# Patient Record
Sex: MALE | Race: WHITE | NOT HISPANIC OR LATINO | Employment: FULL TIME | URBAN - METROPOLITAN AREA
[De-identification: names, ages, dates, MRNs, and addresses within clinical notes are randomized per-mention and may not be internally consistent; named-entity substitution may affect disease eponyms.]

---

## 2017-03-23 DIAGNOSIS — Z76.0 ENCOUNTER FOR MEDICATION REFILL: ICD-10-CM

## 2017-03-23 RX ORDER — ATORVASTATIN CALCIUM 10 MG/1
TABLET, FILM COATED ORAL
Qty: 90 TABLET | Refills: 1 | Status: SHIPPED | OUTPATIENT
Start: 2017-03-23 | End: 2017-04-21

## 2017-03-23 NOTE — TELEPHONE ENCOUNTER
atrovasting last OV 9/23/16 last labs 2/15/16- has 4/21/17 cpx  Dot Chavez MA March 23, 2017 10:38 AM    Recent Labs   Lab Test  02/15/16   1557  12/19/14   0835   CHOL  218*  228*   HDL  38*  36*   LDL  139*  154*   TRIG  203*  190*

## 2017-03-27 DIAGNOSIS — Z76.0 ENCOUNTER FOR MEDICATION REFILL: ICD-10-CM

## 2017-04-21 ENCOUNTER — OFFICE VISIT (OUTPATIENT)
Dept: FAMILY MEDICINE | Facility: CLINIC | Age: 47
End: 2017-04-21

## 2017-04-21 VITALS
WEIGHT: 228 LBS | SYSTOLIC BLOOD PRESSURE: 110 MMHG | RESPIRATION RATE: 16 BRPM | HEART RATE: 67 BPM | OXYGEN SATURATION: 98 % | HEIGHT: 69 IN | DIASTOLIC BLOOD PRESSURE: 78 MMHG | BODY MASS INDEX: 33.77 KG/M2

## 2017-04-21 DIAGNOSIS — Z00.00 ROUTINE GENERAL MEDICAL EXAMINATION AT A HEALTH CARE FACILITY: ICD-10-CM

## 2017-04-21 DIAGNOSIS — K21.00 GASTROESOPHAGEAL REFLUX DISEASE WITH ESOPHAGITIS: Primary | ICD-10-CM

## 2017-04-21 DIAGNOSIS — J45.20 INTERMITTENT ASTHMA, UNCOMPLICATED: ICD-10-CM

## 2017-04-21 DIAGNOSIS — E66.09 NON MORBID OBESITY DUE TO EXCESS CALORIES: ICD-10-CM

## 2017-04-21 DIAGNOSIS — J30.1 SEASONAL ALLERGIC RHINITIS DUE TO POLLEN: ICD-10-CM

## 2017-04-21 DIAGNOSIS — G47.33 OSA (OBSTRUCTIVE SLEEP APNEA): ICD-10-CM

## 2017-04-21 DIAGNOSIS — E78.00 HYPERCHOLESTEREMIA: ICD-10-CM

## 2017-04-21 DIAGNOSIS — Z12.5 SCREENING FOR PROSTATE CANCER: ICD-10-CM

## 2017-04-21 LAB
% GRANULOCYTES: 76.4 % (ref 42.2–75.2)
HCT VFR BLD AUTO: 44.3 % (ref 39–51)
HEMOGLOBIN: 15 G/DL (ref 13.4–17.5)
LYMPHOCYTES NFR BLD AUTO: 17.7 % (ref 20.5–51.1)
MCH RBC QN AUTO: 29.7 PG (ref 27–31)
MCHC RBC AUTO-ENTMCNC: 33.8 G/DL (ref 33–37)
MCV RBC AUTO: 87.8 FL (ref 80–100)
MONOCYTES NFR BLD AUTO: 5.9 % (ref 1.7–9.3)
PLATELET # BLD AUTO: 214 K/UL (ref 140–450)
RBC # BLD AUTO: 5.05 X10/CMM (ref 4.2–5.9)
WBC # BLD AUTO: 6.9 X10/CMM (ref 3.8–11)

## 2017-04-21 PROCEDURE — 80053 COMPREHEN METABOLIC PANEL: CPT | Mod: 90 | Performed by: FAMILY MEDICINE

## 2017-04-21 PROCEDURE — 99213 OFFICE O/P EST LOW 20 MIN: CPT | Mod: 25 | Performed by: FAMILY MEDICINE

## 2017-04-21 PROCEDURE — 84153 ASSAY OF PSA TOTAL: CPT | Mod: 90 | Performed by: FAMILY MEDICINE

## 2017-04-21 PROCEDURE — 80061 LIPID PANEL: CPT | Mod: 90 | Performed by: FAMILY MEDICINE

## 2017-04-21 PROCEDURE — 85025 COMPLETE CBC W/AUTO DIFF WBC: CPT | Performed by: FAMILY MEDICINE

## 2017-04-21 PROCEDURE — 36415 COLL VENOUS BLD VENIPUNCTURE: CPT | Performed by: FAMILY MEDICINE

## 2017-04-21 PROCEDURE — 99396 PREV VISIT EST AGE 40-64: CPT | Performed by: FAMILY MEDICINE

## 2017-04-21 RX ORDER — FLUTICASONE PROPIONATE 50 MCG
SPRAY, SUSPENSION (ML) NASAL
Qty: 16 G | Refills: 3 | Status: SHIPPED | OUTPATIENT
Start: 2017-04-21 | End: 2017-08-26

## 2017-04-21 NOTE — PROGRESS NOTES
Problem(s) Oriented visit    Besides the Wellness Exam he is here to discuss the status of the following problem(s)  Subjective:  1. Gastroesophageal reflux disease with esophagitis  GERD stable.  Taking meds as ordered, no reported side effects from medicines.  Eating properly to avoid sx.   No melena, no hematochezia, no diarrhea.  No unintended weigth loss.    - CBC with Diff/Plt ([facility])    2. Intermittent asthma, uncomplicated  Asthma stable.  Has not had any recent breathing troubles beyond usual baseline.  Has not any acute respiratory events.  Remains with intermittent cough, mild shortness of breath with overexertion as per usual.  Using medication as directed with reported side effects    ACT Total Scores 1/22/2015 2/15/2016 4/21/2017   ACT TOTAL SCORE 23 - -   ASTHMA ER VISITS 0 = None - -   ASTHMA HOSPITALIZATIONS 0 = None - -   ACT TOTAL SCORE (Goal Greater than or Equal to 20) - 25 25   In the past 12 months, how many times did you visit the emergency room for your asthma without being admitted to the hospital? - 0 0   In the past 12 months, how many times were you hospitalized overnight because of your asthma? - 0 0         3. Non morbid obesity due to excess calories    4. URBANO (obstructive sleep apnea)  Has known obstructive sleep apnea.  Has been prescribed CPAP, uses it almost every night.  They feel it helps, and generally awakens feeling relatively refreshed, no daytime somnolence.      5.Hypercholesteremia  Has history of hyperlipidemia.  On statin for this, denies any significant side effects of this medication.      Latest labs reviewed:    Recent Labs   Lab Test  02/15/16   1557  12/19/14   0835   CHOL  218*  228*   HDL  38*  36*   LDL  139*  154*   TRIG  203*  190*        Lab Results   Component Value Date    AST 18 02/15/2016        6. Seasonal allergic rhinitis due to pollen  Pt has positive allergy symptoms such as runny nose, congestion, watery eyes, and slight cough at certain times  during the year. Is tolerating the current mixture during the times of the year when his allergies are active with the listed medications.           ROS:  General and CV completed and negative except as noted above     HISTORY:   reports that he has never smoked. He has never used smokeless tobacco.    EXAM:  BP: 110/78   Pulse: 67    Temp: Data Unavailable    Wt Readings from Last 2 Encounters:   04/21/17 103.4 kg (228 lb)   09/23/16 101.6 kg (224 lb)       BMI= Body mass index is 33.92 kg/(m^2).    EXAM:  APPEARANCE: = Relaxed and in no distress      Assessment/Plan:  Dion was seen today for physical.    Diagnoses and all orders for this visit:    Gastroesophageal reflux disease with esophagitis  -     CBC with Diff/Plt ([facility])  Discussed the functional nature of this condition regarding what they eat, how they eat, when they eat, and how much they eat as all contributing to gastroesophageal symptoms.    Recommend anti-reflux diet and eating patterns emphasizing smaller more frequent meals and timing relative to bedtime.  Also recommend avoiding tomatoes, onions, spicy foods, caffeine, or any other food/beverage that cause increased reflux.    If symptoms not controlled on current therapies, then need to return for further evaluation and consideration of further studies.    Intermittent asthma, uncomplicated  Discussed asthma in detail and discussed how their breathing symptoms and the pattern of the shortness of breath fits with asthma.   Discussed pathophysiology of asthma and treatments based on the degree of symptoms.   Discussed triggers for asthma in general, and those specific to the patient.  Also told them to anticipate potentially more intense coughing and shortness of breath with any URI (regardless of bacterial or viral etiology) and to be prepared to use the albuterol more often if needed and to return and see me for any such exacerbation.    I recommended having rescue inhaler available and using all  throughout the year as needed, demonstrated proper MDI technique for them.  Emphasized the need for them to let me know if there are any changes for the worse in their breathing related to asthma, either acute or chronic and to seek emergency care if the breathing acutely worsens in a sever manner.      Non morbid obesity due to excess calories  The patient's current BMI is: Body mass index is 33.92 kg/(m^2).  Obesity is a biological preventable and treatable disease, which is associated with significantly increased risk of many acute and chronic health conditions. Obesity has now been recognized as a chronic disease by the American Medical Association.  A range of serious co-morbidities are associated with obesity including increased risk for hypertension, stroke, coronary artery disease, dyslipidemia, Type II diabetes, depression, sleep apnea, cancers of the colon, breast and endometrium, obstructive sleep apnea, osteoarthritis and female infertility. Therefore, obesity is not just a problem; it s a disease that warrants serious evidence based treatements.  Recommended regular aerobic exercise, recommended improved diet aiming at lowering amount of processed foods, lower sugars, and lower wheat products, and moderation carbs and fat in the diet, establishing more regular meal times, always eating breakfast, front loading some of the calories and adding more protein to the diet.    Discussed the increased risks of developing or worsening all types of diabetes and other dysmetabolic syndromes.    Surgical therapy may be considered, especially in patients with BMI greater than or equal to 35 kg/m2 with multiple complications. Surgical treatments include lap-band, gastric sleeve or gastric bypass surgery.      URBANO (obstructive sleep apnea)  The patients symptoms as listed above sound suspicious for sleep apnea.  Discussed sleep apnea syndrome in detail including symptoms, workup, diagnostic process, treatment  considerations including sleep studies, CPAP, and ENT surgery if abnormal upper airway physical abnormalities.  Will refer to pulmonary for initiation of diagnostic procedures to include sleep study if applicable.    Seasonal allergic rhinitis due to pollen  -     fluticasone (FLONASE) 50 MCG/ACT spray; Use two sprays in each nostril daily  Reviewed seasonal allergies/ environmental allergies/allergic and/or chronic rhintis with the pt. and available treatment options.  Pt understands that the insurance companies have lately desiring to see trial and failure of claritin OTC before covering prescription.  Also disucssed the role of steroid nasal sprays in these types of situations. Discussed the concept of avoidance measures and taking an active role in modifying whatever can be changed in preventing exposures to knwon allergens.  Also discussed the imprtance of reconsdiering ets if they are part of the problem. Also discussed the absolute need for smoking cessation if any tobacco abuse present.    Hypercholesteremia  -     Lipid Panel ([facility])    Discussed current lipid results, previous results (if available) current guidelines (NCEP) for treatment and goals for lipids.  Discussed lifestyle modification, dietary changes (low fat, low simple carb) and regular aerobic exercise.  Discussed the link between dysmetabolic syndrome and impaired glucose tolerance seen in certain patterns of lipids.  Briefly discussed medication used for lipid lowering, including the statins are their possible side effects of myalgias, rhabdomyolysis, and liver toxicity.      Reviewed patients plan of care and provided an AVS.   Viral Rivera  [facility]  [phone]   For any issues my office # is [phone]

## 2017-04-21 NOTE — LETTER
My Asthma Action Plan  Name: Dion Thomson   YOB: 1970  Date: 4/21/2017   My doctor: Viral Rivera MD   My clinic: [facility]        My Control Medicine: none  My Rescue Medicine: Albuterol (Proair/Ventolin/Proventil) inhaler prn   My Asthma Severity: intermittent  Avoid your asthma triggers: upper respiratory infections, dust mites, animal dander, humidity, exercise or sports and cold air               GREEN ZONE     Good Control    I feel good    No cough or wheeze    Can work, sleep and play without asthma symptoms       Take your asthma control medicine every day.     1. If exercise triggers your asthma, take your rescue medication    15 minutes before exercise or sports, and    During exercise if you have asthma symptoms  2. Spacer to use with inhaler: If you have a spacer, make sure to use it with your inhaler             YELLOW ZONE     Getting Worse  I have ANY of these:    I do not feel good    Cough or wheeze    Chest feels tight    Wake up at night   1. Keep taking your Green Zone medications  2. Start taking your rescue medicine:    every 20 minutes for up to 1 hour. Then every 4 hours for 24-48 hours.  3. If you stay in the Yellow Zone for more than 12-24 hours, contact your doctor.  4. If you do not return to the Green Zone in 12-24 hours or you get worse, start taking your oral steroid medicine if prescribed by your provider.           RED ZONE     Medical Alert - Get Help  I have ANY of these:    I feel awful    Medicine is not helping    Breathing getting harder    Trouble walking or talking    Nose opens wide to breathe       1. Take your rescue medicine NOW  2. If your provider has prescribed an oral steroid medicine, start taking it NOW  3. Call your doctor NOW  4. If you are still in the Red Zone after 20 minutes and you have not reached your doctor:    Take your rescue medicine again and    Call 911 or go to the emergency room right away    See your regular  doctor within 2 weeks of an Emergency Room or Urgent Care visit for follow-up treatment.        Electronically signed by: Dot Chavez, April 21, 2017    Annual Reminders:  Meet with Asthma Educator,  Flu Shot in the Fall, consider Pneumonia Vaccination for patients with asthma (aged 19 and older).    Pharmacy: [facility] [id number] [place] [address]                    Asthma Triggers  How To Control Things That Make Your Asthma Worse    Triggers are things that make your asthma worse.  Look at the list below to help you find your triggers and what you can do about them.  You can help prevent asthma flare-ups by staying away from your triggers.      Trigger                                                          What you can do   Cigarette Smoke  Tobacco smoke can make asthma worse. Do not allow smoking in your home, car or around you.  Be sure no one smokes at a child s day care or school.  If you smoke, ask your health care provider for ways to help you quit.  Ask family members to quit too.  Ask your health care provider for a referral to Quit Plan to help you quit smoking, or call 6-901-624-PLAN.     Colds, Flu, Bronchitis  These are common triggers of asthma. Wash your hands often.  Don t touch your eyes, nose or mouth.  Get a flu shot every year.     Dust Mites  These are tiny bugs that live in cloth or carpet. They are too small to see. Wash sheets and blankets in hot water every week.   Encase pillows and mattress in dust mite proof covers.  Avoid having carpet if you can. If you have carpet, vacuum weekly.   Use a dust mask and HEPA vacuum.   Pollen and Outdoor Mold  Some people are allergic to trees, grass, or weed pollen, or molds. Try to keep your windows closed.  Limit time out doors when pollen count is high.   Ask you health care provider about taking medicine during allergy season.     Animal Dander  Some people are allergic to skin flakes, urine or saliva from pets with fur or  feathers. Keep pets with fur or feathers out of your home.    If you can t keep the pet outdoors, then keep the pet out of your bedroom.  Keep the bedroom door closed.  Keep pets off cloth furniture and away from stuffed toys.     Mice, Rats, and Cockroaches  Some people are allergic to the waste from these pests.   Cover food and garbage.  Clean up spills and food crumbs.  Store grease in the refrigerator.   Keep food out of the bedroom.   Indoor Mold  This can be a trigger if your home has high moisture. Fix leaking faucets, pipes, or other sources of water.   Clean moldy surfaces.  Dehumidify basement if it is damp and smelly.   Smoke, Strong Odors, and Sprays  These can reduce air quality. Stay away from strong odors and sprays, such as perfume, powder, hair spray, paints, smoke incense, paint, cleaning products, candles and new carpet.   Exercise or Sports  Some people with asthma have this trigger. Be active!  Ask your doctor about taking medicine before sports or exercise to prevent symptoms.    Warm up for 5-10 minutes before and after sports or exercise.     Other Triggers of Asthma  Cold air:  Cover your nose and mouth with a scarf.  Sometimes laughing or crying can be a trigger.  Some medicines and food can trigger asthma.

## 2017-04-21 NOTE — MR AVS SNAPSHOT
After Visit Summary   4/21/2017    Dion Thomson    MRN: [id number]           Patient Information     Date Of Birth          1970        Visit Information        Provider Department      4/21/2017 8:00 AM Viral Rivera MD [facility]        Today's Diagnoses     Gastroesophageal reflux disease with esophagitis    -  1    Intermittent asthma, uncomplicated        Non morbid obesity due to excess calories        URBANO (obstructive sleep apnea)        Routine general medical examination at a health care facility        Screening for prostate cancer        Seasonal allergic rhinitis due to pollen        Hypercholesteremia          Care Instructions      Preventive Health Recommendations  Male Ages 40 to 49    Yearly exam:             See your health care provider every year in order to  o   Review health changes.   o   Discuss preventive care.    o   Review your medicines if your doctor has prescribed any.    You should be tested each year for STDs (sexually transmitted diseases) if you re at risk.     Have a cholesterol test every 5 years.     Have a colonoscopy (test for colon cancer) if someone in your family has had colon cancer or polyps before age 50.     After age 45, have a diabetes test (fasting glucose). If you are at risk for diabetes, you should have this test every 3 years.      Talk with your health care provider about whether or not a prostate cancer screening test (PSA) is right for you.    Shots: Get a flu shot each year. Get a tetanus shot every 10 years.     Nutrition:    Eat at least 5 servings of fruits and vegetables daily.     Eat whole-grain bread, whole-wheat pasta and brown rice instead of white grains and rice.     Talk to your provider about Calcium and Vitamin D.     Lifestyle    Exercise for at least 150 minutes a week (30 minutes a day, 5 days a week). This will help you control your weight and prevent disease.     Limit alcohol to one drink per day.  "    No smoking.     Wear sunscreen to prevent skin cancer.     See your dentist every six months for an exam and cleaning.            Follow-ups after your visit        Who to contact     If you have questions or need follow up information about today's clinic visit or your schedule please contact [facility] directly at [phone].  Normal or non-critical lab and imaging results will be communicated to you by MyChart, letter or phone within 4 business days after the clinic has received the results. If you do not hear from us within 7 days, please contact the clinic through in3Deptht or phone. If you have a critical or abnormal lab result, we will notify you by phone as soon as possible.  Submit refill requests through [facility] or call your pharmacy and they will forward the refill request to us. Please allow 3 business days for your refill to be completed.          Additional Information About Your Visit        MyChart Information     [facility] gives you secure access to your electronic health record. If you see a primary care provider, you can also send messages to your care team and make appointments. If you have questions, please call your primary care clinic.  If you do not have a primary care provider, please call [phone] and they will assist you.        Care EveryWhere ID     This is your Care EveryWhere ID. This could be used by other organizations to access your [facility] medical records  [id number]        Your Vitals Were     Pulse Respirations Height Pulse Oximetry BMI (Body Mass Index)       67 16 1.746 m (5' 8.75\") 98% 33.92 kg/m2        Blood Pressure from Last 3 Encounters:   04/21/17 110/78   09/23/16 110/70   02/15/16 118/80    Weight from Last 3 Encounters:   04/21/17 103.4 kg (228 lb)   09/23/16 101.6 kg (224 lb)   02/15/16 102.9 kg (226 lb 12.8 oz)              We Performed the Following     CBC with Diff/Plt ([facility])     Comp. Metabolic Panel (14) ([facility])     Lipid Panel ([facility])  "    PSA Serum ([facility])          Today's Medication Changes          These changes are accurate as of: 4/21/17  8:47 AM.  If you have any questions, ask your nurse or doctor.               These medicines have changed or have updated prescriptions.        Dose/Directions    fluticasone 50 MCG/ACT spray   Commonly known as:  FLONASE   This may have changed:  See the new instructions.   Used for:  Seasonal allergic rhinitis due to pollen   Changed by:  Viral Rivera MD        Use two sprays in each nostril daily   Quantity:  16 g   Refills:  3            Where to get your medicines      These medications were sent to [place] [address] [place] [address] [address]     Phone:  [phone]     fluticasone 50 MCG/ACT spray                Primary Care Provider Office Phone # Fax #    Viral Rivera [phone]       [facility] [address] [address]        Thank you!     Thank you for choosing [facility]  for your care. Our goal is always to provide you with excellent care. Hearing back from our patients is one way we can continue to improve our services. Please take a few minutes to complete the written survey that you may receive in the mail after your visit with us. Thank you!             Your Updated Medication List - Protect others around you: Learn how to safely use, store and throw away your medicines at www.disposemymeds.org.          This list is accurate as of: 4/21/17  8:47 AM.  Always use your most recent med list.                   Brand Name Dispense Instructions for use    albuterol 108 (90 BASE) MCG/ACT Inhaler    PROAIR HFA/PROVENTIL HFA/VENTOLIN HFA    1 Inhaler    Inhale 2 puffs into the lungs every 6 hours as needed for shortness of breath / dyspnea       atorvastatin 10 MG tablet    LIPITOR    90 tablet    TAKE ONE TABLET BY MOUTH ONE TIME DAILY       fluticasone 50 MCG/ACT  spray    FLONASE    16 g    Use two sprays in each nostril daily       omeprazole 20 MG CR capsule    priLOSEC    60 capsule    TAKE TWO CAPSULES BY MOUTH DAILY       vitamin B complex with vitamin C Tabs tablet      Take 1 tablet by mouth daily       vitamin D 2000 UNITS tablet     100 tablet    Take 2,000 Units by mouth daily

## 2017-04-21 NOTE — PROGRESS NOTES
SUBJECTIVE:     CC: Dion Thomson is an 46 year old male who presents for preventative health visit.     Healthy Habits:    Do you get at least three servings of calcium containing foods daily (dairy, green leafy vegetables, etc.)? yes and vitamin d    Amount of exercise or daily activities, outside of work: 1-2 day(s) per week - yard work, walks dog     Problems taking medications regularly No    Medication side effects: No    Have you had an eye exam in the past two years? yes    Do you see a dentist twice per year? yes    Do you have sleep apnea, excessive snoring or daytime drowsiness?sleep apnea- on cpap        Fasting today, refill med    Today's PHQ-2 Score:   PHQ-2 ( 1999 Pfizer) 2/15/2016 12/19/2014   Q1: Little interest or pleasure in doing things 0 0   Q2: Feeling down, depressed or hopeless - 0   PHQ-2 Score - 0       Abuse: Current or Past(Physical, Sexual or Emotional)- No  Do you feel safe in your environment - Yes    Social History   Substance Use Topics     Smoking status: Never Smoker     Smokeless tobacco: Never Used     Alcohol use 0.0 - 0.5 oz/week     0 - 1 drink(s) per week      Comment: little     The patient does not drink >3 drinks per day nor >7 drinks per week.    Last PSA: No results found for: PSA    Recent Labs   Lab Test  02/15/16   1557  12/19/14   0835   CHOL  218*  228*   HDL  38*  36*   LDL  139*  154*   TRIG  203*  190*       Reviewed orders with patient. Reviewed health maintenance and updated orders accordingly - Yes    Reviewed and updated as needed this visit by clinical staff  Tobacco  Allergies  Meds         Reviewed and updated as needed this visit by Provider        Past Medical History:   Diagnosis Date     Hypercholesteremia      Intermittent asthma 12/20/2013     URBANO (obstructive sleep apnea) 12/19/2014      Past Surgical History:   Procedure Laterality Date     ARTHROSCOPIC RECONSTRUCTION ANTERIOR CRUCIATE LIGAMENT  1997    Open, Knee Left     ARTHROSCOPY KNEE  "RT/LT  4/05    Left     GENITOURINARY SURGERY      Vasectomy     TONSILLECTOMY & ADENOIDECTOMY  5 yo       ROS:  C: NEGATIVE for fever, chills, change in weight  I: NEGATIVE for worrisome rashes, moles or lesions  E: NEGATIVE for vision changes or irritation  ENT: NEGATIVE for ear, mouth and throat problems  R: NEGATIVE for significant cough or SOB  CV: NEGATIVE for chest pain, palpitations or peripheral edema  GI: NEGATIVE for nausea, abdominal pain, heartburn, or change in bowel habits   male: negative for dysuria, hematuria, decreased urinary stream, erectile dysfunction, urethral discharge  M: NEGATIVE for significant arthralgias or myalgia  N: NEGATIVE for weakness, dizziness or paresthesias  P: NEGATIVE for changes in mood or affect    BP Readings from Last 3 Encounters:   04/21/17 110/78   09/23/16 110/70   02/15/16 118/80    Wt Readings from Last 3 Encounters:   04/21/17 103.4 kg (228 lb)   09/23/16 101.6 kg (224 lb)   02/15/16 102.9 kg (226 lb 12.8 oz)                  OBJECTIVE:     Ht 1.746 m (5' 8.75\")  Wt 103.4 kg (228 lb)  BMI 33.92 kg/m2  EXAM:  GENERAL: healthy, alert and no distress  EYES: Eyes grossly normal to inspection, PERRL and conjunctivae and sclerae normal  HENT: ear canals and TM's normal, nose and mouth without ulcers or lesions  NECK: no adenopathy, no asymmetry, masses, or scars and thyroid normal to palpation  RESP: lungs clear to auscultation - no rales, rhonchi or wheezes  CV: regular rate and rhythm, normal S1 S2, no S3 or S4, no murmur, click or rub, no peripheral edema and peripheral pulses strong  ABDOMEN: soft, nontender, no hepatosplenomegaly, no masses and bowel sounds normal  MS: no gross musculoskeletal defects noted, no edema  SKIN: no suspicious lesions or rashes  NEURO: Normal strength and tone, mentation intact and speech normal  PSYCH: mentation appears normal, affect normal/bright    ASSESSMENT/PLAN:     Dion was seen today for physical.    Diagnoses and all " "orders for this visit:      COUNSELING:  Reviewed preventive health counseling, as reflected in patient instructions       Healthy diet/nutrition    BP Screening:   Last 3 BP Readings:    BP Readings from Last 3 Encounters:   04/21/17 110/78   09/23/16 110/70   02/15/16 118/80       The following was recommended to the patient:  Re-screen BP within a year and recommended lifestyle modifications     reports that he has never smoked. He has never used smokeless tobacco.    Estimated body mass index is 33.92 kg/(m^2) as calculated from the following:    Height as of this encounter: 1.746 m (5' 8.75\").    Weight as of this encounter: 103.4 kg (228 lb).   Weight management plan: Specific weight management program called wheat belly discussed and follow up in 6 months in clinic to re-evaluate.    Counseling Resources:  ATP IV Guidelines  Pooled Cohorts Equation Calculator  FRAX Risk Assessment  ICSI Preventive Guidelines  Dietary Guidelines for Americans, 2010  USDA's MyPlate  ASA Prophylaxis  Lung CA Screening    Viral Rivera MD  [facility]  "

## 2017-04-22 LAB
ALBUMIN SERPL-MCNC: 4.2 G/DL (ref 3.5–5.5)
ALBUMIN/GLOB SERPL: 1.6 {RATIO} (ref 1.2–2.2)
ALP SERPL-CCNC: 49 IU/L (ref 39–117)
ALT SERPL-CCNC: 22 IU/L (ref 0–44)
AST SERPL-CCNC: 18 IU/L (ref 0–40)
BILIRUB SERPL-MCNC: 0.6 MG/DL (ref 0–1.2)
BUN SERPL-MCNC: 12 MG/DL (ref 6–24)
BUN/CREATININE RATIO: 14 (ref 9–20)
CALCIUM SERPL-MCNC: 9.4 MG/DL (ref 8.7–10.2)
CHLORIDE SERPLBLD-SCNC: 102 MMOL/L (ref 96–106)
CHOLEST SERPL-MCNC: 152 MG/DL (ref 100–199)
CREAT SERPL-MCNC: 0.84 MG/DL (ref 0.76–1.27)
EGFR IF AFRICN AM: 123 ML/MIN/1.73
EGFR IF NONAFRICN AM: 106 ML/MIN/1.73
GLOBULIN, TOTAL: 2.7 G/DL (ref 1.5–4.5)
GLUCOSE SERPL-MCNC: 98 MG/DL (ref 65–99)
HDLC SERPL-MCNC: 34 MG/DL
LDL/HDL RATIO: 2.4 RATIO UNITS (ref 0–3.6)
LDLC SERPL CALC-MCNC: 83 MG/DL (ref 0–99)
POTASSIUM SERPL-SCNC: 4.7 MMOL/L (ref 3.5–5.2)
PROT SERPL-MCNC: 6.9 G/DL (ref 6–8.5)
PSA NG/ML: 1 NG/ML (ref 0–4)
SODIUM SERPL-SCNC: 142 MMOL/L (ref 134–144)
TOTAL CO2: 24 MMOL/L (ref 18–28)
TRIGL SERPL-MCNC: 173 MG/DL (ref 0–149)
VLDLC SERPL CALC-MCNC: 35 MG/DL (ref 5–40)

## 2017-04-22 ASSESSMENT — ASTHMA QUESTIONNAIRES: ACT_TOTALSCORE: 25

## 2017-04-24 NOTE — PROGRESS NOTES
Dear Dion,   I am writing to report that your included test results are within expected ranges. I do not suggest that we make any changes at this time.    Viral Rivera M.D.

## 2017-07-17 ENCOUNTER — TRANSFERRED RECORDS (OUTPATIENT)
Dept: FAMILY MEDICINE | Facility: CLINIC | Age: 47
End: 2017-07-17

## 2017-08-11 DIAGNOSIS — Z76.0 ENCOUNTER FOR MEDICATION REFILL: ICD-10-CM

## 2017-08-11 NOTE — TELEPHONE ENCOUNTER
Pending Prescriptions:                       Disp   Refills    omeprazole (PRILOSEC) 20 MG CR capsule [Ph*60 cap*2        Sig: TAKE TWO CAPSULES BY MOUTH DAILY    Last OV 4/21/17  Patient is due for CPX  CBC, Lipid, CMP 4/21/17

## 2018-01-31 DIAGNOSIS — Z79.899 ENCOUNTER FOR LONG-TERM (CURRENT) USE OF MEDICATIONS: Primary | ICD-10-CM

## 2018-01-31 NOTE — TELEPHONE ENCOUNTER
omeprazole (PRILOSEC) 20 MG CR capsule   LAST  Med check 4/21/17   last labs(for RX) 4/21/17  Next  appt scheduled =  none  Dot Chavez MA

## 2018-03-28 DIAGNOSIS — E78.00 HYPERCHOLESTEREMIA: Primary | ICD-10-CM

## 2018-03-28 DIAGNOSIS — Z79.899 ENCOUNTER FOR LONG-TERM (CURRENT) USE OF MEDICATIONS: ICD-10-CM

## 2018-03-28 NOTE — TELEPHONE ENCOUNTER
atorvastatin (LIPITOR) 10 MG   LAST  Med check 4/21/17   last labs(for RX) 4/21/17  Next  appt scheduled =  None - due  Dot Chavez MA    Recent Labs   Lab Test  04/21/17   0815  02/15/16   1557   CHOL  152  218*   HDL  34*  38*   LDL  83  139*   TRIG  173*  203*

## 2018-03-29 RX ORDER — ATORVASTATIN CALCIUM 10 MG/1
TABLET, FILM COATED ORAL
Qty: 90 TABLET | Refills: 0 | Status: SHIPPED | OUTPATIENT
Start: 2018-03-29 | End: 2018-07-02

## 2018-03-30 ENCOUNTER — TELEPHONE (OUTPATIENT)
Dept: FAMILY MEDICINE | Facility: CLINIC | Age: 48
End: 2018-03-30

## 2018-04-02 DIAGNOSIS — Z76.0 ENCOUNTER FOR MEDICATION REFILL: ICD-10-CM

## 2018-04-02 RX ORDER — ATORVASTATIN CALCIUM 10 MG/1
TABLET, FILM COATED ORAL
Qty: 90 TABLET | Refills: 1 | Status: SHIPPED | OUTPATIENT
Start: 2018-04-02 | End: 2018-09-21

## 2018-06-20 DIAGNOSIS — Z79.899 ENCOUNTER FOR LONG-TERM (CURRENT) USE OF MEDICATIONS: ICD-10-CM

## 2018-07-02 DIAGNOSIS — Z79.899 ENCOUNTER FOR LONG-TERM (CURRENT) USE OF MEDICATIONS: ICD-10-CM

## 2018-07-02 DIAGNOSIS — E78.00 HYPERCHOLESTEREMIA: ICD-10-CM

## 2018-07-02 RX ORDER — ATORVASTATIN CALCIUM 10 MG/1
TABLET, FILM COATED ORAL
Qty: 90 TABLET | Refills: 0 | Status: SHIPPED | OUTPATIENT
Start: 2018-07-02 | End: 2018-09-21

## 2018-07-02 NOTE — TELEPHONE ENCOUNTER
Last OV 4/21/17  Last Labs 4/21/17    Cholesterol   Date Value Ref Range Status   04/21/2017 152 100 - 199 mg/dL Final   02/15/2016 218 (H) 100 - 199 mg/dL Final     HDL Cholesterol   Date Value Ref Range Status   04/21/2017 34 (L) >39 mg/dL Final   02/15/2016 38 (L) >39 mg/dL Final     Comment:     According to ATP-III Guidelines, HDL-C >59 mg/dL is considered a  negative risk factor for CHD.       LDL Cholesterol Calculated   Date Value Ref Range Status   04/21/2017 83 0 - 99 mg/dL Final   02/15/2016 139 (H) 0 - 99 mg/dL Final     Triglycerides   Date Value Ref Range Status   04/21/2017 173 (H) 0 - 149 mg/dL Final   02/15/2016 203 (H) 0 - 149 mg/dL Final     No results found for: CHOLHDLRATIO

## 2018-08-20 ENCOUNTER — TELEPHONE (OUTPATIENT)
Dept: FAMILY MEDICINE | Facility: CLINIC | Age: 48
End: 2018-08-20

## 2018-08-20 DIAGNOSIS — K21.9 ESOPHAGEAL REFLUX: Primary | ICD-10-CM

## 2018-08-20 RX ORDER — OMEPRAZOLE 40 MG/1
40 CAPSULE, DELAYED RELEASE ORAL DAILY
Qty: 30 CAPSULE | Refills: 2 | Status: SHIPPED | OUTPATIENT
Start: 2018-08-20 | End: 2018-09-21 | Stop reason: DRUGHIGH

## 2018-08-20 NOTE — TELEPHONE ENCOUNTER
Received fax from [facility] pharmacy that patient's rx for Omeprazole 20 mg -take 2 by mouth daily is not covered under his insurance plan. They will cover 40 mg once a day.  Sent in rx for 40 mg once a day to [facility] pharmacy.

## 2018-09-21 ENCOUNTER — OFFICE VISIT (OUTPATIENT)
Dept: FAMILY MEDICINE | Facility: CLINIC | Age: 48
End: 2018-09-21

## 2018-09-21 VITALS
BODY MASS INDEX: 32.56 KG/M2 | DIASTOLIC BLOOD PRESSURE: 74 MMHG | SYSTOLIC BLOOD PRESSURE: 116 MMHG | WEIGHT: 219.8 LBS | RESPIRATION RATE: 12 BRPM | OXYGEN SATURATION: 96 % | HEIGHT: 69 IN | HEART RATE: 68 BPM | TEMPERATURE: 98.2 F

## 2018-09-21 DIAGNOSIS — J45.20 INTERMITTENT ASTHMA, UNCOMPLICATED: ICD-10-CM

## 2018-09-21 DIAGNOSIS — Z12.5 SCREENING FOR PROSTATE CANCER: Primary | ICD-10-CM

## 2018-09-21 DIAGNOSIS — E78.00 HYPERCHOLESTEREMIA: ICD-10-CM

## 2018-09-21 DIAGNOSIS — K21.9 GASTROESOPHAGEAL REFLUX DISEASE WITHOUT ESOPHAGITIS: ICD-10-CM

## 2018-09-21 DIAGNOSIS — G47.33 OSA (OBSTRUCTIVE SLEEP APNEA): ICD-10-CM

## 2018-09-21 DIAGNOSIS — Z00.00 ROUTINE GENERAL MEDICAL EXAMINATION AT A HEALTH CARE FACILITY: ICD-10-CM

## 2018-09-21 LAB
% GRANULOCYTES: 79.6 % (ref 42.2–75.2)
HCT VFR BLD AUTO: 44.4 % (ref 39–51)
HEMOGLOBIN: 14.8 G/DL (ref 13.4–17.5)
LYMPHOCYTES NFR BLD AUTO: 15.1 % (ref 20.5–51.1)
MCH RBC QN AUTO: 28.9 PG (ref 27–31)
MCHC RBC AUTO-ENTMCNC: 33.4 G/DL (ref 33–37)
MCV RBC AUTO: 86.4 FL (ref 80–100)
MONOCYTES NFR BLD AUTO: 5.3 % (ref 1.7–9.3)
PLATELET # BLD AUTO: 231 K/UL (ref 140–450)
RBC # BLD AUTO: 5.13 X10/CMM (ref 4.2–5.9)
WBC # BLD AUTO: 6.8 X10/CMM (ref 3.8–11)

## 2018-09-21 PROCEDURE — 85025 COMPLETE CBC W/AUTO DIFF WBC: CPT | Performed by: FAMILY MEDICINE

## 2018-09-21 PROCEDURE — 80061 LIPID PANEL: CPT | Mod: 90 | Performed by: FAMILY MEDICINE

## 2018-09-21 PROCEDURE — 36415 COLL VENOUS BLD VENIPUNCTURE: CPT | Performed by: FAMILY MEDICINE

## 2018-09-21 PROCEDURE — 80053 COMPREHEN METABOLIC PANEL: CPT | Mod: 90 | Performed by: FAMILY MEDICINE

## 2018-09-21 PROCEDURE — 99396 PREV VISIT EST AGE 40-64: CPT | Performed by: FAMILY MEDICINE

## 2018-09-21 PROCEDURE — 84153 ASSAY OF PSA TOTAL: CPT | Mod: 90 | Performed by: FAMILY MEDICINE

## 2018-09-21 RX ORDER — ALBUTEROL SULFATE 90 UG/1
2 AEROSOL, METERED RESPIRATORY (INHALATION) EVERY 6 HOURS PRN
Qty: 1 INHALER | Refills: 4 | Status: SHIPPED | OUTPATIENT
Start: 2018-09-21 | End: 2020-10-06

## 2018-09-21 RX ORDER — ATORVASTATIN CALCIUM 10 MG/1
10 TABLET, FILM COATED ORAL DAILY
Qty: 90 TABLET | Refills: 3 | Status: SHIPPED | OUTPATIENT
Start: 2018-09-21 | End: 2018-10-22

## 2018-09-21 NOTE — PROGRESS NOTES
SUBJECTIVE:   CC: Dion Thomson is an 47 year old male who presents for preventative health visit.     Healthy Habits:    Do you get at least three servings of calcium containing foods daily (dairy, green leafy vegetables, etc.)? yes and vit d    Amount of exercise or daily activities, outside of work: 1-2 day(s) per week yard work, walking    Problems taking medications regularly No    Medication side effects: No    Have you had an eye exam in the past two years? yes    Do you see a dentist twice per year? yes    Do you have sleep apnea, excessive snoring or daytime drowsiness?sleep apnea       Asthma issues - feeling worse in past month- chest congestion     Today's PHQ-2 Score:   PHQ-2 ( 1999 Pfizer) 4/21/2017 2/15/2016   Q1: Little interest or pleasure in doing things 0 0   Q2: Feeling down, depressed or hopeless 0 -   PHQ-2 Score 0 -       Abuse: Current or Past(Physical, Sexual or Emotional)- No  Do you feel safe in your environment - Yes    Social History   Substance Use Topics     Smoking status: Never Smoker     Smokeless tobacco: Never Used     Alcohol use 0.0 - 0.5 oz/week     0 - 1 Standard drinks or equivalent per week      Comment: little      If you drink alcohol do you typically have >3 drinks per day or >7 drinks per week? No                        Last PSA:  PSA NG/ML 1.0  0.0 - 4.0 ng/mL Final 04/21/2017       Has history of hyperlipidemia.  On statin for this, denies any significant side effects of this medication.      Latest labs reviewed:    Recent Labs   Lab Test  04/21/17   0815  02/15/16   1557   CHOL  152  218*   HDL  34*  38*   LDL  83  139*   TRIG  173*  203*        Lab Results   Component Value Date    AST 18 04/21/2017      GERD stable.  Taking meds as ordered, no reported side effects from medicines.  Eating properly to avoid sx.   No melena, no hematochezia, no diarrhea.  No unintended weigth loss.    Pt has positive allergy symptoms such as runny nose, congestion, watery eyes, and  slight cough at certain times during the year. Is tolerating the current mixture during the times of the year when his allergies are active with the listed medications.    Reactive airaway disease uses an inhaler with colds etc.    Has known obstructive sleep apnea.  Has been prescribed CPAP, uses it almost every night.  They feel it helps, and generally awakens feeling relatively refreshed, no daytime somnolence.          Reviewed orders with patient. Reviewed health maintenance and updated orders accordingly - Yes  Patient Active Problem List   Diagnosis     Esophageal reflux     Intermittent asthma     Obesity     URBANO (obstructive sleep apnea)     Hypercholesteremia     Encounter for long-term (current) use of medications     Past Surgical History:   Procedure Laterality Date     ARTHROSCOPIC RECONSTRUCTION ANTERIOR CRUCIATE LIGAMENT  1997    Open, Knee Left     ARTHROSCOPY KNEE RT/LT  4/05    Left     GENITOURINARY SURGERY      Vasectomy     TONSILLECTOMY & ADENOIDECTOMY  5 yo       Social History   Substance Use Topics     Smoking status: Never Smoker     Smokeless tobacco: Never Used     Alcohol use 0.0 - 0.5 oz/week     0 - 1 Standard drinks or equivalent per week      Comment: little     Family History   Problem Relation Age of Onset     Coronary Artery Disease Father            Reviewed and updated as needed this visit by clinical staff  Tobacco  Allergies  Meds         Reviewed and updated as needed this visit by Provider        Past Medical History:   Diagnosis Date     Hypercholesteremia      Intermittent asthma 12/20/2013     URBANO (obstructive sleep apnea) 12/19/2014      Past Surgical History:   Procedure Laterality Date     ARTHROSCOPIC RECONSTRUCTION ANTERIOR CRUCIATE LIGAMENT  1997    Open, Knee Left     ARTHROSCOPY KNEE RT/LT  4/05    Left     GENITOURINARY SURGERY      Vasectomy     TONSILLECTOMY & ADENOIDECTOMY  5 yo       ROS:  CONSTITUTIONAL: NEGATIVE for fever, chills, change in  "weight  INTEGUMENTARY/SKIN: NEGATIVE for worrisome rashes, moles or lesions  EYES: NEGATIVE for vision changes or irritation  ENT: NEGATIVE for ear, mouth and throat problems  RESP: NEGATIVE for significant cough or SOB  CV: NEGATIVE for chest pain, palpitations or peripheral edema  GI: NEGATIVE for nausea, abdominal pain, heartburn, or change in bowel habits   male: negative for dysuria, hematuria, decreased urinary stream, erectile dysfunction, urethral discharge  MUSCULOSKELETAL: NEGATIVE for significant arthralgias or myalgia  NEURO: NEGATIVE for weakness, dizziness or paresthesias  PSYCHIATRIC: NEGATIVE for changes in mood or affect    OBJECTIVE:   /74  Pulse 68  Temp 98.2  F (36.8  C) (Oral)  Resp 12  Ht 1.753 m (5' 9\")  Wt 99.7 kg (219 lb 12.8 oz)  SpO2 96%  BMI 32.46 kg/m2  EXAM:  GENERAL: healthy, alert and no distress  EYES: Eyes grossly normal to inspection, PERRL and conjunctivae and sclerae normal  HENT: ear canals and TM's normal, nose and mouth without ulcers or lesions  NECK: no adenopathy, no asymmetry, masses, or scars and thyroid normal to palpation  RESP: lungs clear to auscultation - no rales, rhonchi or wheezes  CV: regular rate and rhythm, normal S1 S2, no S3 or S4, no murmur, click or rub, no peripheral edema and peripheral pulses strong  ABDOMEN: soft, nontender, no hepatosplenomegaly, no masses and bowel sounds normal   (male): normal male genitalia without lesions or urethral discharge, no hernia  RECTAL: normal sphincter tone, no rectal masses, prostate normal size, smooth, nontender without nodules or masses  MS: no gross musculoskeletal defects noted, no edema  SKIN: no suspicious lesions or rashes  NEURO: Normal strength and tone, mentation intact and speech normal  PSYCH: mentation appears normal, affect normal/bright        ASSESSMENT/PLAN:   Dion was seen today for physical.    Diagnoses and all orders for this visit:    Screening for prostate cancer  -     PSA " Serum ([facility])    Routine general medical examination at a health care facility    Intermittent asthma, uncomplicated  Discussed asthma in detail and discussed how their breathing symptoms and the pattern of the shortness of breath fits with asthma.   Discussed pathophysiology of asthma and treatments based on the degree of symptoms.   Discussed triggers for asthma in general, and those specific to the patient.  Also told them to anticipate potentially more intense coughing and shortness of breath with any URI (regardless of bacterial or viral etiology) and to be prepared to use the albuterol more often if needed and to return and see me for any such exacerbation.    I recommended having rescue inhaler available and using all throughout the year as needed, demonstrated proper MDI technique for them.  Emphasized the need for them to let me know if there are any changes for the worse in their breathing related to asthma, either acute or chronic and to seek emergency care if the breathing acutely worsens in a sever manner.      -     albuterol (PROAIR HFA/PROVENTIL HFA/VENTOLIN HFA) 108 (90 Base) MCG/ACT inhaler; Inhale 2 puffs into the lungs every 6 hours as needed for shortness of breath / dyspnea    Hypercholesteremia  Discussed current lipid results, previous results (if available) current guidelines (NCEP) for treatment and goals for lipids.  Discussed lifestyle modification, dietary changes (low fat, low simple carb) and regular aerobic exercise.  Discussed the link between dysmetabolic syndrome and impaired glucose tolerance seen in certain patterns of lipids.  Briefly discussed medication used for lipid lowering, including the statins are their possible side effects of myalgias, rhabdomyolysis, and liver toxicity.    -     atorvastatin (LIPITOR) 10 MG tablet; Take 1 tablet (10 mg) by mouth daily  -     Lipid Panel ([facility])  -     Comp. Metabolic Panel (14) ([facility])    URBANO (obstructive sleep apnea)  The  "patients symptoms as listed above sound suspicious for sleep apnea.  Discussed sleep apnea syndrome in detail including symptoms, workup, diagnostic process, treatment considerations including sleep studies, CPAP, and ENT surgery if abnormal upper airway physical abnormalities.  Will refer to pulmonary for initiation of diagnostic procedures to include sleep study if applicable.    Gastroesophageal reflux disease without esophagitis  Discussed the functional nature of this condition regarding what they eat, how they eat, when they eat, and how much they eat as all contributing to gastroesophageal symptoms.    Recommend anti-reflux diet and eating patterns emphasizing smaller more frequent meals and timing relative to bedtime.  Also recommend avoiding tomatoes, onions, spicy foods, caffeine, or any other food/beverage that cause increased reflux.    If symptoms not controlled on current therapies, then need to return for further evaluation and consideration of further studies.    -     omeprazole (PRILOSEC) 20 MG CR capsule; Take 1 capsule (20 mg) by mouth every morning (before breakfast)  -     CBC with Diff/Plt ([facility])        COUNSELING:  Reviewed preventive health counseling, as reflected in patient instructions       Healthy diet/nutrition    BP Readings from Last 1 Encounters:   09/21/18 116/74     Estimated body mass index is 32.46 kg/(m^2) as calculated from the following:    Height as of this encounter: 1.753 m (5' 9\").    Weight as of this encounter: 99.7 kg (219 lb 12.8 oz).           reports that he has never smoked. He has never used smokeless tobacco.      Counseling Resources:  ATP IV Guidelines  Pooled Cohorts Equation Calculator  FRAX Risk Assessment  ICSI Preventive Guidelines  Dietary Guidelines for Americans, 2010  USDA's MyPlate  ASA Prophylaxis  Lung CA Screening    Viral Rivera MD  [facility]  "

## 2018-09-21 NOTE — MR AVS SNAPSHOT
After Visit Summary   9/21/2018    Dion Thomson    MRN: [id number]           Patient Information     Date Of Birth          1970        Visit Information        Provider Department      9/21/2018 7:45 AM Viral Rivera MD [facility]        Today's Diagnoses     Screening for prostate cancer    -  1    Routine general medical examination at a health care facility        Intermittent asthma, uncomplicated        Hypercholesteremia        URBANO (obstructive sleep apnea)        Gastroesophageal reflux disease without esophagitis          Care Instructions      Preventive Health Recommendations  Male Ages 40 to 49    Yearly exam:             See your health care provider every year in order to  o   Review health changes.   o   Discuss preventive care.    o   Review your medicines if your doctor has prescribed any.    You should be tested each year for STDs (sexually transmitted diseases) if you re at risk.     Have a cholesterol test every 5 years.     Have a colonoscopy (test for colon cancer) if someone in your family has had colon cancer or polyps before age 50.     After age 45, have a diabetes test (fasting glucose). If you are at risk for diabetes, you should have this test every 3 years.      Talk with your health care provider about whether or not a prostate cancer screening test (PSA) is right for you.    Shots: Get a flu shot each year. Get a tetanus shot every 10 years.     Nutrition:    Eat at least 5 servings of fruits and vegetables daily.     Eat whole-grain bread, whole-wheat pasta and brown rice instead of white grains and rice.     Get adequate Calcium and Vitamin D.     Lifestyle    Exercise for at least 150 minutes a week (30 minutes a day, 5 days a week). This will help you control your weight and prevent disease.     Limit alcohol to one drink per day.     No smoking.     Wear sunscreen to prevent skin cancer.     See your dentist every six months for an exam and  "cleaning.              Follow-ups after your visit        Who to contact     If you have questions or need follow up information about today's clinic visit or your schedule please contact [facility] directly at [phone].  Normal or non-critical lab and imaging results will be communicated to you by [employer], letter or phone within 4 business days after the clinic has received the results. If you do not hear from us within 7 days, please contact the clinic through [employer] or phone. If you have a critical or abnormal lab result, we will notify you by phone as soon as possible.  Submit refill requests through [email or web address] or call your pharmacy and they will forward the refill request to us. Please allow 3 business days for your refill to be completed.          Additional Information About Your Visit        [employer] Information     [email or web address] gives you secure access to your electronic health record. If you see a primary care provider, you can also send messages to your care team and make appointments. If you have questions, please call your primary care clinic.  If you do not have a primary care provider, please call [phone] and they will assist you.        Care EveryWhere ID     This is your Care EveryWhere ID. This could be used by other organizations to access your [facility] medical records  [id number]        Your Vitals Were     Pulse Temperature Respirations Height Pulse Oximetry BMI (Body Mass Index)    68 98.2  F (36.8  C) (Oral) 12 1.753 m (5' 9\") 96% 32.46 kg/m2       Blood Pressure from Last 3 Encounters:   09/21/18 116/74   04/21/17 110/78   09/23/16 110/70    Weight from Last 3 Encounters:   09/21/18 99.7 kg (219 lb 12.8 oz)   04/21/17 103.4 kg (228 lb)   09/23/16 101.6 kg (224 lb)              We Performed the Following     CBC with Diff/Plt ([facility])     Comp. Metabolic Panel (14) ([facility])     Lipid Panel ([facility])     PSA Serum ([facility])          Today's Medication Changes          These changes " are accurate as of 9/21/18  8:18 AM.  If you have any questions, ask your nurse or doctor.               These medicines have changed or have updated prescriptions.        Dose/Directions    * atorvastatin 10 MG tablet   Commonly known as:  LIPITOR   This may have changed:  Another medication with the same name was changed. Make sure you understand how and when to take each.   Used for:  Encounter for medication refill   Changed by:  Viral Rivera MD        TAKE ONE TABLET BY MOUTH ONE TIME DAILY   Quantity:  90 tablet   Refills:  1       * atorvastatin 10 MG tablet   Commonly known as:  LIPITOR   This may have changed:  See the new instructions.   Used for:  Hypercholesteremia   Changed by:  Viral Rivera MD        Dose:  10 mg   Take 1 tablet (10 mg) by mouth daily   Quantity:  90 tablet   Refills:  3       omeprazole 20 MG CR capsule   Commonly known as:  priLOSEC   This may have changed:  Another medication with the same name was removed. Continue taking this medication, and follow the directions you see here.   Used for:  Gastroesophageal reflux disease without esophagitis   Changed by:  Viral Rivera MD        Dose:  20 mg   Take 1 capsule (20 mg) by mouth every morning (before breakfast)   Quantity:  90 capsule   Refills:  3       * Notice:  This list has 2 medication(s) that are the same as other medications prescribed for you. Read the directions carefully, and ask your doctor or other care provider to review them with you.         Where to get your medicines      These medications were sent to [place] [address] [place] [address] [address]     Phone:  [phone]     albuterol 108 (90 Base) MCG/ACT inhaler    atorvastatin 10 MG tablet    omeprazole 20 MG CR capsule                Primary Care Provider Office Phone # Fax #    Viral Rivera [phone] [address]  [facility] [address]         Equal Access to Services     [facility] Hospital[facility]ARJIT : Hadii aad ku hadapplepepper Haleyrosemarie, waivelisseda luqadaha, qaybta karobertorosemary swan. So [facility] [phone].    ATENCIÓN: Si habla español, tiene a cardoso disposición servicios gratuitos de asistencia lingüística. Llame al [phone].    We comply with applicable federal civil rights laws and [place] laws. We do not discriminate on the basis of race, color, national origin, age, disability, sex, sexual orientation, or gender identity.            Thank you!     Thank you for choosing [facility]  for your care. Our goal is always to provide you with excellent care. Hearing back from our patients is one way we can continue to improve our services. Please take a few minutes to complete the written survey that you may receive in the mail after your visit with us. Thank you!             Your Updated Medication List - Protect others around you: Learn how to safely use, store and throw away your medicines at www.disposemymeds.org.          This list is accurate as of 9/21/18  8:18 AM.  Always use your most recent med list.                   Brand Name Dispense Instructions for use Diagnosis    albuterol 108 (90 Base) MCG/ACT inhaler    PROAIR HFA/PROVENTIL HFA/VENTOLIN HFA    1 Inhaler    Inhale 2 puffs into the lungs every 6 hours as needed for shortness of breath / dyspnea    Intermittent asthma, uncomplicated       * atorvastatin 10 MG tablet    LIPITOR    90 tablet    TAKE ONE TABLET BY MOUTH ONE TIME DAILY    Encounter for medication refill       * atorvastatin 10 MG tablet    LIPITOR    90 tablet    Take 1 tablet (10 mg) by mouth daily    Hypercholesteremia       fluticasone 50 MCG/ACT spray    FLONASE    16 mL    USE TWO SPRAYS IN EACH NOSTRIL DAILY    Encounter for medication refill       omeprazole 20 MG CR capsule    priLOSEC    90 capsule    Take 1 capsule (20 mg) by mouth every morning (before breakfast)    Gastroesophageal  reflux disease without esophagitis       vitamin B complex with vitamin C Tabs tablet      Take 1 tablet by mouth daily        vitamin D 2000 units tablet     100 tablet    Take 2,000 Units by mouth daily        * Notice:  This list has 2 medication(s) that are the same as other medications prescribed for you. Read the directions carefully, and ask your doctor or other care provider to review them with you.

## 2018-09-21 NOTE — LETTER
My Asthma Action Plan  Name: Dion Thomson   YOB: 1970  Date: 9/21/2018   My doctor: Viral Rivera MD   My clinic: [facility]        My Control Medicine: flonase nasal spray prn  My Rescue Medicine: Albuterol (Proair/Ventolin/Proventil) inhaler prn   My Asthma Severity: intermittent  Avoid your asthma triggers: upper respiratory infections               GREEN ZONE   Good Control    I feel good    No cough or wheeze    Can work, sleep and play without asthma symptoms       Take your asthma control medicine every day.     1. If exercise triggers your asthma, take your rescue medication    15 minutes before exercise or sports, and    During exercise if you have asthma symptoms  2. Spacer to use with inhaler: If you have a spacer, make sure to use it with your inhaler             YELLOW ZONE Getting Worse  I have ANY of these:    I do not feel good    Cough or wheeze    Chest feels tight    Wake up at night   1. Keep taking your Green Zone medications  2. Start taking your rescue medicine:    every 20 minutes for up to 1 hour. Then every 4 hours for 24-48 hours.  3. If you stay in the Yellow Zone for more than 12-24 hours, contact your doctor.  4. If you do not return to the Green Zone in 12-24 hours or you get worse, start taking your oral steroid medicine if prescribed by your provider.           RED ZONE Medical Alert - Get Help  I have ANY of these:    I feel awful    Medicine is not helping    Breathing getting harder    Trouble walking or talking    Nose opens wide to breathe       1. Take your rescue medicine NOW  2. If your provider has prescribed an oral steroid medicine, start taking it NOW  3. Call your doctor NOW  4. If you are still in the Red Zone after 20 minutes and you have not reached your doctor:    Take your rescue medicine again and    Call 911 or go to the emergency room right away    See your regular doctor within 2 weeks of an Emergency Room or Urgent Care visit  for follow-up treatment.          Annual Reminders:  Meet with Asthma Educator,  Flu Shot in the Fall, consider Pneumonia Vaccination for patients with asthma (aged 19 and older).    Pharmacy: [facility] [id number] [place] [address]                      Asthma Triggers  How To Control Things That Make Your Asthma Worse    Triggers are things that make your asthma worse.  Look at the list below to help you find your triggers and what you can do about them.  You can help prevent asthma flare-ups by staying away from your triggers.      Trigger                                                          What you can do   Cigarette Smoke  Tobacco smoke can make asthma worse. Do not allow smoking in your home, car or around you.  Be sure no one smokes at a child s day care or school.  If you smoke, ask your health care provider for ways to help you quit.  Ask family members to quit too.  Ask your health care provider for a referral to Quit Plan to help you quit smoking, or call 9-229-413-PLAN.     Colds, Flu, Bronchitis  These are common triggers of asthma. Wash your hands often.  Don t touch your eyes, nose or mouth.  Get a flu shot every year.     Dust Mites  These are tiny bugs that live in cloth or carpet. They are too small to see. Wash sheets and blankets in hot water every week.   Encase pillows and mattress in dust mite proof covers.  Avoid having carpet if you can. If you have carpet, vacuum weekly.   Use a dust mask and HEPA vacuum.   Pollen and Outdoor Mold  Some people are allergic to trees, grass, or weed pollen, or molds. Try to keep your windows closed.  Limit time out doors when pollen count is high.   Ask you health care provider about taking medicine during allergy season.     Animal Dander  Some people are allergic to skin flakes, urine or saliva from pets with fur or feathers. Keep pets with fur or feathers out of your home.    If you can t keep the pet outdoors, then keep the pet out  of your bedroom.  Keep the bedroom door closed.  Keep pets off cloth furniture and away from stuffed toys.     Mice, Rats, and Cockroaches  Some people are allergic to the waste from these pests.   Cover food and garbage.  Clean up spills and food crumbs.  Store grease in the refrigerator.   Keep food out of the bedroom.   Indoor Mold  This can be a trigger if your home has high moisture. Fix leaking faucets, pipes, or other sources of water.   Clean moldy surfaces.  Dehumidify basement if it is damp and smelly.   Smoke, Strong Odors, and Sprays  These can reduce air quality. Stay away from strong odors and sprays, such as perfume, powder, hair spray, paints, smoke incense, paint, cleaning products, candles and new carpet.   Exercise or Sports  Some people with asthma have this trigger. Be active!  Ask your doctor about taking medicine before sports or exercise to prevent symptoms.    Warm up for 5-10 minutes before and after sports or exercise.     Other Triggers of Asthma  Cold air:  Cover your nose and mouth with a scarf.  Sometimes laughing or crying can be a trigger.  Some medicines and food can trigger asthma.

## 2018-09-22 LAB
ALBUMIN SERPL-MCNC: 4.7 G/DL (ref 3.5–5.5)
ALBUMIN/GLOB SERPL: 1.9 {RATIO} (ref 1.2–2.2)
ALP SERPL-CCNC: 57 IU/L (ref 39–117)
ALT SERPL-CCNC: 18 IU/L (ref 0–44)
AST SERPL-CCNC: 19 IU/L (ref 0–40)
BILIRUB SERPL-MCNC: 0.6 MG/DL (ref 0–1.2)
BUN SERPL-MCNC: 12 MG/DL (ref 6–24)
BUN/CREATININE RATIO: 12 (ref 9–20)
CALCIUM SERPL-MCNC: 9.6 MG/DL (ref 8.7–10.2)
CHLORIDE SERPLBLD-SCNC: 102 MMOL/L (ref 96–106)
CHOLEST SERPL-MCNC: 150 MG/DL (ref 100–199)
CREAT SERPL-MCNC: 1.03 MG/DL (ref 0.76–1.27)
EGFR IF AFRICN AM: 101 ML/MIN/1.73
EGFR IF NONAFRICN AM: 87 ML/MIN/1.73
GLOBULIN, TOTAL: 2.5 G/DL (ref 1.5–4.5)
GLUCOSE SERPL-MCNC: 102 MG/DL (ref 65–99)
HDLC SERPL-MCNC: 39 MG/DL
LDL/HDL RATIO: 2.1 RATIO (ref 0–3.6)
LDLC SERPL CALC-MCNC: 83 MG/DL (ref 0–99)
POTASSIUM SERPL-SCNC: 3.7 MMOL/L (ref 3.5–5.2)
PROT SERPL-MCNC: 7.2 G/DL (ref 6–8.5)
PSA NG/ML: 1.1 NG/ML (ref 0–4)
SODIUM SERPL-SCNC: 143 MMOL/L (ref 134–144)
TOTAL CO2: 23 MMOL/L (ref 20–29)
TRIGL SERPL-MCNC: 142 MG/DL (ref 0–149)
VLDLC SERPL CALC-MCNC: 28 MG/DL (ref 5–40)

## 2018-09-22 ASSESSMENT — ASTHMA QUESTIONNAIRES: ACT_TOTALSCORE: 16

## 2018-10-02 DIAGNOSIS — Z79.899 ENCOUNTER FOR LONG-TERM (CURRENT) USE OF MEDICATIONS: ICD-10-CM

## 2018-10-02 DIAGNOSIS — E78.00 HYPERCHOLESTEREMIA: ICD-10-CM

## 2018-10-02 RX ORDER — ATORVASTATIN CALCIUM 10 MG/1
TABLET, FILM COATED ORAL
Qty: 90 TABLET | Refills: 3 | Status: SHIPPED | OUTPATIENT
Start: 2018-10-02 | End: 2018-10-22

## 2018-10-02 NOTE — TELEPHONE ENCOUNTER
atorvastatin (LIPITOR) 10 MG   LAST  Med check 9/21/18    last labs(for RX) 9/21/18  Next  appt scheduled =  none  Dot Chavez MA    Recent Labs   Lab Test  09/21/18   0933  04/21/17   0815   CHOL  150  152   HDL  39*  34*   LDL  83  83   TRIG  142  173*

## 2018-10-09 ENCOUNTER — OFFICE VISIT (OUTPATIENT)
Dept: URGENT CARE | Facility: URGENT CARE | Age: 48
End: 2018-10-09

## 2018-10-09 ENCOUNTER — RADIANT APPOINTMENT (OUTPATIENT)
Dept: GENERAL RADIOLOGY | Facility: CLINIC | Age: 48
End: 2018-10-09
Attending: NURSE PRACTITIONER

## 2018-10-09 VITALS
HEIGHT: 69 IN | SYSTOLIC BLOOD PRESSURE: 111 MMHG | WEIGHT: 220 LBS | HEART RATE: 84 BPM | DIASTOLIC BLOOD PRESSURE: 72 MMHG | BODY MASS INDEX: 32.58 KG/M2 | TEMPERATURE: 97.4 F | OXYGEN SATURATION: 97 %

## 2018-10-09 DIAGNOSIS — R06.2 WHEEZING: ICD-10-CM

## 2018-10-09 DIAGNOSIS — J22 LRTI (LOWER RESPIRATORY TRACT INFECTION): Primary | ICD-10-CM

## 2018-10-09 LAB
BASOPHILS # BLD AUTO: 0 10E9/L (ref 0–0.2)
BASOPHILS NFR BLD AUTO: 0.2 %
DIFFERENTIAL METHOD BLD: NORMAL
EOSINOPHIL # BLD AUTO: 0.7 10E9/L (ref 0–0.7)
EOSINOPHIL NFR BLD AUTO: 6.8 %
ERYTHROCYTE [DISTWIDTH] IN BLOOD BY AUTOMATED COUNT: 13.2 % (ref 10–15)
HCT VFR BLD AUTO: 45.4 % (ref 40–53)
HGB BLD-MCNC: 15 G/DL (ref 13.3–17.7)
LYMPHOCYTES # BLD AUTO: 2.3 10E9/L (ref 0.8–5.3)
LYMPHOCYTES NFR BLD AUTO: 22.5 %
MCH RBC QN AUTO: 29.4 PG (ref 26.5–33)
MCHC RBC AUTO-ENTMCNC: 33 G/DL (ref 31.5–36.5)
MCV RBC AUTO: 89 FL (ref 78–100)
MONOCYTES # BLD AUTO: 0.9 10E9/L (ref 0–1.3)
MONOCYTES NFR BLD AUTO: 8.5 %
NEUTROPHILS # BLD AUTO: 6.2 10E9/L (ref 1.6–8.3)
NEUTROPHILS NFR BLD AUTO: 62 %
PLATELET # BLD AUTO: 251 10E9/L (ref 150–450)
RBC # BLD AUTO: 5.11 10E12/L (ref 4.4–5.9)
WBC # BLD AUTO: 10 10E9/L (ref 4–11)

## 2018-10-09 PROCEDURE — 71046 X-RAY EXAM CHEST 2 VIEWS: CPT

## 2018-10-09 PROCEDURE — 99203 OFFICE O/P NEW LOW 30 MIN: CPT | Mod: 25 | Performed by: NURSE PRACTITIONER

## 2018-10-09 PROCEDURE — 94640 AIRWAY INHALATION TREATMENT: CPT | Performed by: NURSE PRACTITIONER

## 2018-10-09 PROCEDURE — 85025 COMPLETE CBC W/AUTO DIFF WBC: CPT | Performed by: NURSE PRACTITIONER

## 2018-10-09 PROCEDURE — 36415 COLL VENOUS BLD VENIPUNCTURE: CPT | Performed by: NURSE PRACTITIONER

## 2018-10-09 RX ORDER — ALBUTEROL SULFATE 90 UG/1
2 AEROSOL, METERED RESPIRATORY (INHALATION) EVERY 6 HOURS PRN
Qty: 1 INHALER | Refills: 0 | Status: SHIPPED | OUTPATIENT
Start: 2018-10-09 | End: 2018-10-09

## 2018-10-09 RX ORDER — PREDNISONE 20 MG/1
40 TABLET ORAL DAILY
Qty: 10 TABLET | Refills: 0 | Status: SHIPPED | OUTPATIENT
Start: 2018-10-09 | End: 2024-06-24

## 2018-10-09 RX ORDER — ALBUTEROL SULFATE 90 UG/1
2 AEROSOL, METERED RESPIRATORY (INHALATION) EVERY 6 HOURS PRN
Qty: 1 INHALER | Refills: 0 | Status: SHIPPED | OUTPATIENT
Start: 2018-10-09 | End: 2018-10-22

## 2018-10-09 RX ORDER — IPRATROPIUM BROMIDE AND ALBUTEROL SULFATE 2.5; .5 MG/3ML; MG/3ML
1 SOLUTION RESPIRATORY (INHALATION) ONCE
Qty: 3 ML | Refills: 0 | Status: SHIPPED | OUTPATIENT
Start: 2018-10-09 | End: 2019-01-18

## 2018-10-09 NOTE — MR AVS SNAPSHOT
After Visit Summary   10/9/2018    Dion Thomson    MRN: [id number]           Patient Information     Date Of Birth          1970        Visit Information        Provider Department      10/9/2018 8:10 PM Anita Conway NP [facility]        Today's Diagnoses     Wheezing    -  1    LRTI (lower respiratory tract infection)          Care Instructions      Viral or Bacterial Bronchitis with Wheezing (Adult)    Bronchitis is an infection of the air passages. It often occurs during a cold and is usually caused by a virus. Symptoms include cough with mucus (phlegm) and low-grade fever. This illness is contagious during the first few days and is spread through the air by coughing and sneezing, or by direct contact (touching the sick person and then touching your own eyes, nose, or mouth).  If there is a lot of inflammation, air flow is restricted. The air passages may also go into spasm, especially if you have asthma. This causes wheezing and difficulty breathing even in people who do not have asthma.  Bronchitis usually lasts 7 to 14 days. The wheezing should improve with treatment during the first week. An inhaler is often prescribed to relax the air passages and stop wheezing. Antibiotics will be prescribed if your doctor thinks there is also a secondary bacterial infection.  Home care    If symptoms are severe, rest at home for the first 2 to 3 days. When you go back to your usual activities, don't let yourself get too tired.    Do not smoke. Also avoid being exposed to secondhand smoke.    You may use over-the-counter medicine to control fever or pain, unless another medicine was prescribed. Note: If you have chronic liver or kidney disease or have ever had a stomach ulcer or gastrointestinal bleeding, talk with your healthcare provider before using these medicines. Also talk to your provider if you are taking medicine to prevent blood clots.) Aspirin should never be given  to anyone younger than 18 years of age who is ill with a viral infection or fever. It may cause severe liver or brain damage.    Your appetite may be poor, so a light diet is fine. Avoid dehydration by drinking 6 to 8 glasses of fluids per day (such as water, soft drinks, sports drinks, juices, tea, or soup). Extra fluids will help loosen secretions in the nose and lungs.    Over-the-counter cough, cold, and sore-throat medicines will not shorten the length of the illness, but they may be helpful to reduce symptoms. (Note: Do not use decongestants if you have high blood pressure.)    If you were given an inhaler, use it exactly as directed. If you need to use it more often than prescribed, your condition may be worsening. If this happens, contact your healthcare provider.    If prescribed, finish all antibiotic medicine, even if you are feeling better after only a few days.  Follow-up care  Follow up with your healthcare provider, or as advised. If you had an X-ray or ECG (electrocardiogram), a specialist will review it. You will be notified of any new findings that may affect your care.  If you are age 65 or older, or if you have a chronic lung disease or condition that affects your immune system, or you smoke, ask your healthcare provider about getting a pneumococcal vaccine and a yearly flu shot (influenza vaccine).  When to seek medical advice  Call your healthcare provider right away if any of these occur:    Fever of 100.4 F (38 C) or higher, or as directed by your healthcare provider    Coughing up increasing amounts of colored sputum    Weakness, drowsiness, headache, facial pain, ear pain, or a stiff neck  Call 911  Call 911 if any of these occur.    Coughing up blood    Worsening weakness, drowsiness, headache, or stiff neck    Increased wheezing not helped with medication, shortness of breath, or pain with breathing  Date Last Reviewed: 9/13/2015 2000-2017 [employer]. [address], [place] [address]. All rights reserved. This information is not intended as a substitute for professional medical care. Always follow your healthcare professional's instructions.  Patient will follow up with primary this week                 Follow-ups after your visit        Who to contact     If you have questions or need follow up information about today's clinic visit or your schedule please contact [facility] directly at [phone].  Normal or non-critical lab and imaging results will be communicated to you by Orbot[employer], letter or phone within 4 business days after the clinic has received the results. If you do not hear from us within 7 days, please contact the clinic through Aqua-[employer] or phone. If you have a critical or abnormal lab result, we will notify you by phone as soon as possible.  Submit refill requests through Aqua-tools or call your pharmacy and they will forward the refill request to us. Please allow 3 business days for your refill to be completed.          Additional Information About Your Visit        Orbotix[employer] Journalist Information     Aqua-tools gives you secure access to your electronic health record. If you see a primary care provider, you can also send messages to your care team and make appointments. If you have questions, please call your primary care clinic.  If you do not have a primary care provider, please call [phone] and they will assist you.        Care EveryWhere ID     This is your Care EveryWhere ID. This could be used by other organizations to access your [facility] medical records  [id number]        Your Vitals Were     Pulse Temperature Height Pulse Oximetry BMI (Body Mass Index)       84 97.4  F (36.3  C) (Tympanic) 5' 9\" (1.753 m) 97% 32.49 kg/m2        Blood Pressure from Last 3 Encounters:   10/09/18 111/72   09/21/18 116/74   04/21/17 110/78    Weight from Last 3 Encounters:   10/09/18 220 lb (99.8 kg)   09/21/18 219 lb 12.8 oz (99.7 kg)   04/21/17 228 lb " (103.4 kg)              We Performed the Following     CBC with platelets differential          Today's Medication Changes          These changes are accurate as of 10/9/18  9:51 PM.  If you have any questions, ask your nurse or doctor.               Start taking these medicines.        Dose/Directions    amoxicillin-clavulanate 875-125 MG per tablet   Commonly known as:  AUGMENTIN   Used for:  LRTI (lower respiratory tract infection)   Started by:  Anita Conway NP        Dose:  1 tablet   Take 1 tablet by mouth 2 times daily   Quantity:  20 tablet   Refills:  0       ipratropium - albuterol 0.5 mg/2.5 mg/3 mL 0.5-2.5 (3) MG/3ML neb solution   Commonly known as:  DUONEB   Used for:  Wheezing   Started by:  Anita Conway NP        Dose:  1 vial   Take 1 vial (3 mLs) by nebulization once for 1 dose   Quantity:  3 mL   Refills:  0       predniSONE 20 MG tablet   Commonly known as:  DELTASONE   Used for:  Wheezing   Started by:  Anita Conway NP        Dose:  40 mg   Take 2 tablets (40 mg) by mouth daily for 5 days   Quantity:  10 tablet   Refills:  0         These medicines have changed or have updated prescriptions.        Dose/Directions    * albuterol 108 (90 Base) MCG/ACT inhaler   Commonly known as:  PROAIR HFA/PROVENTIL HFA/VENTOLIN HFA   This may have changed:  Another medication with the same name was added. Make sure you understand how and when to take each.   Used for:  Intermittent asthma, uncomplicated   Changed by:  Anita Conway NP        Dose:  2 puff   Inhale 2 puffs into the lungs every 6 hours as needed for shortness of breath / dyspnea   Quantity:  1 Inhaler   Refills:  4       * albuterol 108 (90 Base) MCG/ACT inhaler   Commonly known as:  PROAIR HFA/PROVENTIL HFA/VENTOLIN HFA   This may have changed:  You were already taking a medication with the same name, and this prescription was added. Make sure you understand how and when to take each.   Used for:  Wheezing   Changed by:  Man  DIEUDONNE Howard        Dose:  2 puff   Inhale 2 puffs into the lungs every 6 hours as needed for shortness of breath / dyspnea or wheezing   Quantity:  1 Inhaler   Refills:  0       * Notice:  This list has 2 medication(s) that are the same as other medications prescribed for you. Read the directions carefully, and ask your doctor or other care provider to review them with you.         Where to get your medicines      These medications were sent to [facility]/pharmacy #[id number] - [place], [place] - [address] AT [facility]  [address] [place] [address]    Hours:  24-hours Phone:  [phone]     albuterol 108 (90 Base) MCG/ACT inhaler    amoxicillin-clavulanate 875-125 MG per tablet    ipratropium - albuterol 0.5 mg/2.5 mg/3 mL 0.5-2.5 (3) MG/3ML neb solution    predniSONE 20 MG tablet                Primary Care Provider Office Phone # Fax #    Viral Rivera [phone] [address]  [facility] [address]        Equal Access to Services     : Hadii aad ku hadasho Soomaali, waaxda luqadaha, qaybta kaalmada adeegyada, rosemary fraire . So [facility] [phone].    ATENCIÓN: Si habla español, tiene a cardoso disposición servicios gratuitos de asistencia lingüística. [facility] [phone].    We comply with applicable federal civil rights laws and [place] laws. We do not discriminate on the basis of race, color, national origin, age, disability, sex, sexual orientation, or gender identity.            Thank you!     Thank you for choosing [facility]  for your care. Our goal is always to provide you with excellent care. Hearing back from our patients is one way we can continue to improve our services. Please take a few minutes to complete the written survey that you may receive in the mail after your visit with us. Thank you!             Your Updated Medication List - Protect others around you: Learn how to safely  use, store and throw away your medicines at www.disposemymeds.org.          This list is accurate as of 10/9/18  9:51 PM.  Always use your most recent med list.                   Brand Name Dispense Instructions for use Diagnosis    * albuterol 108 (90 Base) MCG/ACT inhaler    PROAIR HFA/PROVENTIL HFA/VENTOLIN HFA    1 Inhaler    Inhale 2 puffs into the lungs every 6 hours as needed for shortness of breath / dyspnea    Intermittent asthma, uncomplicated       * albuterol 108 (90 Base) MCG/ACT inhaler    PROAIR HFA/PROVENTIL HFA/VENTOLIN HFA    1 Inhaler    Inhale 2 puffs into the lungs every 6 hours as needed for shortness of breath / dyspnea or wheezing    Wheezing       amoxicillin-clavulanate 875-125 MG per tablet    AUGMENTIN    20 tablet    Take 1 tablet by mouth 2 times daily    LRTI (lower respiratory tract infection)       * atorvastatin 10 MG tablet    LIPITOR    90 tablet    TAKE ONE TABLET BY MOUTH ONE TIME DAILY    Encounter for medication refill       * atorvastatin 10 MG tablet    LIPITOR    90 tablet    Take 1 tablet (10 mg) by mouth daily    Hypercholesteremia       * atorvastatin 10 MG tablet    LIPITOR    90 tablet    TAKE 1 TABLET BY MOUTH EVERY DAY    Hypercholesteremia, Encounter for long-term (current) use of medications       fluticasone 50 MCG/ACT spray    FLONASE    16 mL    USE TWO SPRAYS IN EACH NOSTRIL DAILY    Encounter for medication refill       ipratropium - albuterol 0.5 mg/2.5 mg/3 mL 0.5-2.5 (3) MG/3ML neb solution    DUONEB    3 mL    Take 1 vial (3 mLs) by nebulization once for 1 dose    Wheezing       omeprazole 20 MG CR capsule    priLOSEC    90 capsule    Take 1 capsule (20 mg) by mouth every morning (before breakfast)    Gastroesophageal reflux disease without esophagitis       predniSONE 20 MG tablet    DELTASONE    10 tablet    Take 2 tablets (40 mg) by mouth daily for 5 days    Wheezing       vitamin B complex with vitamin C Tabs tablet      Take 1 tablet by mouth daily         vitamin D 2000 units tablet     100 tablet    Take 2,000 Units by mouth daily        * Notice:  This list has 5 medication(s) that are the same as other medications prescribed for you. Read the directions carefully, and ask your doctor or other care provider to review them with you.

## 2018-10-10 ENCOUNTER — OFFICE VISIT (OUTPATIENT)
Dept: FAMILY MEDICINE | Facility: CLINIC | Age: 48
End: 2018-10-10

## 2018-10-10 VITALS
OXYGEN SATURATION: 97 % | BODY MASS INDEX: 32.78 KG/M2 | DIASTOLIC BLOOD PRESSURE: 64 MMHG | SYSTOLIC BLOOD PRESSURE: 112 MMHG | TEMPERATURE: 97.9 F | WEIGHT: 222 LBS | HEART RATE: 84 BPM | RESPIRATION RATE: 16 BRPM

## 2018-10-10 DIAGNOSIS — J45.21 MILD INTERMITTENT ASTHMA WITH EXACERBATION: Primary | ICD-10-CM

## 2018-10-10 PROCEDURE — 99214 OFFICE O/P EST MOD 30 MIN: CPT | Performed by: FAMILY MEDICINE

## 2018-10-10 RX ORDER — IPRATROPIUM BROMIDE AND ALBUTEROL SULFATE 2.5; .5 MG/3ML; MG/3ML
1 SOLUTION RESPIRATORY (INHALATION) EVERY 6 HOURS PRN
Qty: 30 VIAL | Refills: 1 | Status: SHIPPED | OUTPATIENT
Start: 2018-10-10 | End: 2019-07-17

## 2018-10-10 RX ORDER — IPRATROPIUM BROMIDE AND ALBUTEROL SULFATE 2.5; .5 MG/3ML; MG/3ML
1 SOLUTION RESPIRATORY (INHALATION) EVERY 6 HOURS PRN
Qty: 30 VIAL | Refills: 1 | Status: SHIPPED | OUTPATIENT
Start: 2018-10-10 | End: 2018-10-10

## 2018-10-10 NOTE — MR AVS SNAPSHOT
After Visit Summary   10/10/2018    Dion Thomson    MRN: [id number]           Patient Information     Date Of Birth          1970        Visit Information        Provider Department      10/10/2018 10:30 AM Rachel Stone MD [facility]        Today's Diagnoses     Mild intermittent asthma with exacerbation    -  1       Follow-ups after your visit        Who to contact     If you have questions or need follow up information about today's clinic visit or your schedule please contact [facility] directly at [phone].  Normal or non-critical lab and imaging results will be communicated to you by Carina Technologyhart, letter or phone within 4 business days after the clinic has received the results. If you do not hear from us within 7 days, please contact the clinic through [employer] or phone. If you have a critical or abnormal lab result, we will notify you by phone as soon as possible.  Submit refill requests through [employer] or call your pharmacy and they will forward the refill request to us. Please allow 3 business days for your refill to be completed.          Additional Information About Your Visit        MyChart Information     [employer] gives you secure access to your electronic health record. If you see a primary care provider, you can also send messages to your care team and make appointments. If you have questions, please call your primary care clinic.  If you do not have a primary care provider, please call [phone] and they will assist you.        Care EveryWhere ID     This is your Care EveryWhere ID. This could be used by other organizations to access your [facility] medical records  [id number]        Your Vitals Were     Pulse Temperature Respirations Pulse Oximetry BMI (Body Mass Index)       84 97.9  F (36.6  C) 16 97% 32.78 kg/m2        Blood Pressure from Last 3 Encounters:   10/10/18 112/64   10/09/18 111/72   09/21/18 116/74    Weight from Last 3 Encounters:    10/10/18 100.7 kg (222 lb)   10/09/18 99.8 kg (220 lb)   09/21/18 99.7 kg (219 lb 12.8 oz)              Today, you had the following     No orders found for display         Today's Medication Changes          These changes are accurate as of 10/10/18 11:59 PM.  If you have any questions, ask your nurse or doctor.               These medicines have changed or have updated prescriptions.        Dose/Directions    * ipratropium - albuterol 0.5 mg/2.5 mg/3 mL 0.5-2.5 (3) MG/3ML neb solution   Commonly known as:  DUONEB   This may have changed:  Another medication with the same name was added. Make sure you understand how and when to take each.   Used for:  Wheezing   Changed by:  Rachel Stone MD        Dose:  1 vial   Take 1 vial (3 mLs) by nebulization once for 1 dose   Quantity:  3 mL   Refills:  0       * ipratropium - albuterol 0.5 mg/2.5 mg/3 mL 0.5-2.5 (3) MG/3ML neb solution   Commonly known as:  DUONEB   This may have changed:  You were already taking a medication with the same name, and this prescription was added. Make sure you understand how and when to take each.   Used for:  Mild intermittent asthma with exacerbation   Changed by:  Rachel Stone MD        Dose:  1 vial   Take 1 vial (3 mLs) by nebulization every 6 hours as needed for shortness of breath / dyspnea or wheezing   Quantity:  30 vial   Refills:  1       * Notice:  This list has 2 medication(s) that are the same as other medications prescribed for you. Read the directions carefully, and ask your doctor or other care provider to review them with you.         Where to get your medicines      These medications were sent to [place] [id number] [place] [address] [address]     Phone:  [phone]     ipratropium - albuterol 0.5 mg/2.5 mg/3 mL 0.5-2.5 (3) MG/3ML neb solution                Primary Care Provider Office Phone # Fax #    Viral Rivera [phone]        [address]  [facility] [address]        Equal Access to Services     BRIANNABONNY VENKATESH : Hadii aad ku hadappleo Soomaali, waaxda luqadaha, qaybta kaalmada adekarishmada, rosemary mcclendon elsaelba simmsyunier mandujano juno connor. So [facility] [phone].    ATENCIÓN: Si habla español, tiene a cardoso disposición servicios gratuitos de asistencia lingüística. Llame al [phone].    We comply with applicable federal civil rights laws and [place] laws. We do not discriminate on the basis of race, color, national origin, age, disability, sex, sexual orientation, or gender identity.            Thank you!     Thank you for choosing [facility]  for your care. Our goal is always to provide you with excellent care. Hearing back from our patients is one way we can continue to improve our services. Please take a few minutes to complete the written survey that you may receive in the mail after your visit with us. Thank you!             Your Updated Medication List - Protect others around you: Learn how to safely use, store and throw away your medicines at www.disposemymeds.org.          This list is accurate as of 10/10/18 11:59 PM.  Always use your most recent med list.                   Brand Name Dispense Instructions for use Diagnosis    * albuterol 108 (90 Base) MCG/ACT inhaler    PROAIR HFA/PROVENTIL HFA/VENTOLIN HFA    1 Inhaler    Inhale 2 puffs into the lungs every 6 hours as needed for shortness of breath / dyspnea    Intermittent asthma, uncomplicated       * albuterol 108 (90 Base) MCG/ACT inhaler    PROAIR HFA/PROVENTIL HFA/VENTOLIN HFA    1 Inhaler    Inhale 2 puffs into the lungs every 6 hours as needed for shortness of breath / dyspnea or wheezing    Wheezing       amoxicillin-clavulanate 875-125 MG per tablet    AUGMENTIN    20 tablet    Take 1 tablet by mouth 2 times daily    LRTI (lower respiratory tract infection)       * atorvastatin 10 MG tablet    LIPITOR    90 tablet    TAKE ONE TABLET BY MOUTH ONE TIME DAILY     Encounter for medication refill       * atorvastatin 10 MG tablet    LIPITOR    90 tablet    Take 1 tablet (10 mg) by mouth daily    Hypercholesteremia       * atorvastatin 10 MG tablet    LIPITOR    90 tablet    TAKE 1 TABLET BY MOUTH EVERY DAY    Hypercholesteremia, Encounter for long-term (current) use of medications       fluticasone 50 MCG/ACT spray    FLONASE    16 mL    USE TWO SPRAYS IN EACH NOSTRIL DAILY    Encounter for medication refill       * ipratropium - albuterol 0.5 mg/2.5 mg/3 mL 0.5-2.5 (3) MG/3ML neb solution    DUONEB    3 mL    Take 1 vial (3 mLs) by nebulization once for 1 dose    Wheezing       * ipratropium - albuterol 0.5 mg/2.5 mg/3 mL 0.5-2.5 (3) MG/3ML neb solution    DUONEB    30 vial    Take 1 vial (3 mLs) by nebulization every 6 hours as needed for shortness of breath / dyspnea or wheezing    Mild intermittent asthma with exacerbation       omeprazole 20 MG CR capsule    priLOSEC    90 capsule    Take 1 capsule (20 mg) by mouth every morning (before breakfast)    Gastroesophageal reflux disease without esophagitis       predniSONE 20 MG tablet    DELTASONE    10 tablet    Take 2 tablets (40 mg) by mouth daily for 5 days    Wheezing       vitamin B complex with vitamin C Tabs tablet      Take 1 tablet by mouth daily        vitamin D 2000 units tablet     100 tablet    Take 2,000 Units by mouth daily        * Notice:  This list has 7 medication(s) that are the same as other medications prescribed for you. Read the directions carefully, and ask your doctor or other care provider to review them with you.

## 2018-10-10 NOTE — PROGRESS NOTES
SUBJECTIVE:   Dion Thomson is a 48 year old male who presents to clinic today for the following health issues:  Urgent Care f/u    Non productive cough  Prednisone 40 mg x5 days and Augmentin 875 mg BID for 10 day started today  Albuterol inhaler 2 puffs 3-4 times per day    Wheezing still    Job therapist        Problem list and histories reviewed & adjusted, as indicated.  Additional history: as documented    Patient Active Problem List   Diagnosis     Esophageal reflux     Intermittent asthma     Obesity     URBANO (obstructive sleep apnea)     Hypercholesteremia     Encounter for long-term (current) use of medications     Past Surgical History:   Procedure Laterality Date     ARTHROSCOPIC RECONSTRUCTION ANTERIOR CRUCIATE LIGAMENT  1997    Open, Knee Left     ARTHROSCOPY KNEE RT/LT  4/05    Left     GENITOURINARY SURGERY      Vasectomy     TONSILLECTOMY & ADENOIDECTOMY  5 yo       Social History   Substance Use Topics     Smoking status: Never Smoker     Smokeless tobacco: Never Used     Alcohol use 0.0 - 0.5 oz/week     0 - 1 Standard drinks or equivalent per week      Comment: little     Family History   Problem Relation Age of Onset     Coronary Artery Disease Father          Current Outpatient Prescriptions   Medication Sig Dispense Refill     albuterol (PROAIR HFA/PROVENTIL HFA/VENTOLIN HFA) 108 (90 Base) MCG/ACT inhaler Inhale 2 puffs into the lungs every 6 hours as needed for shortness of breath / dyspnea or wheezing 1 Inhaler 0     albuterol (PROAIR HFA/PROVENTIL HFA/VENTOLIN HFA) 108 (90 Base) MCG/ACT inhaler Inhale 2 puffs into the lungs every 6 hours as needed for shortness of breath / dyspnea 1 Inhaler 4     amoxicillin-clavulanate (AUGMENTIN) 875-125 MG per tablet Take 1 tablet by mouth 2 times daily 20 tablet 0     atorvastatin (LIPITOR) 10 MG tablet TAKE 1 TABLET BY MOUTH EVERY DAY 90 tablet 3     atorvastatin (LIPITOR) 10 MG tablet Take 1 tablet (10 mg) by mouth daily 90 tablet 3     atorvastatin  (LIPITOR) 10 MG tablet TAKE ONE TABLET BY MOUTH ONE TIME DAILY 90 tablet 1     Cholecalciferol (VITAMIN D) 2000 UNITS tablet Take 2,000 Units by mouth daily 100 tablet 3     fluticasone (FLONASE) 50 MCG/ACT spray USE TWO SPRAYS IN EACH NOSTRIL DAILY 16 mL 3     ipratropium - albuterol 0.5 mg/2.5 mg/3 mL (DUONEB) 0.5-2.5 (3) MG/3ML neb solution Take 1 vial (3 mLs) by nebulization once for 1 dose 3 mL 0     omeprazole (PRILOSEC) 20 MG CR capsule Take 1 capsule (20 mg) by mouth every morning (before breakfast) 90 capsule 3     predniSONE (DELTASONE) 20 MG tablet Take 2 tablets (40 mg) by mouth daily for 5 days 10 tablet 0     vitamin  B complex with vitamin C (VITAMIN  B COMPLEX) TABS Take 1 tablet by mouth daily       Allergies   Allergen Reactions     No Clinical Screening - See Comments Hives     Pain medication - unknown - from 1st knee surgery     Recent Labs   Lab Test  09/21/18   0933  04/21/17   0815  02/15/16   1557   LDL  83  83  139*   HDL  39*  34*  38*   TRIG  142  173*  203*   ALT  18  22  17   CR  1.03  0.84  0.85   POTASSIUM  3.7  4.7  4.3      BP Readings from Last 3 Encounters:   10/09/18 111/72   09/21/18 116/74   04/21/17 110/78    Wt Readings from Last 3 Encounters:   10/09/18 99.8 kg (220 lb)   09/21/18 99.7 kg (219 lb 12.8 oz)   04/21/17 103.4 kg (228 lb)                  Labs reviewed in EPIC    Reviewed and updated as needed this visit by clinical staff       Reviewed and updated as needed this visit by Provider         ROS:  Constitutional, HEENT, cardiovascular, pulmonary, GI, , musculoskeletal, neuro, skin, endocrine and psych systems are negative, except as otherwise noted.    OBJECTIVE:     /64  Pulse 84  Temp 97.9  F (36.6  C)  Resp 16  Wt 100.7 kg (222 lb)  SpO2 97%  BMI 32.78 kg/m2  There is no height or weight on file to calculate BMI.  GENERAL: healthy, alert and no distress  EYES: Eyes grossly normal to inspection, PERRL and conjunctivae and sclerae normal  HENT: ear  canals and TM's normal, nose and mouth without ulcers or lesions  NECK: no adenopathy, no asymmetry, masses, or scars and thyroid normal to palpation  RESP: lungs clear to auscultation - no rales, rhonchi positive wheezes clearing most with duo neb treatment  CV: regular rate and rhythm, normal S1 S2, no S3 or S4, no murmur, click or rub, no peripheral edema and peripheral pulses strong  ABDOMEN: soft, nontender, no hepatosplenomegaly, no masses and bowel sounds normal  MS: no gross musculoskeletal defects noted, no edema  SKIN: no suspicious lesions or rashes  NEURO: Normal strength and tone, mentation intact and speech normal  PSYCH: mentation appears normal, affect normal/bright  LYMPH: no cervical, supraclavicular, axillary, or inguinal adenopathy    Diagnostic Test Results:  Results for orders placed or performed in visit on 10/09/18   XR Chest 2 Views    Narrative    XR CHEST 2 VW 10/10/2018 7:24 AM    HISTORY: ; Wheezing      Impression    IMPRESSION: Negative.    CHELSIE JAVIER MD       ASSESSMENT/PLAN:     ASSESSMENT / PLAN:  (J45.21) Mild intermittent asthma with exacerbation  (primary encounter diagnosis)  Comment:   Plan: ipratropium - albuterol 0.5 mg/2.5 mg/3 mL         (DUONEB) 0.5-2.5 (3) MG/3ML neb solution,                           Rachel Stone MD  [facility]

## 2018-10-10 NOTE — PROGRESS NOTES
SUBJECTIVE:   Dion Thomson is a 48 year old male patient (history of asthma) presenting with a chief complaint of intermittent productive cough with wheezing and chest tightness with cough for 6 weeks which he has never had before. Patient took Mucinex which did help a little.  Patient saw his primary 3 weeks and patient thought he was getting better at that time but afterwards he got worse.    Onset of symptoms was 6 week(s) ago.  Course of illness is worsening.    Severity moderately severe  Current and Associated symptoms: cough - non-productive, cough - productive and wheezing  Treatment measures tried include Inhaler (Predisposing factors include HX of asthma.    Past Medical History:   Diagnosis Date     Hypercholesteremia      Intermittent asthma 12/20/2013     URBANO (obstructive sleep apnea) 12/19/2014     Current Outpatient Prescriptions   Medication Sig Dispense Refill     albuterol (PROAIR HFA/PROVENTIL HFA/VENTOLIN HFA) 108 (90 Base) MCG/ACT inhaler Inhale 2 puffs into the lungs every 6 hours as needed for shortness of breath / dyspnea 1 Inhaler 4     atorvastatin (LIPITOR) 10 MG tablet TAKE 1 TABLET BY MOUTH EVERY DAY 90 tablet 3     atorvastatin (LIPITOR) 10 MG tablet Take 1 tablet (10 mg) by mouth daily 90 tablet 3     atorvastatin (LIPITOR) 10 MG tablet TAKE ONE TABLET BY MOUTH ONE TIME DAILY 90 tablet 1     Cholecalciferol (VITAMIN D) 2000 UNITS tablet Take 2,000 Units by mouth daily 100 tablet 3     fluticasone (FLONASE) 50 MCG/ACT spray USE TWO SPRAYS IN EACH NOSTRIL DAILY 16 mL 3     omeprazole (PRILOSEC) 20 MG CR capsule Take 1 capsule (20 mg) by mouth every morning (before breakfast) 90 capsule 3     vitamin  B complex with vitamin C (VITAMIN  B COMPLEX) TABS Take 1 tablet by mouth daily       Social History   Substance Use Topics     Smoking status: Never Smoker     Smokeless tobacco: Never Used     Alcohol use 0.0 - 0.5 oz/week     0 - 1 Standard drinks or equivalent per week      Comment:  "little       ROS:  CONSTITUTIONAL:NEGATIVE for fever, chills,   INTEGUMENTARY/SKIN: NEGATIVE for rashes,  ENT/MOUTH: NEGATIVE for ear, mouth and throat problems  CV: NEGATIVE for palpitations or peripheral edema  GI: NEGATIVE for nausea, vomiting or diarrhea    OBJECTIVE:  /72  Pulse 84  Temp 97.4  F (36.3  C) (Tympanic)  Ht 5' 9\" (1.753 m)  Wt 220 lb (99.8 kg)  SpO2 97%  BMI 32.49 kg/m2  GENERAL APPEARANCE: healthy, alert and no distress  EYES: EOMI,  PERRL, conjunctiva clear  HENT: ear canals and TM's normal.  Nose and mouth without ulcers, erythema or lesions  NECK: supple, nontender, no lymphadenopathy  RESP: lungs to auscultation wheezes anterior and posterior  CV: regular rates and rhythm, normal S1 S2, no murmur noted  NEURO: normal speech and mentation  SKIN: no suspicious  rashes    ASSESSMENT: left lower respiratory tract infection     PLAN:  Duo Neb and listen to lungs post neb still wheezing anterior/posterior but less  Chest xray pending and reviewed with physician pending radiology   Lab Results   Component Value Date    WBC 10.0 10/09/2018     Lab Results   Component Value Date    RBC 5.11 10/09/2018     Lab Results   Component Value Date    HGB 15.0 10/09/2018     Lab Results   Component Value Date    HCT 45.4 10/09/2018     No components found for: MCT  Lab Results   Component Value Date    MCV 89 10/09/2018     Lab Results   Component Value Date    MCH 29.4 10/09/2018     Lab Results   Component Value Date    MCHC 33.0 10/09/2018     Lab Results   Component Value Date    RDW 13.2 10/09/2018     Lab Results   Component Value Date     10/09/2018   Prednisone 20 mg x2 tablets daily for 5 days  Augmentin 875 mg one tablet twice a day for 10 days   Albuterol inhaler x2 puffs every 4 to 6 hour prn cough wheeze or SOB  Follow up with primary physician or another provider within this practice this week due to his symptoms     "

## 2018-10-10 NOTE — PATIENT INSTRUCTIONS
Viral or Bacterial Bronchitis with Wheezing (Adult)    Bronchitis is an infection of the air passages. It often occurs during a cold and is usually caused by a virus. Symptoms include cough with mucus (phlegm) and low-grade fever. This illness is contagious during the first few days and is spread through the air by coughing and sneezing, or by direct contact (touching the sick person and then touching your own eyes, nose, or mouth).  If there is a lot of inflammation, air flow is restricted. The air passages may also go into spasm, especially if you have asthma. This causes wheezing and difficulty breathing even in people who do not have asthma.  Bronchitis usually lasts 7 to 14 days. The wheezing should improve with treatment during the first week. An inhaler is often prescribed to relax the air passages and stop wheezing. Antibiotics will be prescribed if your doctor thinks there is also a secondary bacterial infection.  Home care    If symptoms are severe, rest at home for the first 2 to 3 days. When you go back to your usual activities, don't let yourself get too tired.    Do not smoke. Also avoid being exposed to secondhand smoke.    You may use over-the-counter medicine to control fever or pain, unless another medicine was prescribed. Note: If you have chronic liver or kidney disease or have ever had a stomach ulcer or gastrointestinal bleeding, talk with your healthcare provider before using these medicines. Also talk to your provider if you are taking medicine to prevent blood clots.) Aspirin should never be given to anyone younger than 18 years of age who is ill with a viral infection or fever. It may cause severe liver or brain damage.    Your appetite may be poor, so a light diet is fine. Avoid dehydration by drinking 6 to 8 glasses of fluids per day (such as water, soft drinks, sports drinks, juices, tea, or soup). Extra fluids will help loosen secretions in the nose and lungs.    Over-the-counter  cough, cold, and sore-throat medicines will not shorten the length of the illness, but they may be helpful to reduce symptoms. (Note: Do not use decongestants if you have high blood pressure.)    If you were given an inhaler, use it exactly as directed. If you need to use it more often than prescribed, your condition may be worsening. If this happens, contact your healthcare provider.    If prescribed, finish all antibiotic medicine, even if you are feeling better after only a few days.  Follow-up care  Follow up with your healthcare provider, or as advised. If you had an X-ray or ECG (electrocardiogram), a specialist will review it. You will be notified of any new findings that may affect your care.  If you are age 65 or older, or if you have a chronic lung disease or condition that affects your immune system, or you smoke, ask your healthcare provider about getting a pneumococcal vaccine and a yearly flu shot (influenza vaccine).  When to seek medical advice  Call your healthcare provider right away if any of these occur:    Fever of 100.4 F (38 C) or higher, or as directed by your healthcare provider    Coughing up increasing amounts of colored sputum    Weakness, drowsiness, headache, facial pain, ear pain, or a stiff neck  Call 911  Call 911 if any of these occur.    Coughing up blood    Worsening weakness, drowsiness, headache, or stiff neck    Increased wheezing not helped with medication, shortness of breath, or pain with breathing  Date Last Reviewed: 9/13/2015 2000-2017 [employer]. [address] [place] [id number]. All rights reserved. This information is not intended as a substitute for professional medical care. Always follow your healthcare professional's instructions.  Patient will follow up with primary this week

## 2018-10-22 ENCOUNTER — OFFICE VISIT (OUTPATIENT)
Dept: FAMILY MEDICINE | Facility: CLINIC | Age: 48
End: 2018-10-22

## 2018-10-22 VITALS
WEIGHT: 221 LBS | SYSTOLIC BLOOD PRESSURE: 100 MMHG | BODY MASS INDEX: 32.64 KG/M2 | HEART RATE: 82 BPM | DIASTOLIC BLOOD PRESSURE: 72 MMHG | OXYGEN SATURATION: 95 % | TEMPERATURE: 97.7 F

## 2018-10-22 DIAGNOSIS — J45.21 MILD INTERMITTENT ASTHMA WITH EXACERBATION: Primary | ICD-10-CM

## 2018-10-22 DIAGNOSIS — J30.2 SEASONAL ALLERGIC RHINITIS, UNSPECIFIED TRIGGER: ICD-10-CM

## 2018-10-22 PROCEDURE — 94640 AIRWAY INHALATION TREATMENT: CPT | Performed by: FAMILY MEDICINE

## 2018-10-22 PROCEDURE — 99214 OFFICE O/P EST MOD 30 MIN: CPT | Mod: 25 | Performed by: FAMILY MEDICINE

## 2018-10-22 RX ORDER — CETIRIZINE HYDROCHLORIDE 10 MG/1
10 TABLET ORAL DAILY
COMMUNITY
End: 2019-09-26

## 2018-10-22 RX ORDER — AZITHROMYCIN 250 MG/1
TABLET, FILM COATED ORAL
Qty: 6 TABLET | Refills: 0 | Status: SHIPPED | OUTPATIENT
Start: 2018-10-22 | End: 2019-07-17

## 2018-10-22 RX ORDER — PREDNISONE 20 MG/1
TABLET ORAL
Qty: 20 TABLET | Refills: 0 | Status: SHIPPED | OUTPATIENT
Start: 2018-10-22 | End: 2019-07-17

## 2018-10-22 NOTE — PROGRESS NOTES
"\"Not getting better\"      SUBJECTIVE:   Dion Thomson is a 48 year old male who presents to clinic today for the following health issues:    Was seen in  10/9/18  CBC and CXR done per patient report negative   10/09/2018   Prednisone 20 mg x2 tablets daily for 5 days  Augmentin 875 mg one tablet twice a day for 10 days   Albuterol inhaler x2 puffs every 4 to 6 hour prn cough wheeze or SOB  Follow up with primary physician or another provider within this practice this week due to his symptoms       Prednisone and antibiotic finished Friday.     Now  Sleeping ok  Get up wheezing on arising worse in shower  At soccer game [employer]  Using albuterol inhaler 2-6 times per day- this was changed to Combi vent Respimat when away from his home neb machine.  Duo Neb twice a day          Problem list and histories reviewed & adjusted, as indicated.  Additional history: as documented    Patient Active Problem List   Diagnosis     Esophageal reflux     Intermittent asthma     Obesity     URBANO (obstructive sleep apnea)     Hypercholesteremia     Encounter for long-term (current) use of medications     Past Surgical History:   Procedure Laterality Date     ARTHROSCOPIC RECONSTRUCTION ANTERIOR CRUCIATE LIGAMENT  1997    Open, Knee Left     ARTHROSCOPY KNEE RT/LT  4/05    Left     GENITOURINARY SURGERY      Vasectomy     TONSILLECTOMY & ADENOIDECTOMY  5 yo       Social History   Substance Use Topics     Smoking status: Never Smoker     Smokeless tobacco: Never Used     Alcohol use 0.0 - 0.5 oz/week     0 - 1 Standard drinks or equivalent per week      Comment: little     Family History   Problem Relation Age of Onset     Coronary Artery Disease Father          Current Outpatient Prescriptions   Medication Sig Dispense Refill     albuterol (PROAIR HFA/PROVENTIL HFA/VENTOLIN HFA) 108 (90 Base) MCG/ACT inhaler Inhale 2 puffs into the lungs every 6 hours as needed for shortness of breath / dyspnea or wheezing 1 Inhaler 0     albuterol (PROAIR " HFA/PROVENTIL HFA/VENTOLIN HFA) 108 (90 Base) MCG/ACT inhaler Inhale 2 puffs into the lungs every 6 hours as needed for shortness of breath / dyspnea 1 Inhaler 4     amoxicillin-clavulanate (AUGMENTIN) 875-125 MG per tablet Take 1 tablet by mouth 2 times daily 20 tablet 0     atorvastatin (LIPITOR) 10 MG tablet TAKE 1 TABLET BY MOUTH EVERY DAY 90 tablet 3     atorvastatin (LIPITOR) 10 MG tablet Take 1 tablet (10 mg) by mouth daily 90 tablet 3     atorvastatin (LIPITOR) 10 MG tablet TAKE ONE TABLET BY MOUTH ONE TIME DAILY 90 tablet 1     Cholecalciferol (VITAMIN D) 2000 UNITS tablet Take 2,000 Units by mouth daily 100 tablet 3     fluticasone (FLONASE) 50 MCG/ACT spray USE TWO SPRAYS IN EACH NOSTRIL DAILY 16 mL 3     ipratropium - albuterol 0.5 mg/2.5 mg/3 mL (DUONEB) 0.5-2.5 (3) MG/3ML neb solution Take 1 vial (3 mLs) by nebulization every 6 hours as needed for shortness of breath / dyspnea or wheezing 30 vial 1     omeprazole (PRILOSEC) 20 MG CR capsule Take 1 capsule (20 mg) by mouth every morning (before breakfast) 90 capsule 3     vitamin  B complex with vitamin C (VITAMIN  B COMPLEX) TABS Take 1 tablet by mouth daily       Allergies   Allergen Reactions     No Clinical Screening - See Comments Hives     Pain medication - unknown - from 1st knee surgery     Recent Labs   Lab Test  09/21/18   0933  04/21/17   0815  02/15/16   1557   LDL  83  83  139*   HDL  39*  34*  38*   TRIG  142  173*  203*   ALT  18  22  17   CR  1.03  0.84  0.85   POTASSIUM  3.7  4.7  4.3      BP Readings from Last 3 Encounters:   10/10/18 112/64   10/09/18 111/72   09/21/18 116/74    Wt Readings from Last 3 Encounters:   10/10/18 100.7 kg (222 lb)   10/09/18 99.8 kg (220 lb)   09/21/18 99.7 kg (219 lb 12.8 oz)                  Labs reviewed in EPIC    Reviewed and updated as needed this visit by clinical staff       Reviewed and updated as needed this visit by Provider         ROS:  Constitutional, HEENT, cardiovascular, pulmonary, GI, ,  musculoskeletal, neuro, skin, endocrine and psych systems are negative, except as otherwise noted.    OBJECTIVE:     /72 (BP Location: Right arm, Patient Position: Sitting, Cuff Size: Adult Large)  Pulse 82  Temp 97.7  F (36.5  C) (Oral)  Wt 100.2 kg (221 lb)  SpO2 95%   L/min  BMI 32.64 kg/m2  There is no height or weight on file to calculate BMI.  GENERAL: healthy, alert and no distress  No cyanosis no stridor no retractions no ez/cords  EYES: Eyes grossly normal to inspection, PERRL and conjunctivae and sclerae normal  HENT: ear canals and TM's normal, nose and mouth without ulcers or lesions  NECK: no adenopathy, no asymmetry, masses, or scars and thyroid normal to palpation  RESP: lungs clear to auscultation - no rales, rhonchi positive wheezes clearing with duo neb.  CV: regular rate and rhythm, normal S1 S2, no S3 or S4, no murmur, click or rub, no peripheral edema and peripheral pulses strong  ABDOMEN: soft, nontender, no hepatosplenomegaly, no masses and bowel sounds normal  MS: no gross musculoskeletal defects noted, no edema  SKIN: no suspicious lesions or rashes  NEURO: Normal strength and tone, mentation intact and speech normal  PSYCH: mentation appears normal, affect normal/bright  LYMPH: no cervical, supraclavicular, axillary, or inguinal adenopathy    Diagnostic Test Results:  Results for orders placed or performed in visit on 10/09/18   XR Chest 2 Views    Narrative    XR CHEST 2 VW 10/10/2018 7:24 AM    HISTORY: ; Wheezing      Impression    IMPRESSION: Negative.    CHELSIE JAVIER MD       ASSESSMENT/PLAN:     ASSESSMENT / PLAN:  (J45.21) Mild intermittent asthma with exacerbation  (primary encounter diagnosis)  Comment: Augmentin, lower dose prednisone and albuterol slight improvement then worsened  Plan: Inhalation/Nebulizer, Initial, Normal Order,         Clinic Performed, ALBUTEROL/IPRATROPIUM 3ML         NEB, predniSONE (DELTASONE) 20 MG tablet,         azithromycin  (ZITHROMAX) 250 MG tablet,         Ipratropium-Albuterol (COMBIVENT RESPIMAT)          MCG/ACT inhaler            (J30.2) Seasonal allergic rhinitis, unspecified trigger  Comment:   Plan: cetirizine (ZYRTEC) 10 MG tablet                Rachel Stone MD  [facility]

## 2018-10-22 NOTE — PATIENT INSTRUCTIONS
Probiotics over the counter    Zpack antibiotic    Prednisone taper    Combivent for when not home /Duoneb at home    F/u 2 weeks

## 2018-10-22 NOTE — MR AVS SNAPSHOT
After Visit Summary   10/22/2018    Dion Thomson    MRN: [id number]           Patient Information     Date Of Birth          1970        Visit Information        Provider Department      10/22/2018 4:15 PM Rachel Stone MD [facility]        Today's Diagnoses     Mild intermittent asthma without complication    -  1      Care Instructions    Probiotics over the counter    Zpack antibiotic    Prednisone taper    Combivent for when not home /Duoneb at home    F/u 2 weeks              Follow-ups after your visit        Who to contact     If you have questions or need follow up information about today's clinic visit or your schedule please contact [facility] directly at [phone].  Normal or non-critical lab and imaging results will be communicated to you by Booxmediahart, letter or phone within 4 business days after the clinic has received the results. If you do not hear from us within 7 days, please contact the clinic through [email or web address] or phone. If you have a critical or abnormal lab result, we will notify you by phone as soon as possible.  Submit refill requests through [employer] or call your pharmacy and they will forward the refill request to us. Please allow 3 business days for your refill to be completed.          Additional Information About Your Visit        MyChart Information     [employer] gives you secure access to your electronic health record. If you see a primary care provider, you can also send messages to your care team and make appointments. If you have questions, please call your primary care clinic.  If you do not have a primary care provider, please call [phone] and they will assist you.        Care EveryWhere ID     This is your Care EveryWhere ID. This could be used by other organizations to access your [place] medical records  [id number]        Your Vitals Were     Pulse Temperature Pulse Oximetry Peak Flow BMI (Body Mass Index)       82 97.7  F  (36.5  C) (Oral) 95% 680 L/min 32.64 kg/m2        Blood Pressure from Last 3 Encounters:   10/22/18 100/72   10/10/18 112/64   10/09/18 111/72    Weight from Last 3 Encounters:   10/22/18 100.2 kg (221 lb)   10/10/18 100.7 kg (222 lb)   10/09/18 99.8 kg (220 lb)              We Performed the Following     Inhalation/Nebulizer, Initial, Normal Order, Clinic Performed          Today's Medication Changes          These changes are accurate as of 10/22/18  5:15 PM.  If you have any questions, ask your nurse or doctor.               Start taking these medicines.        Dose/Directions    azithromycin 250 MG tablet   Commonly known as:  ZITHROMAX   Used for:  Mild intermittent asthma without complication   Started by:  Rachel Stone MD        Two tablets first day, then one tablet daily for four days.   Quantity:  6 tablet   Refills:  0       predniSONE 20 MG tablet   Commonly known as:  DELTASONE   Used for:  Mild intermittent asthma without complication   Started by:  Rachel Stone MD        Take 3 tabs (60 mg) by mouth daily x 3 days, 2 tabs (40 mg) daily x 3 days, 1 tab (20 mg) daily x 3 days, then 1/2 tab (10 mg) x 3 days.   Quantity:  20 tablet   Refills:  0         These medicines have changed or have updated prescriptions.        Dose/Directions    * ipratropium - albuterol 0.5 mg/2.5 mg/3 mL 0.5-2.5 (3) MG/3ML neb solution   Commonly known as:  DUONEB   This may have changed:  Another medication with the same name was added. Make sure you understand how and when to take each.   Used for:  Mild intermittent asthma with exacerbation   Changed by:  Rachel Stone MD        Dose:  1 vial   Take 1 vial (3 mLs) by nebulization every 6 hours as needed for shortness of breath / dyspnea or wheezing   Quantity:  30 vial   Refills:  1       * Ipratropium-Albuterol  MCG/ACT inhaler   Commonly known as:  COMBIVENT RESPIMAT   This may have changed:  You were already taking a medication with the same  name, and this prescription was added. Make sure you understand how and when to take each.   Used for:  Mild intermittent asthma without complication   Changed by:  Rachel Stone MD        Dose:  1 puff   Inhale 1 puff into the lungs 4 times daily Not to exceed 6 doses per day.   Quantity:  1 Inhaler   Refills:  1       * Notice:  This list has 2 medication(s) that are the same as other medications prescribed for you. Read the directions carefully, and ask your doctor or other care provider to review them with you.         Where to get your medicines      These medications were sent to [place] [address] [place] [address] [address]     Phone:  [phone]     azithromycin 250 MG tablet    Ipratropium-Albuterol  MCG/ACT inhaler    predniSONE 20 MG tablet                Primary Care Provider Office Phone # Fax #    Viral Rivera [phone]  [facility] [address]        Equal Access to Services     [facility] : Hadii aad ku hadasho Soomaali, waaxda luqadaha, qaybta kaalmada adeegyada, waxay idiin hayfaithn jaclyn khmahendra fraire . So [facility] [phone].    ATENCIÓN: Si habla español, tiene a cardoso disposición servicios gratuitos de asistencia lingüística. Llame al [phone].    We comply with applicable federal civil rights laws and [place] laws. We do not discriminate on the basis of race, color, national origin, age, disability, sex, sexual orientation, or gender identity.            Thank you!     Thank you for choosing [facility]  for your care. Our goal is always to provide you with excellent care. Hearing back from our patients is one way we can continue to improve our services. Please take a few minutes to complete the written survey that you may receive in the mail after your visit with us. Thank you!             Your Updated Medication List - Protect others around you: Learn how to  safely use, store and throw away your medicines at www.disposemymeds.org.          This list is accurate as of 10/22/18  5:15 PM.  Always use your most recent med list.                   Brand Name Dispense Instructions for use Diagnosis    albuterol 108 (90 Base) MCG/ACT inhaler    PROAIR HFA/PROVENTIL HFA/VENTOLIN HFA    1 Inhaler    Inhale 2 puffs into the lungs every 6 hours as needed for shortness of breath / dyspnea    Intermittent asthma, uncomplicated       atorvastatin 10 MG tablet    LIPITOR    90 tablet    TAKE ONE TABLET BY MOUTH ONE TIME DAILY    Encounter for medication refill       azithromycin 250 MG tablet    ZITHROMAX    6 tablet    Two tablets first day, then one tablet daily for four days.    Mild intermittent asthma without complication       cetirizine 10 MG tablet    zyrTEC     Take 10 mg by mouth daily        fluticasone 50 MCG/ACT spray    FLONASE    16 mL    USE TWO SPRAYS IN EACH NOSTRIL DAILY    Encounter for medication refill       * ipratropium - albuterol 0.5 mg/2.5 mg/3 mL 0.5-2.5 (3) MG/3ML neb solution    DUONEB    30 vial    Take 1 vial (3 mLs) by nebulization every 6 hours as needed for shortness of breath / dyspnea or wheezing    Mild intermittent asthma with exacerbation       * Ipratropium-Albuterol  MCG/ACT inhaler    COMBIVENT RESPIMAT    1 Inhaler    Inhale 1 puff into the lungs 4 times daily Not to exceed 6 doses per day.    Mild intermittent asthma without complication       omeprazole 20 MG CR capsule    priLOSEC    90 capsule    Take 1 capsule (20 mg) by mouth every morning (before breakfast)    Gastroesophageal reflux disease without esophagitis       predniSONE 20 MG tablet    DELTASONE    20 tablet    Take 3 tabs (60 mg) by mouth daily x 3 days, 2 tabs (40 mg) daily x 3 days, 1 tab (20 mg) daily x 3 days, then 1/2 tab (10 mg) x 3 days.    Mild intermittent asthma without complication       vitamin B complex with vitamin C Tabs tablet      Take 1 tablet by mouth  daily        vitamin D 2000 units tablet     100 tablet    Take 2,000 Units by mouth daily        * Notice:  This list has 2 medication(s) that are the same as other medications prescribed for you. Read the directions carefully, and ask your doctor or other care provider to review them with you.

## 2018-11-02 NOTE — PROGRESS NOTES
"  SUBJECTIVE:   Dion Thomson is a 48 year old male who presents to clinic today for the following health issues:      {Superlists:946249}    {additional problems for provider to add:716667}    Problem list and histories reviewed & adjusted, as indicated.  Additional history: {NONE - AS DOCUMENTED:056668::\"as documented\"}    {HIST REVIEW/ LINKS 2:147892}    Reviewed and updated as needed this visit by clinical staff       Reviewed and updated as needed this visit by Provider         {PROVIDER CHARTING PREFERENCE:058916}  "

## 2018-11-05 ENCOUNTER — OFFICE VISIT (OUTPATIENT)
Dept: FAMILY MEDICINE | Facility: CLINIC | Age: 48
End: 2018-11-05

## 2018-11-05 DIAGNOSIS — Z53.9 ERRONEOUS ENCOUNTER--DISREGARD: Primary | ICD-10-CM

## 2018-11-05 PROCEDURE — 100000 ZZC ERRONEOUS ENCOUNTER--DISREGARD: Performed by: FAMILY MEDICINE

## 2018-11-05 NOTE — MR AVS SNAPSHOT
After Visit Summary   11/5/2018    Dion Thomson    MRN: [id number]           Patient Information     Date Of Birth          1970        Today's Diagnoses     ERRONEOUS ENCOUNTER--DISREGARD    -  1       Follow-ups after your visit        Who to contact     If you have questions or need follow up information about today's clinic visit or your schedule please contact [facility] directly at [phone].  Normal or non-critical lab and imaging results will be communicated to you by "[employer] Simple[employer] Fast and Elegant Tablet [employer] App"hart, letter or phone within 4 business days after the clinic has received the results. If you do not hear from us within 7 days, please contact the clinic through Footbalistict or phone. If you have a critical or abnormal lab result, we will notify you by phone as soon as possible.  Submit refill requests through [employer] or call your pharmacy and they will forward the refill request to us. Please allow 3 business days for your refill to be completed.          Additional Information About Your Visit        MyChart Information     [employer] gives you secure access to your electronic health record. If you see a primary care provider, you can also send messages to your care team and make appointments. If you have questions, please call your primary care clinic.  If you do not have a primary care provider, please call [phone] and they will assist you.        Care EveryWhere ID     This is your Care EveryWhere ID. This could be used by other organizations to access your [place] medical records  [id number]         Blood Pressure from Last 3 Encounters:   10/22/18 100/72   10/10/18 112/64   10/09/18 111/72    Weight from Last 3 Encounters:   10/22/18 100.2 kg (221 lb)   10/10/18 100.7 kg (222 lb)   10/09/18 99.8 kg (220 lb)              Today, you had the following     No orders found for display       Primary Care Provider Office Phone # Fax #    Viral Rivera [phone]  [facility] [address]        Equal Access to Services     RICKY RIDDLE : Hadii jamari ku hadappleo Soruddyali, waaxda luqadaha, qaybta kaalmada mendezalbert, rosemary mcclendon elsaelba anaya nazia juno . So [facility] [phone].    ATENCIÓN: Si habla español, tiene a cardoso disposición servicios gratuitos de asistencia lingüística. Llame al [phone].    We comply with applicable federal civil rights laws and [place] laws. We do not discriminate on the basis of race, color, national origin, age, disability, sex, sexual orientation, or gender identity.            Thank you!     Thank you for choosing [facility]  for your care. Our goal is always to provide you with excellent care. Hearing back from our patients is one way we can continue to improve our services. Please take a few minutes to complete the written survey that you may receive in the mail after your visit with us. Thank you!             Your Updated Medication List - Protect others around you: Learn how to safely use, store and throw away your medicines at www.disposemymeds.org.          This list is accurate as of 11/5/18 11:59 PM.  Always use your most recent med list.                   Brand Name Dispense Instructions for use Diagnosis    albuterol 108 (90 Base) MCG/ACT inhaler    PROAIR HFA/PROVENTIL HFA/VENTOLIN HFA    1 Inhaler    Inhale 2 puffs into the lungs every 6 hours as needed for shortness of breath / dyspnea    Intermittent asthma, uncomplicated       atorvastatin 10 MG tablet    LIPITOR    90 tablet    TAKE ONE TABLET BY MOUTH ONE TIME DAILY    Encounter for medication refill       azithromycin 250 MG tablet    ZITHROMAX    6 tablet    Two tablets first day, then one tablet daily for four days.    Mild intermittent asthma with exacerbation       cetirizine 10 MG tablet    zyrTEC     Take 10 mg by mouth daily    Seasonal allergic rhinitis, unspecified trigger       fluticasone 50 MCG/ACT spray    FLONASE    16 mL    USE TWO  SPRAYS IN EACH NOSTRIL DAILY    Encounter for medication refill       * ipratropium - albuterol 0.5 mg/2.5 mg/3 mL 0.5-2.5 (3) MG/3ML neb solution    DUONEB    30 vial    Take 1 vial (3 mLs) by nebulization every 6 hours as needed for shortness of breath / dyspnea or wheezing    Mild intermittent asthma with exacerbation       * Ipratropium-Albuterol  MCG/ACT inhaler    COMBIVENT RESPIMAT    1 Inhaler    Inhale 1 puff into the lungs 4 times daily Not to exceed 6 doses per day.    Mild intermittent asthma with exacerbation       omeprazole 20 MG CR capsule    priLOSEC    90 capsule    Take 1 capsule (20 mg) by mouth every morning (before breakfast)    Gastroesophageal reflux disease without esophagitis       predniSONE 20 MG tablet    DELTASONE    20 tablet    Take 3 tabs (60 mg) by mouth daily x 3 days, 2 tabs (40 mg) daily x 3 days, 1 tab (20 mg) daily x 3 days, then 1/2 tab (10 mg) x 3 days.    Mild intermittent asthma with exacerbation       vitamin B complex with vitamin C Tabs tablet      Take 1 tablet by mouth daily        vitamin D 2000 units tablet     100 tablet    Take 2,000 Units by mouth daily        * Notice:  This list has 2 medication(s) that are the same as other medications prescribed for you. Read the directions carefully, and ask your doctor or other care provider to review them with you.

## 2018-11-14 ENCOUNTER — MYC MEDICAL ADVICE (OUTPATIENT)
Dept: FAMILY MEDICINE | Facility: CLINIC | Age: 48
End: 2018-11-14

## 2018-11-14 DIAGNOSIS — J45.20 INTERMITTENT ASTHMA: ICD-10-CM

## 2018-11-14 DIAGNOSIS — R06.2 WHEEZE: Primary | ICD-10-CM

## 2018-11-20 ENCOUNTER — TRANSFERRED RECORDS (OUTPATIENT)
Dept: FAMILY MEDICINE | Facility: CLINIC | Age: 48
End: 2018-11-20

## 2018-11-20 ENCOUNTER — TRANSFERRED RECORDS (OUTPATIENT)
Dept: HEALTH INFORMATION MANAGEMENT | Facility: CLINIC | Age: 48
End: 2018-11-20

## 2018-12-17 ENCOUNTER — TRANSFERRED RECORDS (OUTPATIENT)
Dept: FAMILY MEDICINE | Facility: CLINIC | Age: 48
End: 2018-12-17

## 2018-12-17 ENCOUNTER — TRANSFERRED RECORDS (OUTPATIENT)
Dept: HEALTH INFORMATION MANAGEMENT | Facility: CLINIC | Age: 48
End: 2018-12-17

## 2018-12-29 DIAGNOSIS — Z76.0 ENCOUNTER FOR MEDICATION REFILL: ICD-10-CM

## 2018-12-30 RX ORDER — FLUTICASONE PROPIONATE 50 MCG
SPRAY, SUSPENSION (ML) NASAL
Qty: 16 ML | Refills: 0 | Status: SHIPPED | OUTPATIENT
Start: 2018-12-30 | End: 2023-08-16 | Stop reason: ALTCHOICE

## 2018-12-31 NOTE — TELEPHONE ENCOUNTER
This prescription had a transmission failure.  Call pharmacy and left on their voice mail.    Edgar Beach,   [facility]  [phone]

## 2019-01-18 ENCOUNTER — OFFICE VISIT (OUTPATIENT)
Dept: FAMILY MEDICINE | Facility: CLINIC | Age: 49
End: 2019-01-18

## 2019-01-18 VITALS
RESPIRATION RATE: 16 BRPM | OXYGEN SATURATION: 97 % | WEIGHT: 222 LBS | TEMPERATURE: 98.1 F | HEART RATE: 77 BPM | BODY MASS INDEX: 32.78 KG/M2 | DIASTOLIC BLOOD PRESSURE: 70 MMHG | SYSTOLIC BLOOD PRESSURE: 108 MMHG

## 2019-01-18 DIAGNOSIS — B49 FUNGAL SINUSITIS: Primary | ICD-10-CM

## 2019-01-18 DIAGNOSIS — R43.0 LOSS OF PERCEPTION FOR SMELL: ICD-10-CM

## 2019-01-18 DIAGNOSIS — R48.1 LOSS OF PERCEPTION FOR TASTE: ICD-10-CM

## 2019-01-18 DIAGNOSIS — J32.9 FUNGAL SINUSITIS: Primary | ICD-10-CM

## 2019-01-18 PROCEDURE — 99214 OFFICE O/P EST MOD 30 MIN: CPT | Performed by: FAMILY MEDICINE

## 2019-01-18 RX ORDER — FLUCONAZOLE 200 MG/1
200 TABLET ORAL DAILY
Qty: 14 TABLET | Refills: 0 | Status: SHIPPED | OUTPATIENT
Start: 2019-01-18 | End: 2019-04-25

## 2019-01-18 NOTE — PROGRESS NOTES
;  Problem(s) Oriented visit        SUBJECTIVE:                                                    Dion Thomson is a 48 year old male who presents to clinic today for the following health issues :        1. Fungal sinusitis  Likely cause for below  - fluconazole (DIFLUCAN) 200 MG tablet; Take 1 tablet (200 mg) by mouth daily for 14 days  Dispense: 14 tablet; Refill: 0    2. Loss of perception for taste  intermittant comes and goes  - fluconazole (DIFLUCAN) 200 MG tablet; Take 1 tablet (200 mg) by mouth daily for 14 days  Dispense: 14 tablet; Refill: 0    3. Loss of perception for smell  Now on symbicort  - fluconazole (DIFLUCAN) 200 MG tablet; Take 1 tablet (200 mg) by mouth daily for 14 days  Dispense: 14 tablet; Refill: 0         Problem list, Medication list, Allergies, and Medical/Social/Surgical histories reviewed in EPIC and updated as appropriate.   Additional history: as documented    ROS:  General and CV completed and negative except as noted above    Histories:   Patient Active Problem List   Diagnosis     Esophageal reflux     Intermittent asthma     Obesity     URBANO (obstructive sleep apnea)     Hypercholesteremia     Encounter for long-term (current) use of medications     Past Surgical History:   Procedure Laterality Date     ARTHROSCOPIC RECONSTRUCTION ANTERIOR CRUCIATE LIGAMENT  1997    Open, Knee Left     ARTHROSCOPY KNEE RT/LT  4/05    Left     GENITOURINARY SURGERY      Vasectomy     TONSILLECTOMY & ADENOIDECTOMY  5 yo       Social History     Tobacco Use     Smoking status: Never Smoker     Smokeless tobacco: Never Used   Substance Use Topics     Alcohol use: Yes     Alcohol/week: 0.0 - 0.5 oz     Comment: little     Family History   Problem Relation Age of Onset     Coronary Artery Disease Father            OBJECTIVE:                                                    /70   Pulse 77   Temp 98.1  F (36.7  C) (Oral)   Resp 16   Wt 100.7 kg (222 lb)   SpO2 97%   BMI 32.78 kg/m    Body  mass index is 32.78 kg/m .   APPEARANCE: = Relaxed and in no distress  Conj/Eyelids = noninjected and lids and lashes are without inflammation  PERRLA/Irises = Pupils Round Reactive to Light and Irisis without inflammation  Ears/Nose = External structures and Nares have usual shape and form  Ear canals and TM's = Canals are not inflammed and have none or little wax and the drums are not injected and have a light reflex   Lips/Teeth/Gums = No lesions seen, good dentition, and gums seem healthy  Oropharynx = No leukoplakia, No injection to the tissues, Normal Uvula  Neck = No anterior or posterior adenopathy appreciated.  Resp effort = Calm regular breathing  Breath Sounds = Good air movement with no rales or rhonchi in any lung fields  Mood/Affect = Cooperative and interested     ASSESSMENT/PLAN:                                                        Dion was seen today for recheck.    Diagnoses and all orders for this visit:    Fungal sinusitis  -     fluconazole (DIFLUCAN) 200 MG tablet; Take 1 tablet (200 mg) by mouth daily for 14 days    Loss of perception for taste  -     fluconazole (DIFLUCAN) 200 MG tablet; Take 1 tablet (200 mg) by mouth daily for 14 days    Loss of perception for smell  -     fluconazole (DIFLUCAN) 200 MG tablet; Take 1 tablet (200 mg) by mouth daily for 14 days    long discussion about possible fungal cause        The following health maintenance items are reviewed in Epic and correct as of today:  Health Maintenance   Topic Date Due     HIV SCREEN (SYSTEM ASSIGNED)  09/29/1990     PHQ-2 Q1 YR  04/21/2018     INFLUENZA VACCINE (1) 09/01/2018     ASTHMA CONTROL TEST Q6 MOS  03/21/2019     ASTHMA ACTION PLAN Q1 YR  09/21/2019     DTAP/TDAP/TD IMMUNIZATION (2 - Td) 08/08/2021     ZOSTER IMMUNIZATION (1 of 2) 09/29/2022     LIPID SCREEN Q5 YR MALE (SYSTEM ASSIGNED)  09/21/2023     IPV IMMUNIZATION  Aged Out     MENINGITIS IMMUNIZATION  Aged Out       Viral Rivera MD  [facility]  GROUP  Family Practice  [facility]  [phone]    For any issues my office # is [phone]    \

## 2019-01-19 ASSESSMENT — ASTHMA QUESTIONNAIRES: ACT_TOTALSCORE: 23

## 2019-02-05 ENCOUNTER — MYC MEDICAL ADVICE (OUTPATIENT)
Dept: FAMILY MEDICINE | Facility: CLINIC | Age: 49
End: 2019-02-05

## 2019-02-06 NOTE — TELEPHONE ENCOUNTER
From: Dion Thomson  To: Viral Rivera MD  Sent: 2/5/2019 10:38 PM CST  Subject: Updates about my health    Hi Dr. Rivera, as a follow up to my visit on 1/18/19, I competed the 14 day course of Fluconazole and the symptoms I reported at the visit remain. My sense of taste is a bit more intermittent, but by and large is not there along with my sense of smell. I continue to have what feels like congestion in my sinuses, but do not have regular discharge. I feel as if there is something either in my lungs or at the base of my esophagus. I also have at times a slight burning sensation in my eyes, nothing too intense, but noticeable. The Fluconazole does not seem to have made any difference. You had mentioned a few other possibilities of what this may be or directions to go. Should I see an ENT? If so, can I obtain a referral? I have [facility] through [facility], so a provider at the [facility] would be ideal.    Thank you!  Dion

## 2019-02-14 NOTE — TELEPHONE ENCOUNTER
February 14, 2019 patient left message on RN's voicemail wanting to let us know that he has opted to see ENT at [facility] and has an appt with them scheduled for 3/15/19. This is convenient location and knows insurance will cover [facility]. He will call us if any questions or concerns.  Minda Briggs RN

## 2019-03-13 NOTE — TELEPHONE ENCOUNTER
FUTURE VISIT INFORMATION      FUTURE VISIT INFORMATION:    Date: 3/15/19    Time: 7:15AM    [place] [facility]  [place]ERRAL INFORMATION:    Referring provider:  Dr Viral Rivera    Referring providers clinic:  [facility]    Reason for visit/diagnosis  Anosmia, Congestion    RECORDS REQUESTED FROM:       Clinic name Comments Records Status Imaging Status   [facility] 2/5/19 mychart advice encounter  1/1/19, 4/21/17, 12/19/14, 3/29/13 notes with Dr Rivera [facility] 12/17/18, 11/21/18 notes with Dr Shweta Lang Scanned in [facility]    [facility] 10/09/18 XR CHEST Epic PACS   [facility]  Sleep clinic 2/14/14, 3/14/14, 4/18/14 notes with Tabatha JEFF Care Everywhere

## 2019-03-15 ENCOUNTER — OFFICE VISIT (OUTPATIENT)
Dept: OTOLARYNGOLOGY | Facility: CLINIC | Age: 49
End: 2019-03-15
Payer: COMMERCIAL

## 2019-03-15 ENCOUNTER — PRE VISIT (OUTPATIENT)
Dept: OTOLARYNGOLOGY | Facility: CLINIC | Age: 49
End: 2019-03-15

## 2019-03-15 DIAGNOSIS — J34.3 NASAL TURBINATE HYPERTROPHY: ICD-10-CM

## 2019-03-15 DIAGNOSIS — J34.89 NASAL OBSTRUCTION: ICD-10-CM

## 2019-03-15 DIAGNOSIS — J30.9 ALLERGIC RHINITIS, UNSPECIFIED SEASONALITY, UNSPECIFIED TRIGGER: Primary | ICD-10-CM

## 2019-03-15 RX ORDER — AZELASTINE 1 MG/ML
1 SPRAY, METERED NASAL 2 TIMES DAILY
Qty: 30 ML | Refills: 2 | Status: SHIPPED | OUTPATIENT
Start: 2019-03-15 | End: 2019-10-14

## 2019-03-15 RX ORDER — MONTELUKAST SODIUM 10 MG/1
10 TABLET ORAL AT BEDTIME
Qty: 90 TABLET | Refills: 1 | Status: SHIPPED | OUTPATIENT
Start: 2019-03-15 | End: 2019-07-17

## 2019-03-15 RX ORDER — LORATADINE 10 MG/1
10 CAPSULE, LIQUID FILLED ORAL DAILY
Qty: 60 CAPSULE | Refills: 3 | Status: SHIPPED | OUTPATIENT
Start: 2019-03-15 | End: 2019-09-26

## 2019-03-15 ASSESSMENT — PAIN SCALES - GENERAL: PAINLEVEL: NO PAIN (0)

## 2019-03-15 NOTE — NURSING NOTE
Chief Complaint   Patient presents with     Consult     anosmia , congestion -unable to to take vital signs due to time constraints.      Jurgen Banks LPN    E

## 2019-03-15 NOTE — LETTER
3/15/2019       RE: Dion Thomson  [address] [facility] [facility] [address]     Dear Colleague,    Thank you for referring your patient, Dion Thomson, to the [facility] EAR NOSE AND THROAT at [facility]. Please see a copy of my visit note below.    The patient presents with a history of nasal obstruction and a lack of the sense of smell for the past four months.  The patient is having difficulty breathing through the nostrils.  The patient denies difficulty with sinus infections or facial pain, but he reports chronic rhinitis. He has recently been treated for asthma.  The patient denies ear infections.  The patient denies chronic or recurrent tonsillitis, chronic or recurrent pharyngitis. The patient denies otalgia, otorrhea, eustachian tube dysfunction, ear infections, dizziness or tinnitus.     This patient is seen in consultation at the request of Dr. Viral Rivera.    All other systems were reviewed and they are either negative or they are not directly pertinent to this Otolaryngology examination.      Past Medical History:    Past Medical History:   Diagnosis Date     Allergic rhinitis      Anxiety      Gastroesophageal reflux disease      Hypercholesteremia      Intermittent asthma 12/20/2013     Obstructive sleep apnea      URBANO (obstructive sleep apnea) 12/19/2014       Past Surgical History:    Past Surgical History:   Procedure Laterality Date     ADENOIDECTOMY       ARTHROSCOPIC RECONSTRUCTION ANTERIOR CRUCIATE LIGAMENT  1997    Open, Knee Left     ARTHROSCOPY KNEE RT/LT  4/05    Left     GENITOURINARY SURGERY      Vasectomy     TONSILLECTOMY       TONSILLECTOMY & ADENOIDECTOMY  5 yo       Medications:      Current Outpatient Medications:      albuterol (PROAIR HFA/PROVENTIL HFA/VENTOLIN HFA) 108 (90 Base) MCG/ACT inhaler, Inhale 2 puffs into the lungs every 6 hours as needed for shortness of breath / dyspnea, Disp: 1 Inhaler, Rfl: 4     atorvastatin (LIPITOR) 10 MG tablet,  TAKE ONE TABLET BY MOUTH ONE TIME DAILY, Disp: 90 tablet, Rfl: 1     azithromycin (ZITHROMAX) 250 MG tablet, Two tablets first day, then one tablet daily for four days., Disp: 6 tablet, Rfl: 0     cetirizine (ZYRTEC) 10 MG tablet, Take 10 mg by mouth daily, Disp: , Rfl:      Cholecalciferol (VITAMIN D) 2000 UNITS tablet, Take 2,000 Units by mouth daily, Disp: 100 tablet, Rfl: 3     fluticasone (FLONASE) 50 MCG/ACT nasal spray, USE TWO SPRAYS IN EACH NOSTRIL DAILY, Disp: 16 mL, Rfl: 0     ipratropium - albuterol 0.5 mg/2.5 mg/3 mL (DUONEB) 0.5-2.5 (3) MG/3ML neb solution, Take 1 vial (3 mLs) by nebulization every 6 hours as needed for shortness of breath / dyspnea or wheezing, Disp: 30 vial, Rfl: 1     Ipratropium-Albuterol (COMBIVENT RESPIMAT)  MCG/ACT inhaler, Inhale 1 puff into the lungs 4 times daily Not to exceed 6 doses per day., Disp: 1 Inhaler, Rfl: 1     omeprazole (PRILOSEC) 20 MG CR capsule, Take 1 capsule (20 mg) by mouth every morning (before breakfast), Disp: 90 capsule, Rfl: 3     predniSONE (DELTASONE) 20 MG tablet, Take 3 tabs (60 mg) by mouth daily x 3 days, 2 tabs (40 mg) daily x 3 days, 1 tab (20 mg) daily x 3 days, then 1/2 tab (10 mg) x 3 days., Disp: 20 tablet, Rfl: 0     vitamin  B complex with vitamin C (VITAMIN  B COMPLEX) TABS, Take 1 tablet by mouth daily, Disp: , Rfl:     Allergies:    No clinical screening - see comments    Physical Examination:    The patient is a well developed, well nourished male in no apparent distress.  He is normocephalic, atraumatic with pupils equally round and reactive to light.    Oral Cavity Examination:  Normal mucosa with no masses or lesions  Nasal Examination:  Extremely engorged nasal turbinates and a deviated septum.  There are no masses or lesions in either nostril and no discharge or infection.  Ear Examination: Ear canals clear, tympanic membranes and middle ear spaces normal  Neurological Examination: Facial nerve function intact and  symmetric  Integumentary Examination: No lesions on the skin of the head and neck  Neck Examination: No masses or lesions, no lymphadenopathy  Endocrine Examination: Normal thyroid examination    Assessment and Plan:    The patient presents with a history of nasal obstruction and a lack of the sense of smell for the past four months. The patient will be treated with Astelin Nasal Spray, Claritin and Singulair. He will be seen again in four weeks to assess his response to therapy.       PreOp Diagnosis:  Anosmia and Nasal Congestion    PostOp Diagnosis: Anosmia and Nasal Congestion    Procedure: Flexible Fiberoptic Laryngoscopy    Estimated Blood Loss: None    Complications: None    Consent: The patient was informed of the possible complications and probable outcomes of the procedure and the patient gave informed consent.    Fluids: None    Drains: None    Disposition: to home    Findings: Normal nasopharynx, base of tongue, pyriform sinuses, epiglottis, valleculae, false vocal cords, true vocal cords, and larynx.  Normal motion of the vocal cords with no lesions, masses, nodules, or polyps bilaterally.     Description of Procedure:    The patient was positioned on the clinic room chair. The nose was decongested and anesthetized with 2 puffs in each nostrils lidocaine hcl 4% and oxymetazoline hcl 0.05% topical solution. The flexible fiberoptic scope was passed into the each nostrils and the nasal passages visualized. The turbinates and nasal mucosa are severe severely enlarged and the septum deviated to the left. The scope was passed through the left nostril into the nasopharynx which was normal. The based of tongue and tonsil tissues were visualized and found to be normal. The vocal cords and larynx were visualized and the true vocal cords were visualized and found to be normal.     Dion Patrick MD    CC: Dr. Viral Rivera

## 2019-03-15 NOTE — PROGRESS NOTES
The patient presents with a history of nasal obstruction and a lack of the sense of smell for the past four months.  The patient is having difficulty breathing through the nostrils.  The patient denies difficulty with sinus infections or facial pain, but he reports chronic rhinitis. He has recently been treated for asthma.  The patient denies ear infections.  The patient denies chronic or recurrent tonsillitis, chronic or recurrent pharyngitis. The patient denies otalgia, otorrhea, eustachian tube dysfunction, ear infections, dizziness or tinnitus.     This patient is seen in consultation at the request of Dr. Viral Rivera.    All other systems were reviewed and they are either negative or they are not directly pertinent to this Otolaryngology examination.      Past Medical History:    Past Medical History:   Diagnosis Date     Allergic rhinitis      Anxiety      Gastroesophageal reflux disease      Hypercholesteremia      Intermittent asthma 12/20/2013     Obstructive sleep apnea      URBANO (obstructive sleep apnea) 12/19/2014       Past Surgical History:    Past Surgical History:   Procedure Laterality Date     ADENOIDECTOMY       ARTHROSCOPIC RECONSTRUCTION ANTERIOR CRUCIATE LIGAMENT  1997    Open, Knee Left     ARTHROSCOPY KNEE RT/LT  4/05    Left     GENITOURINARY SURGERY      Vasectomy     TONSILLECTOMY       TONSILLECTOMY & ADENOIDECTOMY  5 yo       Medications:      Current Outpatient Medications:      albuterol (PROAIR HFA/PROVENTIL HFA/VENTOLIN HFA) 108 (90 Base) MCG/ACT inhaler, Inhale 2 puffs into the lungs every 6 hours as needed for shortness of breath / dyspnea, Disp: 1 Inhaler, Rfl: 4     atorvastatin (LIPITOR) 10 MG tablet, TAKE ONE TABLET BY MOUTH ONE TIME DAILY, Disp: 90 tablet, Rfl: 1     azithromycin (ZITHROMAX) 250 MG tablet, Two tablets first day, then one tablet daily for four days., Disp: 6 tablet, Rfl: 0     cetirizine (ZYRTEC) 10 MG tablet, Take 10 mg by mouth daily, Disp: , Rfl:       Cholecalciferol (VITAMIN D) 2000 UNITS tablet, Take 2,000 Units by mouth daily, Disp: 100 tablet, Rfl: 3     fluticasone (FLONASE) 50 MCG/ACT nasal spray, USE TWO SPRAYS IN EACH NOSTRIL DAILY, Disp: 16 mL, Rfl: 0     ipratropium - albuterol 0.5 mg/2.5 mg/3 mL (DUONEB) 0.5-2.5 (3) MG/3ML neb solution, Take 1 vial (3 mLs) by nebulization every 6 hours as needed for shortness of breath / dyspnea or wheezing, Disp: 30 vial, Rfl: 1     Ipratropium-Albuterol (COMBIVENT RESPIMAT)  MCG/ACT inhaler, Inhale 1 puff into the lungs 4 times daily Not to exceed 6 doses per day., Disp: 1 Inhaler, Rfl: 1     omeprazole (PRILOSEC) 20 MG CR capsule, Take 1 capsule (20 mg) by mouth every morning (before breakfast), Disp: 90 capsule, Rfl: 3     predniSONE (DELTASONE) 20 MG tablet, Take 3 tabs (60 mg) by mouth daily x 3 days, 2 tabs (40 mg) daily x 3 days, 1 tab (20 mg) daily x 3 days, then 1/2 tab (10 mg) x 3 days., Disp: 20 tablet, Rfl: 0     vitamin  B complex with vitamin C (VITAMIN  B COMPLEX) TABS, Take 1 tablet by mouth daily, Disp: , Rfl:     Allergies:    No clinical screening - see comments    Physical Examination:    The patient is a well developed, well nourished male in no apparent distress.  He is normocephalic, atraumatic with pupils equally round and reactive to light.    Oral Cavity Examination:  Normal mucosa with no masses or lesions  Nasal Examination:  Extremely engorged nasal turbinates and a deviated septum.  There are no masses or lesions in either nostril and no discharge or infection.  Ear Examination: Ear canals clear, tympanic membranes and middle ear spaces normal  Neurological Examination: Facial nerve function intact and symmetric  Integumentary Examination: No lesions on the skin of the head and neck  Neck Examination: No masses or lesions, no lymphadenopathy  Endocrine Examination: Normal thyroid examination    Assessment and Plan:    The patient presents with a history of nasal obstruction and a  lack of the sense of smell for the past four months. The patient will be treated with Astelin Nasal Spray, Claritin and Singulair. He will be seen again in four weeks to assess his response to therapy.       PreOp Diagnosis:  Anosmia and Nasal Congestion    PostOp Diagnosis: Anosmia and Nasal Congestion    Procedure: Flexible Fiberoptic Laryngoscopy    Estimated Blood Loss: None    Complications: None    Consent: The patient was informed of the possible complications and probable outcomes of the procedure and the patient gave informed consent.    Fluids: None    Drains: None    Disposition: to home    Findings: Normal nasopharynx, base of tongue, pyriform sinuses, epiglottis, valleculae, false vocal cords, true vocal cords, and larynx.  Normal motion of the vocal cords with no lesions, masses, nodules, or polyps bilaterally.     Description of Procedure:    The patient was positioned on the clinic room chair. The nose was decongested and anesthetized with 2 puffs in each nostrils lidocaine hcl 4% and oxymetazoline hcl 0.05% topical solution. The flexible fiberoptic scope was passed into the each nostrils and the nasal passages visualized. The turbinates and nasal mucosa are severe severely enlarged and the septum deviated to the left. The scope was passed through the left nostril into the nasopharynx which was normal. The based of tongue and tonsil tissues were visualized and found to be normal. The vocal cords and larynx were visualized and the true vocal cords were visualized and found to be normal.       CC: Dr. Viral Rivera

## 2019-03-15 NOTE — PATIENT INSTRUCTIONS
1.  You were seen in the ENT Clinic today by Dr. Patrick.  If you have any questions or concerns after your appointment, please call [phone]. Press option #1 for scheduling related needs. Press option #3 for Nurse advice.    2.  Plan is to return to clinic in 1 month.      Leila Downing LPN  [facility] Otolaryngology  [phone]    The patient presents with a history of nasal obstruction and a lack of the sense of smell for the past four months. The patient will be treated with Astelin Nasal Spray, Claritin and Singulair. He will be seen again in four weeks to assess his response to therapy.

## 2019-04-25 ENCOUNTER — OFFICE VISIT (OUTPATIENT)
Dept: OTOLARYNGOLOGY | Facility: CLINIC | Age: 49
End: 2019-04-25
Payer: COMMERCIAL

## 2019-04-25 ENCOUNTER — ANCILLARY PROCEDURE (OUTPATIENT)
Dept: CT IMAGING | Facility: CLINIC | Age: 49
End: 2019-04-25
Attending: OTOLARYNGOLOGY
Payer: COMMERCIAL

## 2019-04-25 VITALS — TEMPERATURE: 97.8 F | HEART RATE: 73 BPM | SYSTOLIC BLOOD PRESSURE: 119 MMHG | DIASTOLIC BLOOD PRESSURE: 76 MMHG

## 2019-04-25 DIAGNOSIS — R43.0 ANOSMIA: ICD-10-CM

## 2019-04-25 DIAGNOSIS — J30.9 ALLERGIC RHINITIS, UNSPECIFIED SEASONALITY, UNSPECIFIED TRIGGER: ICD-10-CM

## 2019-04-25 DIAGNOSIS — J34.89 NASAL OBSTRUCTION: ICD-10-CM

## 2019-04-25 DIAGNOSIS — J01.01 ACUTE RECURRENT MAXILLARY SINUSITIS: ICD-10-CM

## 2019-04-25 DIAGNOSIS — J30.9 ALLERGIC RHINITIS, UNSPECIFIED SEASONALITY, UNSPECIFIED TRIGGER: Primary | ICD-10-CM

## 2019-04-25 ASSESSMENT — PAIN SCALES - GENERAL: PAINLEVEL: NO PAIN (0)

## 2019-04-25 NOTE — NURSING NOTE
Chief Complaint   Patient presents with     RECHECK     nasal obstruction, anosmia . unable to obtain heightand weight. scale occupied.      Blood pressure 119/76, pulse 73, temperature 97.8  F (36.6  C).    Jurgen Banks LPN

## 2019-04-25 NOTE — PATIENT INSTRUCTIONS
1.  You were seen in the ENT Clinic today by Dr. Patrick.  If you have any questions or concerns after your appointment, please call [phone]. Press option #1 for scheduling related needs. Press option #3 for Nurse advice.    2.  Please schedule an appointment for the following:   - CT Scan - Sinuses   - Dr. Sauer - Allergy Consult    3.  Plan is to return to clinic to see Dr. Willi Garcia for further evaluation and CT scan review.      Leila Downing LPN  [facility] Otolaryngology  [phone]    The patient presents with a history of nasal obstruction and a lack of the sense of smell for the past four months. The patient was treated with Astelin Nasal Spray, Claritin and Singulair. He did not improve with medical therapy. He will be referred for a CT scan of the sinuses and a consultation with allergist Dr. Jeronimo Sauer. He will also be referred to Dr. Willi Garcia for consideration of surgical options of therapy.

## 2019-04-25 NOTE — PROGRESS NOTES
The patient presents with a history of nasal obstruction and a lack of the sense of smell for the past four months.  The patient is having difficulty breathing through the nostrils.  The patient denies difficulty with sinus infections or facial pain, but he reports chronic rhinitis. He has recently been treated for asthma.  The patient reports no improvement of his symptoms with medical therapy over the past month. He has been using Afrin Nasal Spray to improve breathing at night.       All other systems were reviewed and they are either negative or they are not directly pertinent to this Otolaryngology examination.      Past Medical History:    Past Medical History:   Diagnosis Date     Allergic rhinitis      Anxiety      Gastroesophageal reflux disease      Hypercholesteremia      Intermittent asthma 12/20/2013     Obstructive sleep apnea      URBANO (obstructive sleep apnea) 12/19/2014       Past Surgical History:    Past Surgical History:   Procedure Laterality Date     ADENOIDECTOMY       ARTHROSCOPIC RECONSTRUCTION ANTERIOR CRUCIATE LIGAMENT  1997    Open, Knee Left     ARTHROSCOPY KNEE RT/LT  4/05    Left     GENITOURINARY SURGERY      Vasectomy     TONSILLECTOMY       TONSILLECTOMY & ADENOIDECTOMY  5 yo       Medications:      Current Outpatient Medications:      albuterol (PROAIR HFA/PROVENTIL HFA/VENTOLIN HFA) 108 (90 Base) MCG/ACT inhaler, Inhale 2 puffs into the lungs every 6 hours as needed for shortness of breath / dyspnea, Disp: 1 Inhaler, Rfl: 4     atorvastatin (LIPITOR) 10 MG tablet, TAKE ONE TABLET BY MOUTH ONE TIME DAILY, Disp: 90 tablet, Rfl: 1     azelastine (ASTELIN) 0.1 % nasal spray, Spray 1 spray into both nostrils 2 times daily, Disp: 30 mL, Rfl: 2     azithromycin (ZITHROMAX) 250 MG tablet, Two tablets first day, then one tablet daily for four days., Disp: 6 tablet, Rfl: 0     cetirizine (ZYRTEC) 10 MG tablet, Take 10 mg by mouth daily, Disp: , Rfl:      Cholecalciferol (VITAMIN D) 2000  UNITS tablet, Take 2,000 Units by mouth daily, Disp: 100 tablet, Rfl: 3     fluticasone (FLONASE) 50 MCG/ACT nasal spray, USE TWO SPRAYS IN EACH NOSTRIL DAILY, Disp: 16 mL, Rfl: 0     ipratropium - albuterol 0.5 mg/2.5 mg/3 mL (DUONEB) 0.5-2.5 (3) MG/3ML neb solution, Take 1 vial (3 mLs) by nebulization every 6 hours as needed for shortness of breath / dyspnea or wheezing, Disp: 30 vial, Rfl: 1     Ipratropium-Albuterol (COMBIVENT RESPIMAT)  MCG/ACT inhaler, Inhale 1 puff into the lungs 4 times daily Not to exceed 6 doses per day., Disp: 1 Inhaler, Rfl: 1     loratadine (CLARITIN) 10 MG capsule, Take 10 mg by mouth daily, Disp: 60 capsule, Rfl: 3     montelukast (SINGULAIR) 10 MG tablet, Take 1 tablet (10 mg) by mouth At Bedtime, Disp: 90 tablet, Rfl: 1     omeprazole (PRILOSEC) 20 MG CR capsule, Take 1 capsule (20 mg) by mouth every morning (before breakfast), Disp: 90 capsule, Rfl: 3     predniSONE (DELTASONE) 20 MG tablet, Take 3 tabs (60 mg) by mouth daily x 3 days, 2 tabs (40 mg) daily x 3 days, 1 tab (20 mg) daily x 3 days, then 1/2 tab (10 mg) x 3 days., Disp: 20 tablet, Rfl: 0     vitamin  B complex with vitamin C (VITAMIN  B COMPLEX) TABS, Take 1 tablet by mouth daily, Disp: , Rfl:     Allergies:    No clinical screening - see comments    Physical Examination:    The patient is a well developed, well nourished male in no apparent distress.  He is normocephalic, atraumatic with pupils equally round and reactive to light.    Oral Cavity Examination:  Normal mucosa with no masses or lesions  Nasal Examination:  Engorged nasal turbinates and a deviated septum.  There are no masses or lesions in either nostril and no discharge or infection.  Neurological Examination: Facial nerve function intact and symmetric  Integumentary Examination: No lesions on the skin of the head and neck    Assessment and Plan:    The patient presents with a history of nasal obstruction and a lack of the sense of smell for the  past four months. The patient was treated with Astelin Nasal Spray, Claritin and Singulair. He did not improve with medical therapy. He will be referred for a CT scan of the sinuses and a consultation with allergist Dr. Jeronimo Sauer. He will also be referred to Dr. Willi Garcia for consideration of surgical options of therapy.     CC: Dr. Viral Rivera

## 2019-04-25 NOTE — LETTER
4/25/2019       RE: Dion Thomson  [address] [facility] [address]     Dear Colleague,    Thank you for referring your patient, Dion Thomson, to the [facility] EAR NOSE AND THROAT at [facility]. Please see a copy of my visit note below.    The patient presents with a history of nasal obstruction and a lack of the sense of smell for the past four months.  The patient is having difficulty breathing through the nostrils.  The patient denies difficulty with sinus infections or facial pain, but he reports chronic rhinitis. He has recently been treated for asthma.  The patient reports no improvement of his symptoms with medical therapy over the past month. He has been using Afrin Nasal Spray to improve breathing at night.       All other systems were reviewed and they are either negative or they are not directly pertinent to this Otolaryngology examination.      Past Medical History:    Past Medical History:   Diagnosis Date     Allergic rhinitis      Anxiety      Gastroesophageal reflux disease      Hypercholesteremia      Intermittent asthma 12/20/2013     Obstructive sleep apnea      URBANO (obstructive sleep apnea) 12/19/2014       Past Surgical History:    Past Surgical History:   Procedure Laterality Date     ADENOIDECTOMY       ARTHROSCOPIC RECONSTRUCTION ANTERIOR CRUCIATE LIGAMENT  1997    Open, Knee Left     ARTHROSCOPY KNEE RT/LT  4/05    Left     GENITOURINARY SURGERY      Vasectomy     TONSILLECTOMY       TONSILLECTOMY & ADENOIDECTOMY  5 yo       Medications:      Current Outpatient Medications:      albuterol (PROAIR HFA/PROVENTIL HFA/VENTOLIN HFA) 108 (90 Base) MCG/ACT inhaler, Inhale 2 puffs into the lungs every 6 hours as needed for shortness of breath / dyspnea, Disp: 1 Inhaler, Rfl: 4     atorvastatin (LIPITOR) 10 MG tablet, TAKE ONE TABLET BY MOUTH ONE TIME DAILY, Disp: 90 tablet, Rfl: 1     azelastine (ASTELIN) 0.1 % nasal spray, Spray 1 spray into both nostrils 2  times daily, Disp: 30 mL, Rfl: 2     azithromycin (ZITHROMAX) 250 MG tablet, Two tablets first day, then one tablet daily for four days., Disp: 6 tablet, Rfl: 0     cetirizine (ZYRTEC) 10 MG tablet, Take 10 mg by mouth daily, Disp: , Rfl:      Cholecalciferol (VITAMIN D) 2000 UNITS tablet, Take 2,000 Units by mouth daily, Disp: 100 tablet, Rfl: 3     fluticasone (FLONASE) 50 MCG/ACT nasal spray, USE TWO SPRAYS IN EACH NOSTRIL DAILY, Disp: 16 mL, Rfl: 0     ipratropium - albuterol 0.5 mg/2.5 mg/3 mL (DUONEB) 0.5-2.5 (3) MG/3ML neb solution, Take 1 vial (3 mLs) by nebulization every 6 hours as needed for shortness of breath / dyspnea or wheezing, Disp: 30 vial, Rfl: 1     Ipratropium-Albuterol (COMBIVENT RESPIMAT)  MCG/ACT inhaler, Inhale 1 puff into the lungs 4 times daily Not to exceed 6 doses per day., Disp: 1 Inhaler, Rfl: 1     loratadine (CLARITIN) 10 MG capsule, Take 10 mg by mouth daily, Disp: 60 capsule, Rfl: 3     montelukast (SINGULAIR) 10 MG tablet, Take 1 tablet (10 mg) by mouth At Bedtime, Disp: 90 tablet, Rfl: 1     omeprazole (PRILOSEC) 20 MG CR capsule, Take 1 capsule (20 mg) by mouth every morning (before breakfast), Disp: 90 capsule, Rfl: 3     predniSONE (DELTASONE) 20 MG tablet, Take 3 tabs (60 mg) by mouth daily x 3 days, 2 tabs (40 mg) daily x 3 days, 1 tab (20 mg) daily x 3 days, then 1/2 tab (10 mg) x 3 days., Disp: 20 tablet, Rfl: 0     vitamin  B complex with vitamin C (VITAMIN  B COMPLEX) TABS, Take 1 tablet by mouth daily, Disp: , Rfl:     Allergies:    No clinical screening - see comments    Physical Examination:    The patient is a well developed, well nourished male in no apparent distress.  He is normocephalic, atraumatic with pupils equally round and reactive to light.    Oral Cavity Examination:  Normal mucosa with no masses or lesions  Nasal Examination:  Engorged nasal turbinates and a deviated septum.  There are no masses or lesions in either nostril and no discharge or  infection.  Neurological Examination: Facial nerve function intact and symmetric  Integumentary Examination: No lesions on the skin of the head and neck    Assessment and Plan:    The patient presents with a history of nasal obstruction and a lack of the sense of smell for the past four months. The patient was treated with Astelin Nasal Spray, Claritin and Singulair. He did not improve with medical therapy. He will be referred for a CT scan of the sinuses and a consultation with allergist Dr. Jeronimo Sauer. He will also be referred to Dr. Willi Garcia for consideration of surgical options of therapy.     CC: Dr. Viral Rivera    Again, thank you for allowing me to participate in the care of your patient.      Sincerely,    Dion Patrick MD

## 2019-04-29 ENCOUNTER — TELEPHONE (OUTPATIENT)
Dept: OTOLARYNGOLOGY | Facility: CLINIC | Age: 49
End: 2019-04-29

## 2019-04-29 NOTE — TELEPHONE ENCOUNTER
FUTURE VISIT INFORMATION      FUTURE VISIT INFORMATION:    Date: 6/24/19    Time: 8:30 am    [place] [facility]  REFERRAL INFORMATION:    Referring provider:  Dr. Dion Patrick    Referring providers clinic:  [facility]    Reason for visit/diagnosis  Nasal Obstruction/Sinus issue    RECORDS REQUESTED FROM:       Clinic name Comments Records Status Imaging Status   [facility] Office Visit-4/25/19, 3/15/19-Dr. Patrick [facility] CT Sinus-4/25/19 [facility] PACS

## 2019-04-29 NOTE — TELEPHONE ENCOUNTER
Left message for pt informing him that CT scan showed sinusitis and Dr. Patrick would like pt to start taking amoxicillin-clavulanate (AUGMENTIN) 875-125 MG tablet - Take 1 tablet by mouth 2 times daily for 10 days. Informed pt that Rx was sent to the [facility] in [facility] in [place], [place]. Provided direct contact information for pt to call back w/ any questions or concerns.      Leila Downing LPN

## 2019-05-09 DIAGNOSIS — J45.909 ASTHMA: Primary | ICD-10-CM

## 2019-05-09 NOTE — TELEPHONE ENCOUNTER
RECORDS RECEIVED FROM: Internal   DATE RECEIVED: 6.3.19   NOTES STATUS DETAILS   OFFICE NOTE from referring provider Internal 4.25.19 Dr. Patrick  3.15.19   OFFICE NOTE from other specialist Internal 1.18.19 Dr. Rivrea   DISCHARGE SUMMARY from hospital N/A    DISCHARGE REPORT from the ER N/A    MEDICATION LIST Internal    IMAGING  (NEED IMAGES AND REPORTS)     CT SCAN Internal    CHEST XRAY (CXR) Internal    TESTS     PULMONARY FUNCTION TESTING (PFT) Internal 12.17.18 11.20.18      Action    Action Taken 5.9.19 Sent message to clinical staff to place pft order. Pt has history of Asthma.

## 2019-06-02 ASSESSMENT — ENCOUNTER SYMPTOMS
DYSPNEA ON EXERTION: 1
SNORES LOUDLY: 0
COUGH DISTURBING SLEEP: 0
HOARSE VOICE: 1
SINUS PAIN: 0
SINUS CONGESTION: 1
SHORTNESS OF BREATH: 0
POSTURAL DYSPNEA: 0
SPUTUM PRODUCTION: 1
SMELL DISTURBANCE: 1
NECK MASS: 0
HEMOPTYSIS: 0
TASTE DISTURBANCE: 1
WHEEZING: 0
TROUBLE SWALLOWING: 0
COUGH: 1
SORE THROAT: 0

## 2019-06-03 ENCOUNTER — PRE VISIT (OUTPATIENT)
Dept: PULMONOLOGY | Facility: CLINIC | Age: 49
End: 2019-06-03

## 2019-06-03 ENCOUNTER — OFFICE VISIT (OUTPATIENT)
Dept: PULMONOLOGY | Facility: CLINIC | Age: 49
End: 2019-06-03
Attending: ALLERGY & IMMUNOLOGY
Payer: COMMERCIAL

## 2019-06-03 VITALS
SYSTOLIC BLOOD PRESSURE: 116 MMHG | WEIGHT: 220 LBS | RESPIRATION RATE: 16 BRPM | BODY MASS INDEX: 32.58 KG/M2 | OXYGEN SATURATION: 97 % | HEART RATE: 71 BPM | DIASTOLIC BLOOD PRESSURE: 73 MMHG | HEIGHT: 69 IN

## 2019-06-03 DIAGNOSIS — J30.89 ALLERGIC RHINITIS DUE TO AMERICAN HOUSE DUST MITE: ICD-10-CM

## 2019-06-03 DIAGNOSIS — J30.9 ALLERGIC RHINITIS, UNSPECIFIED SEASONALITY, UNSPECIFIED TRIGGER: ICD-10-CM

## 2019-06-03 DIAGNOSIS — J45.909 ASTHMA: ICD-10-CM

## 2019-06-03 DIAGNOSIS — R43.0 ANOSMIA: ICD-10-CM

## 2019-06-03 DIAGNOSIS — J30.81 ALLERGIC RHINITIS DUE TO ANIMAL DANDER: ICD-10-CM

## 2019-06-03 DIAGNOSIS — J30.1 SEASONAL ALLERGIC RHINITIS DUE TO POLLEN: Primary | ICD-10-CM

## 2019-06-03 DIAGNOSIS — J34.89 NASAL OBSTRUCTION: ICD-10-CM

## 2019-06-03 PROCEDURE — 95004 PERQ TESTS W/ALRGNC XTRCS: CPT | Mod: ZF | Performed by: ALLERGY & IMMUNOLOGY

## 2019-06-03 PROCEDURE — G0463 HOSPITAL OUTPT CLINIC VISIT: HCPCS | Mod: 25,ZF

## 2019-06-03 RX ORDER — BUDESONIDE AND FORMOTEROL FUMARATE DIHYDRATE 160; 4.5 UG/1; UG/1
2 AEROSOL RESPIRATORY (INHALATION) 2 TIMES DAILY
COMMUNITY
Start: 2019-05-25 | End: 2023-08-16 | Stop reason: ALTCHOICE

## 2019-06-03 RX ORDER — AZELASTINE HYDROCHLORIDE, FLUTICASONE PROPIONATE 137; 50 UG/1; UG/1
1 SPRAY, METERED NASAL 2 TIMES DAILY
Qty: 1 BOTTLE | Refills: 5 | Status: SHIPPED | OUTPATIENT
Start: 2019-06-03 | End: 2019-08-12

## 2019-06-03 ASSESSMENT — PAIN SCALES - GENERAL: PAINLEVEL: NO PAIN (0)

## 2019-06-03 ASSESSMENT — MIFFLIN-ST. JEOR: SCORE: 1868.54

## 2019-06-03 NOTE — LETTER
6/3/2019      RE: Dion Thomson  [address] [place] [address] [address]  [facility] [id number]       Reason for Visit  Dion Thomson is a 48 year old male who is referred by Viral Rivera for rhinorrhea, nasal congestion and difficulty with inspiration.  He first noticed these symptoms approximately 1 year ago.  He has clear rhinorrhea almost daily clearance.  He has frequent throat clearing.  No conjunctivitis or itchy eyes.  No ear pain or fullness.  No sinus pressure or pain.  No frequent or persist cough.  No change in coloration of urine. He has a waxing and waning course associated with these symptoms.  He does not notice any worsening with symptoms in outdoor setting.  He rarely has productive sputum, green in coloration.  He has a personal history of mild intermittent asthma.  He was evaluated by Dr. Lang at [facility].  He lost his sense of taste and smell around this time.  He does Symbicort 2 puffs twice daily with improvement in his symptoms.  He also had a trial of duoneb with minimal effect.  He has had several rounds of antibiotics throughout the past year 10-14 days in duration.  He had 2-3 courses of prednisone for his symptoms wih improvement in smell and taste.  Hx of seasonal and hay allergies as well as cats.  He has two cats, a rabbit, and dog at home.  He exchanges his HVAC filter regularly.   No visualized mold at home.  He works at licensed psycho therapist.  No chemical or fume exposure.  No excessive gardening or spelunking.  He spends a fair amount of time in the basement and recalls being told higher level of radon (lived in home for 5 years).  He current daily regimen involves Symbicort, PRN albuterol/duoneb, and flonase.    His daughter has EDS, POTS, and possible mast cell activation.  No autoimmune disease in the family.  He grew up in a household of smokers.  He has no personal history of smoking.      Allergy HPI      The patient was seen and examined by Jeronimo Queen MD   Current  Outpatient Medications   Medication     albuterol (PROAIR HFA/PROVENTIL HFA/VENTOLIN HFA) 108 (90 Base) MCG/ACT inhaler     atorvastatin (LIPITOR) 10 MG tablet     Cholecalciferol (VITAMIN D) 2000 UNITS tablet     omeprazole (PRILOSEC) 20 MG CR capsule     SYMBICORT 160-4.5 MCG/ACT Inhaler     vitamin  B complex with vitamin C (VITAMIN  B COMPLEX) TABS     azelastine (ASTELIN) 0.1 % nasal spray     azithromycin (ZITHROMAX) 250 MG tablet     cetirizine (ZYRTEC) 10 MG tablet     fluticasone (FLONASE) 50 MCG/ACT nasal spray     ipratropium - albuterol 0.5 mg/2.5 mg/3 mL (DUONEB) 0.5-2.5 (3) MG/3ML neb solution     Ipratropium-Albuterol (COMBIVENT RESPIMAT)  MCG/ACT inhaler     montelukast (SINGULAIR) 10 MG tablet     predniSONE (DELTASONE) 20 MG tablet     No current facility-administered medications for this visit.      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     Employer: SELF   Social Needs     Financial resource strain: Not on file     Food insecurity:     Worry: Not on file     Inability: Not on file     Transportation needs:     Medical: Not on file     Non-medical: Not on file   Tobacco Use     Smoking status: Never Smoker     Smokeless tobacco: Never Used   Substance and Sexual Activity     Alcohol use: Yes     Alcohol/week: 0.0 - 0.5 oz     Comment: Occasional     Drug use: No     Sexual activity: Yes     Partners: Female     Birth control/protection: Pill, [employer]   Lifestyle     Physical activity:     Days per week: Not on file     Minutes per session: Not on file     Stress: Not on file   Relationships     Social connections:     Talks on phone: Not on file  "    Gets together: Not on file     Attends Baptism service: Not on file     Active member of club or organization: Not on file     Attends meetings of clubs or organizations: Not on file     Relationship status: Not on file     Intimate partner violence:     Fear of current or ex partner: Not on file     Emotionally abused: Not on file     Physically abused: Not on file     Forced sexual activity: Not on file   Other Topics Concern     Parent/sibling w/ CABG, MI or angioplasty before 65F 55M? Not Asked   Social History Narrative     Not on file     Past Medical History:   Diagnosis Date     Allergic rhinitis      Anxiety      Gastroesophageal reflux disease      Hypercholesteremia      Intermittent asthma 2013     Obstructive sleep apnea      URBANO (obstructive sleep apnea) 2014     Past Surgical History:   Procedure Laterality Date     ADENOIDECTOMY       ARTHROSCOPIC RECONSTRUCTION ANTERIOR CRUCIATE LIGAMENT      Open, Knee Left     ARTHROSCOPY KNEE RT/LT      Left     GENITOURINARY SURGERY      Vasectomy     TONSILLECTOMY       TONSILLECTOMY & ADENOIDECTOMY  5 yo     Family History   Problem Relation Age of Onset     Coronary Artery Disease Father      Diabetes Mother          since      Depression Mother         Bipolar disorder     Cancer Other         multiple, both sides     Heart Disease Other      Diabetes Other         Great aunt     Diabetes Maternal Grandmother          ROS   A complete ROS was otherwise negative except as noted in the HPI and the end of the note.  /73 (BP Location: Right arm, Patient Position: Chair, Cuff Size: Adult Large)   Pulse 71   Resp 16   Ht 1.753 m (5' 9.02\")   Wt 99.8 kg (220 lb)   SpO2 97%   BMI 32.47 kg/m     Exam:   GENERAL APPEARANCE: Well developed, well nourished, alert, and in no apparent distress.  EYES: PERRL, EOMI, conjunctiva clear non-injected  HENT: Nasal mucosa with mild erythema and boggy turbinates. No nasal polyps.  " No facial tenderness. Did have thick discharge that he blew his nose and it was removed.  EARS: Canals clear, TMs normal  MOUTH: Oral mucosa is moist, without any lesions, no tonsillar enlargement, no oropharyngeal exudate.  NECK: Supple, no masses, no thyromegaly.  LYMPHATICS: No significant cervical, or supraclavicular nodes.  RESP: Good air flow throughout.  No crackles. No rhonchi. No wheezes.  CV: Normal S1, S2, regular rhythm, normal rate. No murmur.  No rub. No gallop. No LE edema.   MS: Extremities normal. No clubbing. No cyanosis.  SKIN: No rashes noted  NEURO: Speech normal, normal strength and tone, normal gait and stance  PSYCH: Normal mentation, orientation to person, place, and time.  Results:  44 percutaneous environmental allergen (and 2 controls) extracts placed by MA and read by MD.  Positive to dust mites, cat, dog, rabbit, tree, grass and weed pollen.      Assessment and plan:   49 yo male with chronic seasonal allergic rhinitis resulting in recurrent bacterial rhinosinusitis.  Based on history, physical examination and skin testing performed in office, he has significant allergies to cat, dog, tree pollen, grass pollen, and weed pollen.  We discussed in detail his recurrent symptoms are a result of daily exposure to his pets at home and recommended if possible removal.  If this is not a possibility, recommendation to limit any time pets spend in bedroom as well as interactions with these animals.  For symptom control, he is to start dymista nasal spray 1 spray in each nostril twice a day.  If this is too expensive, we will use fluticasone (flonase) and azelastine 1 spray in each nostril twice a day.  He will also start high intensity ceterizine 10 mg (zyrtec) twice a day.  If getting tired on this regimen, change the morning dose to fexofenadine (Allegra) 180 mg.   For long term control, he will consider allergy shots.  He will follow up in the 6 months or sooner if symptoms not well controlled  and/or desires starting allergy immunotherapy.    The patient was seen and discussed with Dr. Sauer, the attending, who agrees with the plan as stated above.    Kleber Tirado MD  IM/PEDS, PGY4      Physician Attestation   I, Jeronimo Sauer, saw this patient with the resident and agree with the resident/fellow's findings and plan of care as documented in the note.          Jeronimo Sauer MD  Date of Service (when I saw the patient): 06/03/19    Answers for HPI/ROS submitted by the patient on 6/2/2019   General Symptoms: No  Skin Symptoms: No  HENT Symptoms: Yes  EYE SYMPTOMS: No  HEART SYMPTOMS: No  LUNG SYMPTOMS: Yes  INTESTINAL SYMPTOMS: No  URINARY SYMPTOMS: No  REPRODUCTIVE SYMPTOMS: No  SKELETAL SYMPTOMS: No  BLOOD SYMPTOMS: No  NERVOUS SYSTEM SYMPTOMS: No  MENTAL HEALTH SYMPTOMS: No  Ear pain: No  Ear discharge: No  Hearing loss: No  Tinnitus: No  Nosebleeds: No  Congestion: Yes  Sinus pain: No  Trouble swallowing: No   Voice hoarseness: Yes  Mouth sores: No  Sore throat: No  Tooth pain: No  Gum tenderness: No  Bleeding gums: No  Change in taste: Yes  Change in sense of smell: Yes  Dry mouth: No  Hearing aid used: No  Neck lump: No  Cough: Yes  Sputum or phlegm: Yes  Coughing up blood: No  Difficulty breating or shortness of breath: No  Snoring: No  Wheezing: No  Difficulty breathing on exertion: Yes  Nighttime Cough: No  Difficulty breathing when lying flat: No      Jeronimo Sauer MD

## 2019-06-03 NOTE — LETTER
6/3/2019       RE: Dion Thomson  [address] [facility] [address]     Dear Colleague,    Thank you for referring your patient, Dion Thomson, to the [facility] at [facility]. Please see a copy of my visit note below.    Reason for Visit  Dion Thomson is a 48 year old male who is referred by Viral Rivera for rhinorrhea, nasal congestion and difficulty with inspiration.  He first noticed these symptoms approximately 1 year ago.  He has clear rhinorrhea almost daily clearance.  He has frequent throat clearing.  No conjunctivitis or itchy eyes.  No ear pain or fullness.  No sinus pressure or pain.  No frequent or persist cough.  No change in coloration of urine. He has a waxing and waning course associated with these symptoms.  He does not notice any worsening with symptoms in outdoor setting.  He rarely has productive sputum, green in coloration.  He has a personal history of mild intermittent asthma.  He was evaluated by Dr. Lang at [facility].  He lost his sense of taste and smell around this time.  He does Symbicort 2 puffs twice daily with improvement in his symptoms.  He also had a trial of duoneb with minimal effect.  He has had several rounds of antibiotics throughout the past year 10-14 days in duration.  He had 2-3 courses of prednisone for his symptoms wih improvement in smell and taste.  Hx of seasonal and hay allergies as well as cats.  He has two cats, a rabbit, and dog at home.  He exchanges his HVAC filter regularly.   No visualized mold at home.  He works at licensed psycho therapist.  No chemical or fume exposure.  No excessive gardening or spelunking.  He spends a fair amount of time in the basement and recalls being told higher level of radon (lived in home for 5 years).  He current daily regimen involves Symbicort, PRN albuterol/duoneb, and flonase.    His daughter has EDS, POTS, and possible mast cell activation.   No autoimmune disease in the family.  He grew up in a household of smokers.  He has no personal history of smoking.      Allergy HPI      The patient was seen and examined by Jeronimo Queen MD   Current Outpatient Medications   Medication     albuterol (PROAIR HFA/PROVENTIL HFA/VENTOLIN HFA) 108 (90 Base) MCG/ACT inhaler     atorvastatin (LIPITOR) 10 MG tablet     Cholecalciferol (VITAMIN D) 2000 UNITS tablet     omeprazole (PRILOSEC) 20 MG CR capsule     SYMBICORT 160-4.5 MCG/ACT Inhaler     vitamin  B complex with vitamin C (VITAMIN  B COMPLEX) TABS     azelastine (ASTELIN) 0.1 % nasal spray     azithromycin (ZITHROMAX) 250 MG tablet     cetirizine (ZYRTEC) 10 MG tablet     fluticasone (FLONASE) 50 MCG/ACT nasal spray     ipratropium - albuterol 0.5 mg/2.5 mg/3 mL (DUONEB) 0.5-2.5 (3) MG/3ML neb solution     Ipratropium-Albuterol (COMBIVENT RESPIMAT)  MCG/ACT inhaler     montelukast (SINGULAIR) 10 MG tablet     predniSONE (DELTASONE) 20 MG tablet     No current facility-administered medications for this visit.      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     Employer: SELF   Social Needs     Financial resource strain: Not on file     Food insecurity:     Worry: Not on file     Inability: Not on file     Transportation needs:     Medical: Not on file     Non-medical: Not on file   Tobacco Use     Smoking status: Never Smoker     Smokeless tobacco: Never Used   Substance and Sexual Activity     Alcohol use: Yes     Alcohol/week: 0.0 - 0.5 oz     Comment: Occasional     Drug use: No     Sexual activity: Yes     Partners: Female     Birth control/protection: Pill,  "[employer]   Lifestyle     Physical activity:     Days per week: Not on file     Minutes per session: Not on file     Stress: Not on file   Relationships     Social connections:     Talks on phone: Not on file     Gets together: Not on file     Attends Church service: Not on file     Active member of club or organization: Not on file     Attends meetings of clubs or organizations: Not on file     Relationship status: Not on file     Intimate partner violence:     Fear of current or ex partner: Not on file     Emotionally abused: Not on file     Physically abused: Not on file     Forced sexual activity: Not on file   Other Topics Concern     Parent/sibling w/ CABG, MI or angioplasty before 65F 55M? Not Asked   Social History Narrative     Not on file     Past Medical History:   Diagnosis Date     Allergic rhinitis      Anxiety      Gastroesophageal reflux disease      Hypercholesteremia      Intermittent asthma 2013     Obstructive sleep apnea      URBANO (obstructive sleep apnea) 2014     Past Surgical History:   Procedure Laterality Date     ADENOIDECTOMY       ARTHROSCOPIC RECONSTRUCTION ANTERIOR CRUCIATE LIGAMENT      Open, Knee Left     ARTHROSCOPY KNEE RT/LT      Left     GENITOURINARY SURGERY      Vasectomy     TONSILLECTOMY       TONSILLECTOMY & ADENOIDECTOMY  5 yo     Family History   Problem Relation Age of Onset     Coronary Artery Disease Father      Diabetes Mother          since      Depression Mother         Bipolar disorder     Cancer Other         multiple, both sides     Heart Disease Other      Diabetes Other         Great aunt     Diabetes Maternal Grandmother          ROS   A complete ROS was otherwise negative except as noted in the HPI and the end of the note.  /73 (BP Location: Right arm, Patient Position: Chair, Cuff Size: Adult Large)   Pulse 71   Resp 16   Ht 1.753 m (5' 9.02\")   Wt 99.8 kg (220 lb)   SpO2 97%   BMI 32.47 kg/m     Exam: "   GENERAL APPEARANCE: Well developed, well nourished, alert, and in no apparent distress.  EYES: PERRL, EOMI, conjunctiva clear non-injected  HENT: Nasal mucosa with mild erythema and boggy turbinates. No nasal polyps.  No facial tenderness. Did have thick discharge that he blew his nose and it was removed.  EARS: Canals clear, TMs normal  MOUTH: Oral mucosa is moist, without any lesions, no tonsillar enlargement, no oropharyngeal exudate.  NECK: Supple, no masses, no thyromegaly.  LYMPHATICS: No significant cervical, or supraclavicular nodes.  RESP: Good air flow throughout.  No crackles. No rhonchi. No wheezes.  CV: Normal S1, S2, regular rhythm, normal rate. No murmur.  No rub. No gallop. No LE edema.   MS: Extremities normal. No clubbing. No cyanosis.  SKIN: No rashes noted  NEURO: Speech normal, normal strength and tone, normal gait and stance  PSYCH: Normal mentation, orientation to person, place, and time.  Results:  44 percutaneous environmental allergen (and 2 controls) extracts placed by MA and read by MD.  Positive to dust mites, cat, dog, rabbit, tree, grass and weed pollen.      Assessment and plan:   49 yo male with chronic seasonal allergic rhinitis resulting in recurrent bacterial rhinosinusitis.  Based on history, physical examination and skin testing performed in office, he has significant allergies to cat, dog, tree pollen, grass pollen, and weed pollen.  We discussed in detail his recurrent symptoms are a result of daily exposure to his pets at home and recommended if possible removal.  If this is not a possibility, recommendation to limit any time pets spend in bedroom as well as interactions with these animals.  For symptom control, he is to start dymista nasal spray 1 spray in each nostril twice a day.  If this is too expensive, we will use fluticasone (flonase) and azelastine 1 spray in each nostril twice a day.  He will also start high intensity ceterizine 10 mg (zyrtec) twice a day.  If  getting tired on this regimen, change the morning dose to fexofenadine (Allegra) 180 mg.   For long term control, he will consider allergy shots.  He will follow up in the 6 months or sooner if symptoms not well controlled and/or desires starting allergy immunotherapy.    The patient was seen and discussed with Dr. Sauer, the attending, who agrees with the plan as stated above.    Kleber Tirado MD  IM/PEDS, PGY4      Physician Attestation   I, Jeronimo Sauer, saw this patient with the resident and agree with the resident/fellow's findings and plan of care as documented in the note.          Jeronimo Sauer MD  Date of Service (when I saw the patient): 06/03/19    Answers for HPI/ROS submitted by the patient on 6/2/2019   General Symptoms: No  Skin Symptoms: No  HENT Symptoms: Yes  EYE SYMPTOMS: No  HEART SYMPTOMS: No  LUNG SYMPTOMS: Yes  INTESTINAL SYMPTOMS: No  URINARY SYMPTOMS: No  REPRODUCTIVE SYMPTOMS: No  SKELETAL SYMPTOMS: No  BLOOD SYMPTOMS: No  NERVOUS SYSTEM SYMPTOMS: No  MENTAL HEALTH SYMPTOMS: No  Ear pain: No  Ear discharge: No  Hearing loss: No  Tinnitus: No  Nosebleeds: No  Congestion: Yes  Sinus pain: No  Trouble swallowing: No   Voice hoarseness: Yes  Mouth sores: No  Sore throat: No  Tooth pain: No  Gum tenderness: No  Bleeding gums: No  Change in taste: Yes  Change in sense of smell: Yes  Dry mouth: No  Hearing aid used: No  Neck lump: No  Cough: Yes  Sputum or phlegm: Yes  Coughing up blood: No  Difficulty breating or shortness of breath: No  Snoring: No  Wheezing: No  Difficulty breathing on exertion: Yes  Nighttime Cough: No  Difficulty breathing when lying flat: No      Again, thank you for allowing me to participate in the care of your patient.      Sincerely,    Jeronimo Sauer MD

## 2019-06-03 NOTE — PROGRESS NOTES
Reason for Visit  Dion Thomson is a 48 year old male who is referred by iVral Rivera for rhinorrhea, nasal congestion and difficulty with inspiration.  He first noticed these symptoms approximately 1 year ago.  He has clear rhinorrhea almost daily clearance.  He has frequent throat clearing.  No conjunctivitis or itchy eyes.  No ear pain or fullness.  No sinus pressure or pain.  No frequent or persist cough.  No change in coloration of urine. He has a waxing and waning course associated with these symptoms.  He does not notice any worsening with symptoms in outdoor setting.  He rarely has productive sputum, green in coloration.  He has a personal history of mild intermittent asthma.  He was evaluated by Dr. Lang at [facility].  He lost his sense of taste and smell around this time.  He does Symbicort 2 puffs twice daily with improvement in his symptoms.  He also had a trial of duoneb with minimal effect.  He has had several rounds of antibiotics throughout the past year 10-14 days in duration.  He had 2-3 courses of prednisone for his symptoms wih improvement in smell and taste.  Hx of seasonal and hay allergies as well as cats.  He has two cats, a rabbit, and dog at home.  He exchanges his HVAC filter regularly.   No visualized mold at home.  He works at licensed psycho therapist.  No chemical or fume exposure.  No excessive gardening or spelunking.  He spends a fair amount of time in the basement and recalls being told higher level of radon (lived in home for 5 years).  He current daily regimen involves Symbicort, PRN albuterol/duoneb, and flonase.    His daughter has EDS, POTS, and possible mast cell activation.  No autoimmune disease in the family.  He grew up in a household of smokers.  He has no personal history of smoking.      Allergy HPI      The patient was seen and examined by Jeronimo Queen MD   Current Outpatient Medications   Medication     albuterol (PROAIR HFA/PROVENTIL  HFA/VENTOLIN HFA) 108 (90 Base) MCG/ACT inhaler     atorvastatin (LIPITOR) 10 MG tablet     Cholecalciferol (VITAMIN D) 2000 UNITS tablet     omeprazole (PRILOSEC) 20 MG CR capsule     SYMBICORT 160-4.5 MCG/ACT Inhaler     vitamin  B complex with vitamin C (VITAMIN  B COMPLEX) TABS     azelastine (ASTELIN) 0.1 % nasal spray     azithromycin (ZITHROMAX) 250 MG tablet     cetirizine (ZYRTEC) 10 MG tablet     fluticasone (FLONASE) 50 MCG/ACT nasal spray     ipratropium - albuterol 0.5 mg/2.5 mg/3 mL (DUONEB) 0.5-2.5 (3) MG/3ML neb solution     Ipratropium-Albuterol (COMBIVENT RESPIMAT)  MCG/ACT inhaler     montelukast (SINGULAIR) 10 MG tablet     predniSONE (DELTASONE) 20 MG tablet     No current facility-administered medications for this visit.      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     Employer: SELF   Social Needs     Financial resource strain: Not on file     Food insecurity:     Worry: Not on file     Inability: Not on file     Transportation needs:     Medical: Not on file     Non-medical: Not on file   Tobacco Use     Smoking status: Never Smoker     Smokeless tobacco: Never Used   Substance and Sexual Activity     Alcohol use: Yes     Alcohol/week: 0.0 - 0.5 oz     Comment: Occasional     Drug use: No     Sexual activity: Yes     Partners: Female     Birth control/protection: Pill, Male Surgical   Lifestyle     Physical activity:     Days per week: Not on file     Minutes per session: Not on file     Stress: Not on file   Relationships     Social connections:     Talks on phone: Not on file     Gets together: Not on file     Attends Advent service: Not on  "file     Active member of club or organization: Not on file     Attends meetings of clubs or organizations: Not on file     Relationship status: Not on file     Intimate partner violence:     Fear of current or ex partner: Not on file     Emotionally abused: Not on file     Physically abused: Not on file     Forced sexual activity: Not on file   Other Topics Concern     Parent/sibling w/ CABG, MI or angioplasty before 65F 55M? Not Asked   Social History Narrative     Not on file     Past Medical History:   Diagnosis Date     Allergic rhinitis      Anxiety      Gastroesophageal reflux disease      Hypercholesteremia      Intermittent asthma 2013     Obstructive sleep apnea      URBANO (obstructive sleep apnea) 2014     Past Surgical History:   Procedure Laterality Date     ADENOIDECTOMY       ARTHROSCOPIC RECONSTRUCTION ANTERIOR CRUCIATE LIGAMENT      Open, Knee Left     ARTHROSCOPY KNEE RT/LT      Left     GENITOURINARY SURGERY      Vasectomy     TONSILLECTOMY       TONSILLECTOMY & ADENOIDECTOMY  5 yo     Family History   Problem Relation Age of Onset     Coronary Artery Disease Father      Diabetes Mother          since      Depression Mother         Bipolar disorder     Cancer Other         multiple, both sides     Heart Disease Other      Diabetes Other         Great aunt     Diabetes Maternal Grandmother          ROS   A complete ROS was otherwise negative except as noted in the HPI and the end of the note.  /73 (BP Location: Right arm, Patient Position: Chair, Cuff Size: Adult Large)   Pulse 71   Resp 16   Ht 1.753 m (5' 9.02\")   Wt 99.8 kg (220 lb)   SpO2 97%   BMI 32.47 kg/m    Exam:   GENERAL APPEARANCE: Well developed, well nourished, alert, and in no apparent distress.  EYES: PERRL, EOMI, conjunctiva clear non-injected  HENT: Nasal mucosa with mild erythema and boggy turbinates. No nasal polyps.  No facial tenderness. Did have thick discharge that he blew his nose " and it was removed.  EARS: Canals clear, TMs normal  MOUTH: Oral mucosa is moist, without any lesions, no tonsillar enlargement, no oropharyngeal exudate.  NECK: Supple, no masses, no thyromegaly.  LYMPHATICS: No significant cervical, or supraclavicular nodes.  RESP: Good air flow throughout.  No crackles. No rhonchi. No wheezes.  CV: Normal S1, S2, regular rhythm, normal rate. No murmur.  No rub. No gallop. No LE edema.   MS: Extremities normal. No clubbing. No cyanosis.  SKIN: No rashes noted  NEURO: Speech normal, normal strength and tone, normal gait and stance  PSYCH: Normal mentation, orientation to person, place, and time.  Results:  44 percutaneous environmental allergen (and 2 controls) extracts placed by MA and read by MD.  Positive to dust mites, cat, dog, rabbit, tree, grass and weed pollen.      Assessment and plan:   49 yo male with chronic seasonal allergic rhinitis resulting in recurrent bacterial rhinosinusitis.  Based on history, physical examination and skin testing performed in office, he has significant allergies to cat, dog, tree pollen, grass pollen, and weed pollen.  We discussed in detail his recurrent symptoms are a result of daily exposure to his pets at home and recommended if possible removal.  If this is not a possibility, recommendation to limit any time pets spend in bedroom as well as interactions with these animals.  For symptom control, he is to start dymista nasal spray 1 spray in each nostril twice a day.  If this is too expensive, we will use fluticasone (flonase) and azelastine 1 spray in each nostril twice a day.  He will also start high intensity ceterizine 10 mg (zyrtec) twice a day.  If getting tired on this regimen, change the morning dose to fexofenadine (Allegra) 180 mg.   For long term control, he will consider allergy shots.  He will follow up in the 6 months or sooner if symptoms not well controlled and/or desires starting allergy immunotherapy.    The patient was  seen and discussed with Dr. Sauer, the attending, who agrees with the plan as stated above.    Kleber Tirado MD  IM/PEDS, PGY4      Physician Attestation   I, Jeronimo Sauer, saw this patient with the resident and agree with the resident/fellow's findings and plan of care as documented in the note.          Jeronimo Sauer MD  Date of Service (when I saw the patient): 06/03/19    Answers for HPI/ROS submitted by the patient on 6/2/2019   General Symptoms: No  Skin Symptoms: No  HENT Symptoms: Yes  EYE SYMPTOMS: No  HEART SYMPTOMS: No  LUNG SYMPTOMS: Yes  INTESTINAL SYMPTOMS: No  URINARY SYMPTOMS: No  REPRODUCTIVE SYMPTOMS: No  SKELETAL SYMPTOMS: No  BLOOD SYMPTOMS: No  NERVOUS SYSTEM SYMPTOMS: No  MENTAL HEALTH SYMPTOMS: No  Ear pain: No  Ear discharge: No  Hearing loss: No  Tinnitus: No  Nosebleeds: No  Congestion: Yes  Sinus pain: No  Trouble swallowing: No   Voice hoarseness: Yes  Mouth sores: No  Sore throat: No  Tooth pain: No  Gum tenderness: No  Bleeding gums: No  Change in taste: Yes  Change in sense of smell: Yes  Dry mouth: No  Hearing aid used: No  Neck lump: No  Cough: Yes  Sputum or phlegm: Yes  Coughing up blood: No  Difficulty breating or shortness of breath: No  Snoring: No  Wheezing: No  Difficulty breathing on exertion: Yes  Nighttime Cough: No  Difficulty breathing when lying flat: No

## 2019-06-03 NOTE — PATIENT INSTRUCTIONS
Dymista nasal spray 1 spray in each nostril twice a day.  If this is expensive we will use fluticasone (flonase) and azelastine 1 spray in each nostril twice a day.  Ceterizine 10 mg (zyrtec) twice a day.  If getting tired on this change the morning dose to fexofenadine (Allegra) 180 mg.  Consider allergy shots    Animal Allergy    The number of pets in the [place] is estimated at more than 150 million. This large number also increases the likelihood of accidental exposure to animals by the allergic patient when visiting homes and public places.    Ways to reduce reactions/complications:    The ideal situation for an allergic reaction would be to not have any pet at all. However, many pet owners feel strongly about their pets, and would rather remove the allergic individual from the home than the pet! A pet such as a dog or cat should at least be kept out of the patient's bedroom. Recent studies have shown that bathing a cat or dog once a week can decrease significantly the amount of the allergen that the allergic patient is exposed to. The avid pet owner may claim that exposure to his or her pet does not cause them any problem. This however, should be viewed skeptically since pet ownership is an emotionally charged subject. Also, many allergic pet owners are rarely away from their pets, so an accurate reporting of pet related symptoms may not be possible. The best test to confirm if a person is allergic to a pet is removal from the home for several weeks and a thorough cleaning done by the non-allergic individual to remove the hair and dander. It should be understood that it could take weeks for meticulous cleaning to remove all the animal hair and dander before a change in the allergic patient is noticed. A frequently mistaken idea is that shorthaired animals cause fewer problems. It is the dander (flakes of skin) that causes the most significant allergic reactions, not the length of the hair on the pet.  Allergens are also found in the pet's saliva and urine. In addition, long haired animals that are outside can then bring outdoors pollens inside and exposing allergic individuals.    Indoor pets should be restricted to a few rooms in the home if possible. Isolating the pet to one room however, will not limit the allergens to that room. Air currents from forced-air heating and air conditioning will spread the allergens throughout the house. Homes with forced-air heating and/or air conditioning may be fitted with a central air . This may remove significant amounts of pet allergens from the home. The air  should be used at least four hours per day.     Best Pets for Allergenic Individuals:    The best types of pets for allergic patients are tropical fish, lizards, turtles, salamanders and certain types of frogs and tortoises. All of these pets do not have hair, fur or dander nor does their excrement create allergic problems. However, patients should keep in mind that large aquariums can add water vapor in a room, thus increasing mold and house dust mite concentrations in their home.         *All information has been reviewed, updated and approved by:  Dr. Jeronimo [facility]auer[facility] updated: 11/2016          DUST MITE ALLERGY  Dust mites are microscopic bugs that live in dust, feeding on dead skin from our bodies. Dust mites flourish in warm, humid environments and therefore are highest  in number in carpeting, pillows and mattresses.     Tips:    Encase pillows, mattresses, and box springs in zippered allergy proof covers.    Wash all bed linens in at least 1300 F every week.    Remove stuffed animals or freeze them every other week.     Remove upholstered furniture from the bedroom and consider removing the carpet.     Keep ceiling fans off in the bedroom as they can stir up dust mite allergens.    Frequently dust and vacuum the house, especially the  "bedroom.    Acaracides (chemicals which kill mites) are available for use in the home. These acaracides require repeated applications to remain effective and may irritate asthmatics. It is still unclear how much, if any clinical benefit is gained by the use of acaracides. Therefore these agents are usually not recommended for initial mite control.        *All information has been reviewed, updated and approved by:  Dr. Jeronimo Sauer-[place] [facility] updated: 11/2016         Pollen Control Measures      * Keep windows and doors shut and run air conditioner at all times. This keeps outdoor air outside.    * Keep windows closed while in the car and air conditioner on the \"re-circulate\" mode.    * Wash your hair before going to bed at night to reduce exposure during sleep.    * Keep pets outdoors to prevent the pollen from being brought in on their hair.    * Avoid hanging clothes and linens outside as pollens may collect on the items.     * Don't mow the lawn if you are allergic to grass or weeds. If you must mow the grass, wear a face mask.       Spring: Tree Pollen    Summer: Grass Pollen and Mold    Fall: Weed Pollen and Mold      *All information has been reviewed, updated and approved by:  Dr. Jeronimo Sauer[facility] [facility] updated: 11/2016      IMMUNOTHERAPY (ALLERGY SHOTS)  General Information    What are allergy shots and what can they do for me?  --Allergy shots (otherwise known as immunotherapy or desensitization) help make your immune system less sensitive to the things that cause your allergy symptoms.    Should I have allergy shots?  --MAYBE--If you are allergic to things that are unavoidable such as trees, grasses, weeds, molds, dust or animals.  --MAYBE--If you have to take medicine(s) more often than not to control your allergies; OR despite your medicines, you are still having allergy symptoms.  --MAYBE--If your allergies are interfering with " daily activities.    What can allergy shots do for me?  --Eventually, you may no longer need your allergy medicine. You should have fewer or milder allergy symptoms. You may no longer have allergy symptoms. You may not have to visit your health care provider as often.     Can allergy shots really help?  --The success rate for allergy shots is high ~75-85%. Many people consider themselves  cured , but some will suffer a recurrence. Some people will require shots for longer than 5 years, but most people who complete the allergy shot program do not need to take shots again. Typically immunotherapy is complete around 3 years, but it depends on how well the doseages are tolerated, but the doctor will decide.    How do allergy shots work?  --Allergy shots result in your body making  blocking IgG  antibodies to your  allergy IgE  antibodies. This makes you react less to the things that cause your allergy symptoms. If your allergies are  blocked , you do not release histamines or other allergy mediators that cause your allergy symptoms.     How long before allergy shots start to work?  --Allergy shots may start to work in four to six months. For some people it may take a year. If there is no improvement after two years, discuss this with your doctor or nurse practitioner. The typical maximum benefit from allergy shots usually occurs within the first 1-2 years after reaching and adequate maintenance dose.     Can you still take your other medications?  --The goal of the allergy shots is to allow you to feel as good as possible, with as little medication as possible, and with as few shots as possible. You may still need to take your medications until the allergy shots begin to work.     How much do allergy shots cost?  --Allergy shots may be somewhat costly the first year when more frequent allergy injections are needed. The vial of allergy extract is a separate cost from the actual injection. Your insurance is billed when  the allergy vial of extract is made and the injection cost is billed when the shot is administered. Coverage of the cost by insurance plans and HMO varies. In succeeding years, when the injections are spread out to 2-8 weeks, the cost dramatically decreases. Allergy shots may save you money, as you will not require as much medicine, and as you will decrease the number of visits to your health care provider.    Are allergy shots painful?  --The needle used to give shots is very small and thin. The shot is given in the fatty part of the arm, not into the muscle. Most people say that when the allergy shot is given that it does not hurt.     Are allergy shots risky?  --Allergic reactions are rare. Reactions can occur because you are receiving small amounts of what you are allergic to. The most serious reactions occur within 30 minutes of the injection.       LOCAL REACTIONS:  Some swelling and/or redness may occur in the first twenty minutes. If there is no discomfort, this may be ignored. If there is a local reaction (the size of a twenty-five cent piece), or a hive which lasts longer than 24 hours, please notifiy us before receiving the next injection. Occasionally, you may develop a delayed local reaction between 4-48 hours. These reactions require no treatment, but may be counteracted by using a cool compress and by taking a dose of an antihistamine, plus Tylenol (Acetaminophen) for pain.  SYSTEMIC REACTIONS: This type of reaction is more serious. It can consist of symptoms of throat tightening, difficulty breathing, fainting, vomiting, or hives, amongst others. If you are still in the office/clinic, immediately notify a health care provider. If you have left the office/clinic, seek immediate health care assistance. Systemic reactions require immediate treatment and notification of our office. Treatment of systemic reactions requires the administration of adrenaline (epinephrine) and evaluation by a physician.  Please call our office at [phone] ext. [phone] to speak to a nurse during business hours. If after hours please go to the ER.      **EVERYONE MUST WAIT 30-MINUTES AFTER EACH ALLERGY SHOT IN CASE OF A REACTION!!**                        *All information has been reviewed, updated and approved by:  Dr. Jeronimo Sauer[facility] updated: 1/2017

## 2019-06-03 NOTE — NURSING NOTE
Chief Complaint   Patient presents with     Consult     Allergy    Pt was due for PHQ2. Pt completed. Updated health maintenance. See Screenings.   Asthma control questionnaire completed. See screening. Health maintenance updated.  Carly Valenzuela, MIKEL Valenzuela, CMA

## 2019-06-04 LAB
EXPTIME-PRE: 7.54 SEC
FEF2575-%PRED-PRE: 108 %
FEF2575-PRE: 4.03 L/SEC
FEF2575-PRED: 3.71 L/SEC
FEFMAX-%PRED-PRE: 108 %
FEFMAX-PRE: 10.65 L/SEC
FEFMAX-PRED: 9.77 L/SEC
FEV1-%PRED-PRE: 102 %
FEV1-PRE: 4.03 L
FEV1FEV6-PRE: 82 %
FEV1FEV6-PRED: 81 %
FEV1FVC-PRE: 82 %
FEV1FVC-PRED: 80 %
FIFMAX-PRE: 5.5 L/SEC
FVC-%PRED-PRE: 99 %
FVC-PRE: 4.93 L
FVC-PRED: 4.93 L

## 2019-06-04 ASSESSMENT — ASTHMA QUESTIONNAIRES: ACT_TOTALSCORE: 21

## 2019-06-24 ENCOUNTER — PRE VISIT (OUTPATIENT)
Dept: OTOLARYNGOLOGY | Facility: CLINIC | Age: 49
End: 2019-06-24

## 2019-06-25 ENCOUNTER — TRANSFERRED RECORDS (OUTPATIENT)
Dept: FAMILY MEDICINE | Facility: CLINIC | Age: 49
End: 2019-06-25

## 2019-07-17 ENCOUNTER — OFFICE VISIT (OUTPATIENT)
Dept: URGENT CARE | Facility: URGENT CARE | Age: 49
End: 2019-07-17
Payer: COMMERCIAL

## 2019-07-17 VITALS
WEIGHT: 220 LBS | RESPIRATION RATE: 16 BRPM | DIASTOLIC BLOOD PRESSURE: 76 MMHG | HEIGHT: 69 IN | OXYGEN SATURATION: 97 % | BODY MASS INDEX: 32.58 KG/M2 | SYSTOLIC BLOOD PRESSURE: 120 MMHG | HEART RATE: 73 BPM

## 2019-07-17 DIAGNOSIS — H66.001 NON-RECURRENT ACUTE SUPPURATIVE OTITIS MEDIA OF RIGHT EAR WITHOUT SPONTANEOUS RUPTURE OF TYMPANIC MEMBRANE: Primary | ICD-10-CM

## 2019-07-17 PROCEDURE — 99213 OFFICE O/P EST LOW 20 MIN: CPT | Performed by: PHYSICIAN ASSISTANT

## 2019-07-17 RX ORDER — AMOXICILLIN 875 MG
875 TABLET ORAL 2 TIMES DAILY
Qty: 20 TABLET | Refills: 0 | Status: SHIPPED | OUTPATIENT
Start: 2019-07-17 | End: 2019-08-12

## 2019-07-17 ASSESSMENT — PAIN SCALES - GENERAL: PAINLEVEL: NO PAIN (0)

## 2019-07-17 ASSESSMENT — MIFFLIN-ST. JEOR: SCORE: 1868.6

## 2019-07-18 NOTE — PROGRESS NOTES
HPI:  Dion is a 47 yo male who presents for right ear pain x today.  Felt plugged at onset but became increasing painful as day went on.  No fever.  Denies nasal congestion or cold sx.  No discharge.  Has recently had some seasonal allergy sx but reports they have improved over last day or so.       ROS:  See HPI      PE:  Vitals & nursing notes reviewed. B/P: 120/76, T: Data Unavailable, P: 73, R: 16  Constitutional:  Alert, well nourished, well-developed, NAD  Head:  Atraumatic, normocephalic  Eyes:  Perrla, EOMI, conjunctiva:  Pink   Sclera:  Anicteric  Ears:  Canals clear BL,  RT TM - (+) erythema, opacity, thickening.   LT TM pearly  Throat:  No erythema, exudates, or edema to postoropharynx  Neck:  Supple, no cervical LAD      ASSESSMENT:  1. Right otitis media  Plan: amoxicillin (AMOXIL) 875 MG tablet   Tylenol or advil for pain & fever PRN   F/U with PCP if sx persist or worsen.

## 2019-07-22 ENCOUNTER — OFFICE VISIT (OUTPATIENT)
Dept: OTOLARYNGOLOGY | Facility: CLINIC | Age: 49
End: 2019-07-22
Payer: COMMERCIAL

## 2019-07-22 VITALS
BODY MASS INDEX: 32.88 KG/M2 | RESPIRATION RATE: 16 BRPM | HEART RATE: 72 BPM | DIASTOLIC BLOOD PRESSURE: 75 MMHG | SYSTOLIC BLOOD PRESSURE: 116 MMHG | WEIGHT: 222.8 LBS

## 2019-07-22 DIAGNOSIS — J01.01 ACUTE RECURRENT MAXILLARY SINUSITIS: ICD-10-CM

## 2019-07-22 DIAGNOSIS — J34.89 NASAL OBSTRUCTION: Primary | ICD-10-CM

## 2019-07-22 RX ORDER — DOXYCYCLINE 100 MG/1
100 CAPSULE ORAL 2 TIMES DAILY
Qty: 42 CAPSULE | Refills: 0 | Status: SHIPPED | OUTPATIENT
Start: 2019-07-22 | End: 2019-09-26

## 2019-07-22 RX ORDER — PREDNISONE 20 MG/1
TABLET ORAL
Qty: 25 TABLET | Refills: 0 | Status: SHIPPED | OUTPATIENT
Start: 2019-07-22 | End: 2019-08-12

## 2019-07-22 ASSESSMENT — PAIN SCALES - GENERAL: PAINLEVEL: NO PAIN (0)

## 2019-07-22 NOTE — PATIENT INSTRUCTIONS
You were seen in the ENT clinic today with Dr. Garcia    Recommendations for you:    -Doxycycline 100 mg twice daily for 21 days   -Prednisone 40 mg daily for 10 days. 20 mg daily for 5 days      We would like you to follow up in three weeks with a CT scan. We will discuss surgery at that time.        Please call our clinic for any questions, concerns, and/or worsening symptoms.      Clinic #[phone]       Option 1 for scheduling.    Thank you for allowing us to be apart of your care!    Jade MYERS RNCC    If you need to reach me my direct line is: [phone]

## 2019-07-22 NOTE — LETTER
7/22/2019       RE: Dion Thomson  [address] [address]     Dear Colleague,    Thank you for referring your patient, Dion Thomson, to the [facility] EAR [facility]ROAT at [facility]. Please see a copy of my visit note below.    HISTORY OF PRESENT ILLNESS:  Mr. Thomson is here to see us today.  He is a very pleasant 48-year-old gentleman who comes in with a year long history of nasal congestion and hyposmia.  He has had some issues with postnasal drainage which is thick.  He has been on numerous courses of antibiotics and steroids without substantial improvement.  He denies any pain or pressure that is unbearable.  He does have a history of asthma and had noticed an asthma exacerbation around the time that this started to really bad about a year ago.  He does also note that prior to that, he has had issues with nasal congestion and has not had any substantial problems with infections in the past.  He was recently diagnosed with a right otitis media and is on amoxicillin with improvement but still plugging.  He does not have a regular problem with that as well.      PHYSICAL EXAMINATION:  On examination, the patient is in no distress, alert and appropriate, breathing without difficulty or stridor.  Eyes are anicteric.  Skin of the head and neck appears normal.  Examination of the nose shows nasal mucosal edema.        PROCEDURE:  The nose then sprayed with lidocaine and Afrin and endoscopy is performed.  The patient has bilateral nasal polyposis with thick rhinorrhea, but not purulent.      IMAGING:  A CT scan reviewed shows pansinusitis.      ASSESSMENT:  A 48-year-old with substantial inflammatory change of the nose, both clinically and on CT scan.  We discussed risks and benefits of medical treatment versus surgical intervention.        PLAN:  Plan will be for scheduling surgery for septoplasty and bilateral FESS as well as follow up in three weeks with a repeat CT  scan after a course of prednisone and antibiotics. If things are substantially better on that CT scan, we could consider cancelling surgery.         Again, thank you for allowing me to participate in the care of your patient.      Sincerely,    Willi Garcia MD

## 2019-07-22 NOTE — PROGRESS NOTES
HISTORY OF PRESENT ILLNESS:  Mr. Thomson is here to see us today.  He is a very pleasant 48-year-old gentleman who comes in with a year long history of nasal congestion and hyposmia.  He has had some issues with postnasal drainage which is thick.  He has been on numerous courses of antibiotics and steroids without substantial improvement.  He denies any pain or pressure that is unbearable.  He does have a history of asthma and had noticed an asthma exacerbation around the time that this started to really bad about a year ago.  He does also note that prior to that, he has had issues with nasal congestion and has not had any substantial problems with infections in the past.  He was recently diagnosed with a right otitis media and is on amoxicillin with improvement but still plugging.  He does not have a regular problem with that as well.      PHYSICAL EXAMINATION:  On examination, the patient is in no distress, alert and appropriate, breathing without difficulty or stridor.  Eyes are anicteric.  Skin of the head and neck appears normal.  Examination of the nose shows nasal mucosal edema.        PROCEDURE:  The nose then sprayed with lidocaine and Afrin and endoscopy is performed.  The patient has bilateral nasal polyposis with thick rhinorrhea, but not purulent.      IMAGING:  A CT scan reviewed shows pansinusitis.      ASSESSMENT:  A 48-year-old with substantial inflammatory change of the nose, both clinically and on CT scan.  We discussed risks and benefits of medical treatment versus surgical intervention.        PLAN:  Plan will be for scheduling surgery for septoplasty and bilateral FESS as well as follow up in three weeks with a repeat CT scan after a course of prednisone and antibiotics. If things are substantially better on that CT scan, we could consider cancelling surgery.

## 2019-08-09 ENCOUNTER — ANCILLARY PROCEDURE (OUTPATIENT)
Dept: CT IMAGING | Facility: CLINIC | Age: 49
End: 2019-08-09
Attending: OTOLARYNGOLOGY
Payer: COMMERCIAL

## 2019-08-09 DIAGNOSIS — J34.89 NASAL OBSTRUCTION: ICD-10-CM

## 2019-08-09 DIAGNOSIS — J01.01 ACUTE RECURRENT MAXILLARY SINUSITIS: ICD-10-CM

## 2019-08-12 ENCOUNTER — OFFICE VISIT (OUTPATIENT)
Dept: OTOLARYNGOLOGY | Facility: CLINIC | Age: 49
End: 2019-08-12
Payer: COMMERCIAL

## 2019-08-12 VITALS
RESPIRATION RATE: 18 BRPM | BODY MASS INDEX: 32.58 KG/M2 | HEART RATE: 77 BPM | SYSTOLIC BLOOD PRESSURE: 108 MMHG | OXYGEN SATURATION: 96 % | TEMPERATURE: 98.2 F | DIASTOLIC BLOOD PRESSURE: 70 MMHG | HEIGHT: 69 IN | WEIGHT: 220 LBS

## 2019-08-12 DIAGNOSIS — J33.9 NASAL POLYP: Primary | ICD-10-CM

## 2019-08-12 ASSESSMENT — MIFFLIN-ST. JEOR: SCORE: 1872.9

## 2019-08-12 ASSESSMENT — PAIN SCALES - GENERAL: PAINLEVEL: NO PAIN (0)

## 2019-08-12 NOTE — PROGRESS NOTES
HISTORY OF PRESENT ILLNESS:  Dion is back to see us today.  He notes substantial improvement with nasal function after his course of prednisone.  He continues the Astelin and Flonase.  He is smelling.  He is breathing more comfortably today.        IMAGING:  We reviewed his followup CT scan which demonstrates an improvement but still persistent substantial inflammatory change within the nose.      ASSESSMENT/PLAN:  We discussed further plans for surgery and what it would like, to include bilateral image-guided FESS and turbinate reduction. We discussed the risks and benefits of that.  He is scheduled for September 9, 2019.  We will plan on continuing with that plan unless something should change.  He will let us know in the future.      A total of 15 minutes was spent with the patient, all that on counseling and coordination of care regarding the CT scan and surgery plan.

## 2019-08-12 NOTE — LETTER
8/12/2019       RE: Dion Thomson  [address] [place] [address]     Dear Colleague,    Thank you for referring your patient, Dion Thomson, to the [facility] at [facility]. Please see a copy of my visit note below.    HISTORY OF PRESENT ILLNESS:  Dion is back to see us today.  He notes substantial improvement with nasal function after his course of prednisone.  He continues the Astelin and Flonase.  He is smelling.  He is breathing more comfortably today.        IMAGING:  We reviewed his followup CT scan which demonstrates an improvement but still persistent substantial inflammatory change within the nose.      ASSESSMENT/PLAN:  We discussed further plans for surgery and what it would like, to include bilateral image-guided FESS and turbinate reduction. We discussed the risks and benefits of that.  He is scheduled for September 9, 2019.  We will plan on continuing with that plan unless something should change.  He will let us know in the future.      A total of 15 minutes was spent with the patient, all that on counseling and coordination of care regarding the CT scan and surgery plan.         Again, thank you for allowing me to participate in the care of your patient.      Sincerely,    Willi Garcia MD

## 2019-08-12 NOTE — NURSING NOTE
"Chief Complaint   Patient presents with     RECHECK     3 week follow up      Blood pressure 108/70, pulse 77, temperature 98.2  F (36.8  C), resp. rate 18, height 1.76 m (5' 9.29\"), weight 99.8 kg (220 lb), SpO2 96 %.    Jurgen Banks LPN    "

## 2019-09-09 DIAGNOSIS — J01.01 ACUTE RECURRENT MAXILLARY SINUSITIS: Primary | ICD-10-CM

## 2019-09-09 RX ORDER — PREDNISONE 20 MG/1
TABLET ORAL
Qty: 25 TABLET | Refills: 0 | Status: SHIPPED | OUTPATIENT
Start: 2019-09-09 | End: 2019-09-26

## 2019-09-09 RX ORDER — DOXYCYCLINE 100 MG/1
100 CAPSULE ORAL 2 TIMES DAILY
Qty: 42 CAPSULE | Refills: 0 | Status: SHIPPED | OUTPATIENT
Start: 2019-09-09 | End: 2019-09-30

## 2019-09-25 ENCOUNTER — PRE VISIT (OUTPATIENT)
Dept: SURGERY | Facility: CLINIC | Age: 49
End: 2019-09-25

## 2019-09-25 DIAGNOSIS — J01.01 ACUTE RECURRENT MAXILLARY SINUSITIS: ICD-10-CM

## 2019-09-25 NOTE — TELEPHONE ENCOUNTER
FUTURE VISIT INFORMATION      SURGERY INFORMATION:    Date: 19    Location: [facility] OR    Surgeon:  Willi Echevarria    Anesthesia Type:  General    RECORDS REQUESTED FROM:       Primary Care Provider: Viral Ballesteros cory    Pertinent Medical History:    Most recent EKG+ Tracin/22/15    Most recent ECHO:    Most recent Cardiac Stress Test: 2/6/15    Most recent Coronary Angiogram:    Most recent PFT's: 6/3/19    Most recent Sleep Study:

## 2019-09-26 ENCOUNTER — OFFICE VISIT (OUTPATIENT)
Dept: SURGERY | Facility: CLINIC | Age: 49
End: 2019-09-26
Payer: COMMERCIAL

## 2019-09-26 ENCOUNTER — PRE VISIT (OUTPATIENT)
Dept: SURGERY | Facility: CLINIC | Age: 49
End: 2019-09-26

## 2019-09-26 ENCOUNTER — ANESTHESIA EVENT (OUTPATIENT)
Dept: SURGERY | Facility: AMBULATORY SURGERY CENTER | Age: 49
End: 2019-09-26

## 2019-09-26 VITALS
TEMPERATURE: 98 F | BODY MASS INDEX: 32.58 KG/M2 | WEIGHT: 220 LBS | RESPIRATION RATE: 18 BRPM | DIASTOLIC BLOOD PRESSURE: 77 MMHG | SYSTOLIC BLOOD PRESSURE: 127 MMHG | HEART RATE: 73 BPM | OXYGEN SATURATION: 96 % | HEIGHT: 69 IN

## 2019-09-26 DIAGNOSIS — Z01.818 PREOP EXAMINATION: Primary | ICD-10-CM

## 2019-09-26 DIAGNOSIS — J32.9 CHRONIC SINUSITIS, UNSPECIFIED LOCATION: ICD-10-CM

## 2019-09-26 DIAGNOSIS — Z01.818 PREOP EXAMINATION: ICD-10-CM

## 2019-09-26 LAB
CREAT SERPL-MCNC: 0.82 MG/DL (ref 0.66–1.25)
GFR SERPL CREATININE-BSD FRML MDRD: >90 ML/MIN/{1.73_M2}
HGB BLD-MCNC: 15.3 G/DL (ref 13.3–17.7)
POTASSIUM SERPL-SCNC: 3.6 MMOL/L (ref 3.4–5.3)

## 2019-09-26 RX ORDER — ACETAMINOPHEN 500 MG
1000 TABLET ORAL EVERY 6 HOURS PRN
COMMUNITY

## 2019-09-26 RX ORDER — COVID-19 ANTIGEN TEST
220 KIT MISCELLANEOUS PRN
COMMUNITY

## 2019-09-26 RX ORDER — FEXOFENADINE HCL 60 MG/1
60 TABLET, FILM COATED ORAL EVERY MORNING
COMMUNITY
End: 2022-04-01

## 2019-09-26 ASSESSMENT — MIFFLIN-ST. JEOR: SCORE: 1868.29

## 2019-09-26 ASSESSMENT — PAIN SCALES - GENERAL: PAINLEVEL: NO PAIN (0)

## 2019-09-26 NOTE — ANESTHESIA PREPROCEDURE EVALUATION
Anesthesia Pre-Procedure Evaluation    Patient: Dion Thomson   MRN:     [id number] Gender:   male   Age:    48 year old :      1970        Preoperative Diagnosis: Chronic Sinusitis   Procedure(s):  Image Guided Bilateral Functional Endoscopic Sinus Surgery  Septoplasty     Past Medical History:   Diagnosis Date     Allergic rhinitis      Anxiety      Gastroesophageal reflux disease      Hypercholesteremia      Intermittent asthma 2013     Obstructive sleep apnea      URBANO (obstructive sleep apnea) 2014      Past Surgical History:   Procedure Laterality Date     ADENOIDECTOMY       ARTHROSCOPIC RECONSTRUCTION ANTERIOR CRUCIATE LIGAMENT      Open, Knee Left     ARTHROSCOPY KNEE RT/LT      Left     GENITOURINARY SURGERY      Vasectomy     TONSILLECTOMY       TONSILLECTOMY & ADENOIDECTOMY  5 yo          Anesthesia Evaluation     . Pt has had prior anesthetic. Type: General and Regional           ROS/MED HX    ENT/Pulmonary:     (+)sleep apnea, allergic rhinitis, Mild Persistent asthma Last exacerbation: 2018,Treatment: Inhaled steroids,  uses CPAP , . .    Neurologic:  - neg neurologic ROS     Cardiovascular:     (+) ----. : . . . :. . Previous cardiac testing date:results:Stress Testdate: results:negativeECG reviewed date: results: date: results:          METS/Exercise Tolerance:  >4 METS   Hematologic:  - neg hematologic  ROS       Musculoskeletal:   (+)  other musculoskeletal- intermittent low back pain      GI/Hepatic:     (+) GERD Asymptomatic on medication,       Renal/Genitourinary:  - ROS Renal section negative       Endo:     (+) Obesity, .   (-) chronic steroid usage   Psychiatric:     (+) psychiatric history anxiety      Infectious Disease:  - neg infectious disease ROS       Malignancy:      - no malignancy   Other:    (+) no H/O Chronic Pain,                       PHYSICAL EXAM:   Mental Status/Neuro: A/A/O   Airway: Facies: Feasible  Mallampati: I  Mouth/Opening:  "Full  TM distance: > 6 cm  Neck ROM: Full   Respiratory: Auscultation: CTAB     Resp. Rate: Normal     Resp. Effort: Normal      CV: Rhythm: Regular  Rate: Age appropriate  Heart: Normal Sounds  Edema: None   Comments: Tooth #9 with enamel/vaneer covering     Dental: Details                LABS:  CBC:   Lab Results   Component Value Date    WBC 10.0 10/09/2018    WBC 6.8 09/21/2018    HGB 15.0 10/09/2018    HGB 14.8 09/21/2018    HCT 45.4 10/09/2018    HCT 44.4 09/21/2018     10/09/2018     09/21/2018     BMP:   Lab Results   Component Value Date     09/21/2018     04/21/2017    POTASSIUM 3.7 09/21/2018    POTASSIUM 4.7 04/21/2017    CHLORIDE 102 09/21/2018    CHLORIDE 102 04/21/2017    BUN 12 09/21/2018    BUN 12 09/21/2018    CR 1.03 09/21/2018    CR 0.84 04/21/2017     (H) 09/21/2018    GLC 98 04/21/2017     COAGS: No results found for: PTT, INR, FIBR  POC: No results found for: BGM, HCG, HCGS  OTHER:   Lab Results   Component Value Date    JAMAAL 9.6 09/21/2018    ALBUMIN 4.7 09/21/2018    PROTTOTAL 7.2 09/21/2018    ALT 18 09/21/2018    AST 19 09/21/2018    ALKPHOS 57 09/21/2018    BILITOTAL 0.6 09/21/2018        Preop Vitals    BP Readings from Last 3 Encounters:   09/26/19 127/77   08/12/19 108/70   07/22/19 116/75    Pulse Readings from Last 3 Encounters:   09/26/19 73   08/12/19 77   07/22/19 72      Resp Readings from Last 3 Encounters:   09/26/19 18   08/12/19 18   07/22/19 16    SpO2 Readings from Last 3 Encounters:   09/26/19 96%   08/12/19 96%   07/17/19 97%      Temp Readings from Last 1 Encounters:   09/26/19 98  F (36.7  C) (Oral)    Ht Readings from Last 1 Encounters:   09/26/19 1.753 m (5' 9\")      Wt Readings from Last 1 Encounters:   09/26/19 99.8 kg (220 lb)    Estimated body mass index is 32.49 kg/m  as calculated from the following:    Height as of this encounter: 1.753 m (5' 9\").    Weight as of this encounter: 99.8 kg (220 lb).     LDA:        Assessment:   ASA " SCORE: 2    H&P: History and physical reviewed and following examination; no interval change.   Smoking Status:  Non-Smoker/Unknown   NPO Status: NPO Appropriate     Plan:   Anes. Type:  General   Pre-Medication: None   Induction:  IV (Standard)   Airway: ETT; Oral   Access/Monitoring: PIV   Maintenance: Balanced     Postop Plan:   Postop Pain: Opioids  Postop Sedation/Airway: Not planned  Disposition: Outpatient     PONV Management:   Adult Risk Factors:, Non-Smoker, Postop Opioids   Prevention: Ondansetron     CONSENT: Direct conversation   Plan and risks discussed with: Patient   Blood Products: Consent Deferred (Minimal Blood Loss)                PAC Discussion and Assessment    ASA Classification: 2  Case is suitable for: ASC  Anesthetic techniques and relevant risks discussed: GA  Invasive monitoring and risk discussed:   Types:   Possibility and Risk of blood transfusion discussed:   NPO instructions given:   Additional anesthetic preparation and risks discussed:   Needs early admission to pre-op area:   Other:     PAC Resident/NP Anesthesia Assessment:  Dion Thomson is a 48 year old male scheduled for an Image Guided Bilateral Functional Endoscopic Sinus Surgery (Bilateral Eye) Septoplasty on 9/30/2019 by Dr. Garcia in treatment of chronic sinusitis.  PAC referral for risk assessment and optimization for anesthesia with comorbid conditions of: sleep apnea, asthma, allergic rhinitis and obesity.     Pre-operative considerations:  1.  Cardiac:  Functional status- METS >4.  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  He has no known cardiac conditions and had a negative stress test in 2015.  Intermdediate risk surgery with 0.4% risk of major adverse cardiac event.   2.  Pulm:  Airway feasible.  He has sleep apnea and uses CPAP as directed.  Asthma is well controlled with symbicort.    3.  GI:  Risk of PONV score = 2.  If > 2,  anti-emetic intervention recommended.  4. Endo:  He is obese with a BMI >30.     VTE risk: 0.5%    Patient is optimized and is acceptable candidate for the proposed procedure.  No further diagnostic evaluation is needed.       **For further details of assessment, testing, and physical exam please see H and P completed on same date.          Riana Chopra DNP, RN, APRN      Reviewed and Signed by PAC Mid-Level Provider/Resident  Mid-Level Provider/Resident: Riana Chopra DNP, RN, APRN  Date: 9/26/2019  Time: 1736    Attending Anesthesiologist Anesthesia Assessment:        Anesthesiologist:   Date:   Time:   Pass/Fail:   Disposition:     PAC Pharmacist Assessment:        Pharmacist:   Date:   Time:    Riana Chopra, ENMA CNP

## 2019-09-26 NOTE — PATIENT INSTRUCTIONS
Preparing for Your Surgery      Name:  Dion Thomson   MRN:  [id number]   :  1970   Today's Date:  2019     Arriving for surgery:  Surgery date:  19  Arrival time:  12:15PM  Please come to:     [facility]  [address] [place] [id number]     Parking is available in front of the Clinics and Surgery Center building from 5:30AM to 8:00PM.  -  Proceed to the 5th floor to check into the Ambulatory Surgery Center.              >> There will be patient concierges on the 1st and 5th floor, for assistance or an escort, if you would like.              >> Please call [phone] with any questions.    What can I eat or drink?  -  You may have solid food or milk products until 8 hours prior to your surgery. 19, 4:45AM  -  You may have water, apple juice or 7up/Sprite until 2 hours prior to your surgery. 19, 11:45AM    Which medicines can I take?  Stop Aspirin, vitamins and supplements one week prior to surgery.  Hold Ibuprofen for 24 hours and/or Naproxen for 48 hours prior to surgery.   -  Do NOT take these medications in the morning, the day of surgery:    Vitamin D   Vitamin B    -  Please take these medications the day of surgery:    Atorvastatin(Lipitor)  Astelin Nasal spray    Doxycycline   Allegra     Flonase Nasal spray  Combivent inhaler    Omeprazole(Prilosec) Symbicort Inhaler  -As needed:    Acetaminophen  Albuterol Inhaler, use as needed and bring the day of surgery    How do I prepare myself?  -  Take two showers: one the night before surgery; and one the morning of surgery.         Use Scrubcare or Hibiclens to wash from neck down, leave soap on your skin for up to one minute.  Do not get soap in your eyes or ears.  You may use your own shampoo and conditioner; no other hair products.   -  Do NOT use lotion, powder, deodorant, or antiperspirant the day of your surgery.  -  Do NOT wear jewelry.  - Do not bring your own medications to the  hospital, except for inhalers and eye   drops.  -  Bring your ID and insurance card.    -If you are scheduled to go home the Same Day as surgery you must have a responsible adult as a  and to stay with you overnight the first 24 hours after surgery.     Questions or Concerns:  -If you are scheduled at the Ambulatory Surgery Center and have questions or concerns regarding the day of surgery please call [phone].    -For questions after surgery please call your surgeons office.

## 2019-09-26 NOTE — TELEPHONE ENCOUNTER
FUTURE VISIT INFORMATION      SURGERY INFORMATION:    Date: 19    Location: [facility] OR    Surgeon:  Willi Garcia    Anesthesia Type:  General    RECORDS REQUESTED FROM:       Primary Care Provider: Viral Ballesteros cory    Pertinent Medical History: URBANO    Most recent EKG+ Tracin/22/15    Most recent Cardiac Stress Test: 16

## 2019-09-26 NOTE — H&P
Pre-Operative H & P     CC:  Preoperative exam to assess for increased cardiopulmonary risk while undergoing surgery and anesthesia.    Date of Encounter: 9/26/2019  Primary Care Physician:  Viral Rivera  Dion Thomson is a 48 year old male who presents for pre-operative H & P in preparation for an Image Guided Bilateral Functional Endoscopic Sinus Surgery (Bilateral Eye) Septoplasty on 9/30/2019 by Dr. Garcia at [facility] [place].     Dion Thomson is a 48 year old male with sleep apnea, asthma, allergic rhinitis and obesity that has chronic sinusitis.  He had a noticeable asthma exacerbation about a year ago which seemed to be triggered by sinus symptoms.  Since then his sinus symptoms have been recurrent despite multiple courses of antibiotics and steroids.  He has consulted with Dr. Garcia and the above listed surgery has been recommended for treatment.     History is obtained from the patient and the medical record.     Past Medical History  Past Medical History:   Diagnosis Date     Allergic rhinitis      Anxiety      Chronic sinusitis      Chronic sinusitis      Gastroesophageal reflux disease      Hypercholesteremia      Intermittent asthma 12/20/2013     Obstructive sleep apnea      URBANO (obstructive sleep apnea) 12/19/2014       Past Surgical History  Past Surgical History:   Procedure Laterality Date     ARTHROSCOPIC RECONSTRUCTION ANTERIOR CRUCIATE LIGAMENT  1997    Open, Knee Left     ARTHROSCOPY KNEE RT/LT Left 04/2005    meniscus repair     EXTRACTION(S) DENTAL       TONSILLECTOMY & ADENOIDECTOMY  5 yo     VASECTOMY      Vasectomy       Hx of Blood transfusions/reactions: none     Hx of abnormal bleeding or anti-platelet use: none      Steroid use in the last year: 5 courses of prednisone in the last year for sinusitis.  Last dose 9/23/2019.  No long term steroids.      Personal or FH with difficulty with Anesthesia:  No known personal anesthesia complications.  Family  history of mother having a high tolerance to anesthesia.    Prior to Admission Medications  Current Outpatient Medications   Medication Sig Dispense Refill     acetaminophen (TYLENOL) 500 MG tablet Take 1,000 mg by mouth every 6 hours as needed for mild pain (Pt. last took approximately 1 week ago 09/26/2019)       albuterol (PROAIR HFA/PROVENTIL HFA/VENTOLIN HFA) 108 (90 Base) MCG/ACT inhaler Inhale 2 puffs into the lungs every 6 hours as needed for shortness of breath / dyspnea (Patient taking differently: Inhale 2 puffs into the lungs every 6 hours as needed for shortness of breath / dyspnea (Pt. last used months ago 09/26/2019) ) 1 Inhaler 4     atorvastatin (LIPITOR) 10 MG tablet TAKE ONE TABLET BY MOUTH ONE TIME DAILY (Patient taking differently: Take 10 mg by mouth every morning ) 90 tablet 1     azelastine (ASTELIN) 0.1 % nasal spray Spray 1 spray into both nostrils 2 times daily 30 mL 2     Cholecalciferol (VITAMIN D) 2000 UNITS tablet Take 2,000 Units by mouth every morning  100 tablet 3     doxycycline monohydrate (MONODOX) 100 MG capsule Take 1 capsule (100 mg) by mouth 2 times daily for 21 days (Patient taking differently: Take 100 mg by mouth 2 times daily Pt. Will complete treatment on 09/30/2019) 42 capsule 0     fexofenadine (ALLEGRA ALLERGY) 60 MG tablet Take 60 mg by mouth every morning Pt. Last took 09/24/2019       fluticasone (FLONASE) 50 MCG/ACT nasal spray USE TWO SPRAYS IN EACH NOSTRIL DAILY (Patient taking differently: Spray 2 sprays into both nostrils 2 times daily ) 16 mL 0     Ipratropium-Albuterol (COMBIVENT RESPIMAT)  MCG/ACT inhaler Inhale 1 puff into the lungs 4 times daily Not to exceed 6 doses per day. (Patient taking differently: Inhale 1 puff into the lungs 4 times daily Not to exceed 6 doses per day.    Pt. Has not used in 1 week 09/26/2019) 1 Inhaler 1     naproxen sodium 220 MG capsule Take 220 mg by mouth as needed (Pt. last used 09/24/2019)       omeprazole (PRILOSEC)  20 MG CR capsule Take 1 capsule (20 mg) by mouth every morning (before breakfast) 90 capsule 3     SYMBICORT 160-4.5 MCG/ACT Inhaler Inhale 2 puffs into the lungs 2 times daily        vitamin  B complex with vitamin C (VITAMIN  B COMPLEX) TABS Take 1 tablet by mouth every morning          Allergies  Allergies   Allergen Reactions     No Clinical Screening - See Comments Hives     Pain medication - unknown - from 1st knee surgery       Social History  Social History     Socioeconomic History     Marital status:      Spouse name: Lissette     Number of children: 2     Years of education: Not on file     Highest education level: Not on file   Occupational History     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     Employer: SELF   Social Needs     Financial resource strain: Not on file     Food insecurity:     Worry: Not on file     Inability: Not on file     Transportation needs:     Medical: Not on file     Non-medical: Not on file   Tobacco Use     Smoking status: Never Smoker     Smokeless tobacco: Never Used   Substance and Sexual Activity     Alcohol use: Yes     Alcohol/week: 0.0 - 0.8 standard drinks     Comment: Occasional     Drug use: No     Sexual activity: Yes     Partners: Female     Birth control/protection: Pill, Male Surgical   Lifestyle     Physical activity:     Days per week: Not on file     Minutes per session: Not on file     Stress: Not on file   Relationships     Social connections:     Talks on phone: Not on file     Gets together: Not on file     Attends Oriental orthodox service: Not on file     Active member of club or organization: Not on file     Attends meetings of clubs or organizations: Not on file     Relationship status: Not on file     Intimate partner violence:     Fear of current or ex partner: Not on file     Emotionally abused: Not on file     Physically abused: Not on file     Forced sexual activity: Not on file   Other Topics Concern     Parent/sibling w/ CABG, MI or angioplasty before 65F 55M? Not  Asked   Social History Narrative     Not on file       Family History  Family History   Problem Relation Age of Onset     Coronary Artery Disease Father      Myocardial Infarction Father      Diabetes Mother      Bipolar Disorder Mother      Anesthesia Reaction Mother         high tolerance     Gout Brother      Hyperlipidemia Brother      No Known Problems Daughter      No Known Problems Daughter      Cancer Other         multiple, both sides     Heart Disease Other      Diabetes Maternal Grandmother      Myocardial Infarction Maternal Grandfather      Coronary Artery Disease Early Onset Maternal Grandfather      Diabetes Maternal Aunt      Mental Illness Maternal Aunt      Unknown/Adopted Paternal Grandmother      Liver Cancer Paternal Grandfather                ROS/MED HX  The complete review of systems is negative other than noted in the HPI or here.   ENT/Pulmonary:     (+)sleep apnea, allergic rhinitis, Mild Persistent asthma Last exacerbation: 8/2018,Treatment: Inhaled steroids,  uses CPAP , . .    Neurologic:  - neg neurologic ROS     Cardiovascular:     (+) ----. : . . . :. . Previous cardiac testing date:results:Stress Testdate:2015 results:negativeECG reviewed date:2015 results:2015 date: results:          METS/Exercise Tolerance:  >4 METS   Hematologic:  - neg hematologic  ROS       Musculoskeletal:   (+)  other musculoskeletal- intermittent low back pain      GI/Hepatic:     (+) GERD Asymptomatic on medication,       Renal/Genitourinary:  - ROS Renal section negative       Endo:     (+) Obesity, .   (-) chronic steroid usage   Psychiatric:     (+) psychiatric history anxiety      Infectious Disease:  - neg infectious disease ROS       Malignancy:      - no malignancy   Other:    (+) no H/O Chronic Pain,                       PHYSICAL EXAM:   Mental Status/Neuro: A/A/O   Airway: Facies: Feasible  Mallampati: I  Mouth/Opening: Full  TM distance: > 6 cm  Neck ROM: Full   Respiratory: Auscultation: CTAB    "  Resp. Rate: Normal     Resp. Effort: Normal      CV: Rhythm: Regular  Rate: Age appropriate  Heart: Normal Sounds  Edema: None   Comments: Tooth #9 with enamel/veneer covering     Dental: Details                  Temp: 98  F (36.7  C) Temp src: Oral BP: 127/77 Pulse: 73   Resp: 18 SpO2: 96 %         220 lbs 0 oz  5' 9\"   Body mass index is 32.49 kg/m .       Physical Exam  Constitutional: Awake, alert, cooperative, no apparent distress, and appears stated age. obese  Eyes: Pupils equal, round and reactive to light, extra ocular muscles intact, sclera clear, conjunctiva normal.  HENT: Normocephalic, oral pharynx with moist mucus membranes, good dentition. No goiter appreciated.   Respiratory: Clear to auscultation bilaterally, no crackles or wheezing.  Cardiovascular: Regular rate and rhythm, normal S1 and S2, and no murmur noted.  Carotids +2, no bruits. No edema. Palpable pulses to radial  DP and PT arteries.   GI: Normal bowel sounds, soft, non-distended, non-tender, no masses palpated, no hepatosplenomegaly.    Lymph/Hematologic: No cervical lymphadenopathy and no supraclavicular lymphadenopathy.  Genitourinary:  deferred  Skin: Warm and dry.  No rashes at anticipated surgical site.   Musculoskeletal: Full ROM of neck. There is no redness, warmth, or swelling of the exposed joints. Gross motor strength is normal.    Neurologic: Awake, alert, oriented to name, place and time. Cranial nerves II-XII are grossly intact. Gait is normal.   Neuropsychiatric: Calm, cooperative. Normal affect.     Labs: (personally reviewed)   Component      Latest Ref Rng & Units 2019   Creatinine      0.66 - 1.25 mg/dL 0.82   GFR Estimate      >60 mL/min/1.73:m2 >90   GFR Estimate If Black      >60 mL/min/1.73:m2 >90   Potassium      3.4 - 5.3 mmol/L 3.6   Hemoglobin      13.3 - 17.7 g/dL 15.3         EK  NSR      Stress test:   Interpretation Summary  This was essentially a normal study.  No exertional chest " discomfort. No echocardiographic evidence of ischemia /   infarction.          ASSESSMENT and PLAN  Dion Thosmon is a 48 year old male scheduled for an Image Guided Bilateral Functional Endoscopic Sinus Surgery (Bilateral Eye) Septoplasty on 9/30/2019 by Dr. Garcia in treatment of chronic sinusitis.  PAC referral for risk assessment and optimization for anesthesia with comorbid conditions of: sleep apnea, asthma, allergic rhinitis and obesity.     Pre-operative considerations:  1.  Cardiac:  Functional status- METS >4.  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  He has no known cardiac conditions and had a negative stress test in 2015.  Intermdediate risk surgery with 0.4% risk of major adverse cardiac event.   2.  Pulm:  Airway feasible.  He has sleep apnea and uses CPAP as directed.  Asthma is well controlled with symbicort.    3.  GI:  Risk of PONV score = 2.  If > 2, anti-emetic intervention recommended.  4. Endo:  He is obese with a BMI >30.     VTE risk: 0.5%    Patient is optimized and is acceptable candidate for the proposed procedure.  No further diagnostic evaluation is needed.       Riana Chopra DNP, RN, APRN  Preoperative Assessment Center  [facility]  Clinic and [facility] Center  Phone: [phone]  Fax: [phone]

## 2019-09-27 RX ORDER — DOXYCYCLINE 100 MG/1
100 CAPSULE ORAL 2 TIMES DAILY
Qty: 42 CAPSULE | Refills: 0 | OUTPATIENT
Start: 2019-09-27

## 2019-09-27 NOTE — RESULT ENCOUNTER NOTE
Manjula Ashley,    Your test results are attached. Your labs are all within normal limits and good for surgery.         Riana hCopra DNP, RN, ANP-C

## 2019-09-28 DIAGNOSIS — K21.9 GASTROESOPHAGEAL REFLUX DISEASE WITHOUT ESOPHAGITIS: ICD-10-CM

## 2019-09-30 ENCOUNTER — ANESTHESIA (OUTPATIENT)
Dept: SURGERY | Facility: AMBULATORY SURGERY CENTER | Age: 49
End: 2019-09-30

## 2019-09-30 ENCOUNTER — HOSPITAL ENCOUNTER (OUTPATIENT)
Facility: AMBULATORY SURGERY CENTER | Age: 49
End: 2019-09-30
Attending: OTOLARYNGOLOGY
Payer: COMMERCIAL

## 2019-09-30 VITALS
OXYGEN SATURATION: 94 % | DIASTOLIC BLOOD PRESSURE: 86 MMHG | RESPIRATION RATE: 14 BRPM | SYSTOLIC BLOOD PRESSURE: 135 MMHG | TEMPERATURE: 98.3 F | HEART RATE: 81 BPM

## 2019-09-30 DIAGNOSIS — Z98.890 S/P FESS (FUNCTIONAL ENDOSCOPIC SINUS SURGERY): Primary | ICD-10-CM

## 2019-09-30 DEVICE — IMP SINUS PROPEL MINI MOMETASONE FUORATE 370MCCG 16MM 60011: Type: IMPLANTABLE DEVICE | Site: SINUS | Status: FUNCTIONAL

## 2019-09-30 DEVICE — IMP SINUS PROPEL MOMETASONE FUORATE 370MCCG 23MM 70011: Type: IMPLANTABLE DEVICE | Site: SINUS | Status: FUNCTIONAL

## 2019-09-30 RX ORDER — OXYCODONE HYDROCHLORIDE 5 MG/1
10 TABLET ORAL ONCE
Status: COMPLETED | OUTPATIENT
Start: 2019-09-30 | End: 2019-09-30

## 2019-09-30 RX ORDER — ONDANSETRON 4 MG/1
4 TABLET, ORALLY DISINTEGRATING ORAL EVERY 30 MIN PRN
Status: DISCONTINUED | OUTPATIENT
Start: 2019-09-30 | End: 2019-10-01 | Stop reason: HOSPADM

## 2019-09-30 RX ORDER — FENTANYL CITRATE 50 UG/ML
25-50 INJECTION, SOLUTION INTRAMUSCULAR; INTRAVENOUS
Status: DISCONTINUED | OUTPATIENT
Start: 2019-09-30 | End: 2019-10-01 | Stop reason: HOSPADM

## 2019-09-30 RX ORDER — ONDANSETRON 4 MG/1
4 TABLET, ORALLY DISINTEGRATING ORAL
Status: DISCONTINUED | OUTPATIENT
Start: 2019-09-30 | End: 2019-10-01 | Stop reason: HOSPADM

## 2019-09-30 RX ORDER — ECHINACEA PURPUREA EXTRACT 125 MG
2 TABLET ORAL 2 TIMES DAILY
Qty: 88 ML | Refills: 11 | Status: SHIPPED | OUTPATIENT
Start: 2019-09-30 | End: 2019-10-30

## 2019-09-30 RX ORDER — ACETAMINOPHEN 325 MG/1
975 TABLET ORAL ONCE
Status: COMPLETED | OUTPATIENT
Start: 2019-09-30 | End: 2019-09-30

## 2019-09-30 RX ORDER — ONDANSETRON 2 MG/ML
4 INJECTION INTRAMUSCULAR; INTRAVENOUS EVERY 30 MIN PRN
Status: DISCONTINUED | OUTPATIENT
Start: 2019-09-30 | End: 2019-10-01 | Stop reason: HOSPADM

## 2019-09-30 RX ORDER — NALOXONE HYDROCHLORIDE 0.4 MG/ML
.1-.4 INJECTION, SOLUTION INTRAMUSCULAR; INTRAVENOUS; SUBCUTANEOUS
Status: DISCONTINUED | OUTPATIENT
Start: 2019-09-30 | End: 2019-10-01 | Stop reason: HOSPADM

## 2019-09-30 RX ORDER — ONDANSETRON 2 MG/ML
INJECTION INTRAMUSCULAR; INTRAVENOUS PRN
Status: DISCONTINUED | OUTPATIENT
Start: 2019-09-30 | End: 2019-09-30

## 2019-09-30 RX ORDER — LIDOCAINE 40 MG/G
CREAM TOPICAL
Status: DISCONTINUED | OUTPATIENT
Start: 2019-09-30 | End: 2019-09-30 | Stop reason: HOSPADM

## 2019-09-30 RX ORDER — OXYCODONE AND ACETAMINOPHEN 5; 325 MG/1; MG/1
1-2 TABLET ORAL EVERY 4 HOURS PRN
Qty: 20 TABLET | Refills: 0 | Status: SHIPPED | OUTPATIENT
Start: 2019-09-30 | End: 2019-10-14

## 2019-09-30 RX ORDER — LIDOCAINE HYDROCHLORIDE 20 MG/ML
INJECTION, SOLUTION INFILTRATION; PERINEURAL PRN
Status: DISCONTINUED | OUTPATIENT
Start: 2019-09-30 | End: 2019-09-30

## 2019-09-30 RX ORDER — GABAPENTIN 300 MG/1
300 CAPSULE ORAL ONCE
Status: COMPLETED | OUTPATIENT
Start: 2019-09-30 | End: 2019-09-30

## 2019-09-30 RX ORDER — FENTANYL CITRATE 50 UG/ML
INJECTION, SOLUTION INTRAMUSCULAR; INTRAVENOUS PRN
Status: DISCONTINUED | OUTPATIENT
Start: 2019-09-30 | End: 2019-09-30

## 2019-09-30 RX ORDER — SODIUM CHLORIDE, SODIUM LACTATE, POTASSIUM CHLORIDE, CALCIUM CHLORIDE 600; 310; 30; 20 MG/100ML; MG/100ML; MG/100ML; MG/100ML
INJECTION, SOLUTION INTRAVENOUS CONTINUOUS
Status: DISCONTINUED | OUTPATIENT
Start: 2019-09-30 | End: 2019-09-30 | Stop reason: HOSPADM

## 2019-09-30 RX ORDER — DEXAMETHASONE SODIUM PHOSPHATE 4 MG/ML
INJECTION, SOLUTION INTRA-ARTICULAR; INTRALESIONAL; INTRAMUSCULAR; INTRAVENOUS; SOFT TISSUE PRN
Status: DISCONTINUED | OUTPATIENT
Start: 2019-09-30 | End: 2019-09-30

## 2019-09-30 RX ORDER — OXYMETAZOLINE HYDROCHLORIDE 0.05 G/100ML
SPRAY NASAL PRN
Status: DISCONTINUED | OUTPATIENT
Start: 2019-09-30 | End: 2019-09-30 | Stop reason: HOSPADM

## 2019-09-30 RX ORDER — LIDOCAINE HYDROCHLORIDE AND EPINEPHRINE 10; 10 MG/ML; UG/ML
INJECTION, SOLUTION INFILTRATION; PERINEURAL PRN
Status: DISCONTINUED | OUTPATIENT
Start: 2019-09-30 | End: 2019-09-30 | Stop reason: HOSPADM

## 2019-09-30 RX ORDER — PROPOFOL 10 MG/ML
INJECTION, EMULSION INTRAVENOUS CONTINUOUS PRN
Status: DISCONTINUED | OUTPATIENT
Start: 2019-09-30 | End: 2019-09-30

## 2019-09-30 RX ORDER — OXYCODONE AND ACETAMINOPHEN 5; 325 MG/1; MG/1
2 TABLET ORAL
Status: DISCONTINUED | OUTPATIENT
Start: 2019-09-30 | End: 2019-10-01 | Stop reason: HOSPADM

## 2019-09-30 RX ORDER — MEPERIDINE HYDROCHLORIDE 25 MG/ML
12.5 INJECTION INTRAMUSCULAR; INTRAVENOUS; SUBCUTANEOUS
Status: DISCONTINUED | OUTPATIENT
Start: 2019-09-30 | End: 2019-10-01 | Stop reason: HOSPADM

## 2019-09-30 RX ORDER — PROPOFOL 10 MG/ML
INJECTION, EMULSION INTRAVENOUS PRN
Status: DISCONTINUED | OUTPATIENT
Start: 2019-09-30 | End: 2019-09-30

## 2019-09-30 RX ORDER — SODIUM CHLORIDE, SODIUM LACTATE, POTASSIUM CHLORIDE, CALCIUM CHLORIDE 600; 310; 30; 20 MG/100ML; MG/100ML; MG/100ML; MG/100ML
INJECTION, SOLUTION INTRAVENOUS CONTINUOUS
Status: DISCONTINUED | OUTPATIENT
Start: 2019-09-30 | End: 2019-10-01 | Stop reason: HOSPADM

## 2019-09-30 RX ADMIN — PROPOFOL: 10 INJECTION, EMULSION INTRAVENOUS at 14:19

## 2019-09-30 RX ADMIN — GABAPENTIN 300 MG: 300 CAPSULE ORAL at 12:53

## 2019-09-30 RX ADMIN — PROPOFOL 200 MCG/KG/MIN: 10 INJECTION, EMULSION INTRAVENOUS at 13:30

## 2019-09-30 RX ADMIN — PROPOFOL: 10 INJECTION, EMULSION INTRAVENOUS at 13:53

## 2019-09-30 RX ADMIN — OXYCODONE HYDROCHLORIDE 10 MG: 5 TABLET ORAL at 15:07

## 2019-09-30 RX ADMIN — PROPOFOL 200 MG: 10 INJECTION, EMULSION INTRAVENOUS at 13:30

## 2019-09-30 RX ADMIN — Medication 50 MG: at 13:30

## 2019-09-30 RX ADMIN — FENTANYL CITRATE 25 MCG: 50 INJECTION, SOLUTION INTRAMUSCULAR; INTRAVENOUS at 15:28

## 2019-09-30 RX ADMIN — FENTANYL CITRATE 50 MCG: 50 INJECTION, SOLUTION INTRAMUSCULAR; INTRAVENOUS at 15:04

## 2019-09-30 RX ADMIN — FENTANYL CITRATE 25 MCG: 50 INJECTION, SOLUTION INTRAMUSCULAR; INTRAVENOUS at 15:17

## 2019-09-30 RX ADMIN — Medication 0.5 MG: at 13:49

## 2019-09-30 RX ADMIN — ONDANSETRON 4 MG: 2 INJECTION INTRAMUSCULAR; INTRAVENOUS at 13:30

## 2019-09-30 RX ADMIN — FENTANYL CITRATE 100 MCG: 50 INJECTION, SOLUTION INTRAMUSCULAR; INTRAVENOUS at 13:30

## 2019-09-30 RX ADMIN — DEXAMETHASONE SODIUM PHOSPHATE 4 MG: 4 INJECTION, SOLUTION INTRA-ARTICULAR; INTRALESIONAL; INTRAMUSCULAR; INTRAVENOUS; SOFT TISSUE at 13:30

## 2019-09-30 RX ADMIN — LIDOCAINE HYDROCHLORIDE 100 MG: 20 INJECTION, SOLUTION INFILTRATION; PERINEURAL at 13:30

## 2019-09-30 RX ADMIN — SODIUM CHLORIDE, SODIUM LACTATE, POTASSIUM CHLORIDE, CALCIUM CHLORIDE: 600; 310; 30; 20 INJECTION, SOLUTION INTRAVENOUS at 12:59

## 2019-09-30 RX ADMIN — ACETAMINOPHEN 975 MG: 325 TABLET ORAL at 12:53

## 2019-09-30 RX ADMIN — ONDANSETRON 4 MG: 4 TABLET, ORALLY DISINTEGRATING ORAL at 16:09

## 2019-09-30 NOTE — DISCHARGE INSTRUCTIONS
[facility]  Home Care Following Anesthesia  For 24 hours after surgery:  1. Get plenty of rest.  A responsible adult must stay with you for at least 24 hours after you leave the surgery center.  2. Do not drive or use heavy equipment.  If you have weakness or tingling, don't drive or use heavy equipment until this feeling goes away.   3. Do not drink alcohol.   4. Avoid strenuous or risky activities.  Ask for help when climbing stairs.  5. You may feel lightheaded.  IF so, sit for a few minutes before standing.  Have someone help you get up.   6. If you have nausea (feel sick to your stomach): Drink only clear liquids such as apple juice, ginger ale, broth or 7-Up.  Rest may also help.  Be sure to drink enough fluids.  Move to a regular diet as you feel able.   7. You may have a slight fever.  Call the doctor if your fever is over 100 F (37.7 C) (taken under the tongue) or lasts longer than 24 hours.  8. You may have a dry mouth, a sore throat, muscle aches or trouble sleeping. These should go away after 24 hours.  9. Do not make important or legal decisions.       Tips for taking pain medications  To get the best pain relief possible, remember these points:    Take pain medications as directed, before pain becomes severe.    Pain medication can upset your stomach: taking it with food may help.    Constipation is a common side effect of pain medication. Drink plenty of  fluids.    Eat foods high in fiber. Take a stool softener if recommended by your doctor or pharmacist.    Do not drink alcohol, drive or operate machinery while taking pain medications.    Ask about other ways to control pain, such as with heat, ice or relaxation.    Tylenol/Acetaminophen Consumption  To help encourage the safe use of acetaminophen, the makers of TYLENOL  have lowered the maximum daily dose for single-ingredient Extra Strength TYLENOL  (acetaminophen) products sold in the [place] from 8 pills per  day (4,000 mg) to 6 pills per day (3,000 mg). The dosing interval has also changed from 2 pills every 4-6 hours to 2 pills every 6 hours.    If you feel your pain relief is insufficient, you may take Tylenol/Acetaminophen in addition to your narcotic pain medication.     Be careful not to exceed 3,000 mg of Tylenol/Acetaminophen in a 24 hour period from all sources.    If you are taking extra strength Tylenol/acetaminophen (500 mg), the maximum dose is 6 tablets in 24 hours.    If you are taking regular strength acetaminophen (325 mg), the maximum dose is 9 tablets in 24 hours.  Last dose of Tylenol:  975 mg at 12:53 pm.  Next dose after 6:53 pm, if needed.  Follow package instructions.    Call a doctor for any of the followin. Signs of infection (fever, growing tenderness at the surgery site, a large amount of drainage or bleeding, severe pain, foul-smelling drainage, redness, swelling).  2. It has been over 8 to 10 hours since surgery and you are still not able to urinate (pass water).  3. Headache for over 24 hours.  Your doctor is:  Dr. Willi Garcia, ENT Otolaryngology: [phone]                  Or dial [phone] and ask for the resident on call for:  ENT Otolaryngology  For emergency care, call the:  [place] [facility]:  [phone] (TTY for hearing impaired: [phone])

## 2019-09-30 NOTE — ANESTHESIA POSTPROCEDURE EVALUATION
Anesthesia POST Procedure Evaluation    Patient: Dion Thomson   MRN:     [id number] Gender:   male   Age:    49 year old :      1970        Preoperative Diagnosis: Chronic Sinusitis   Procedure(s):  Image Guided Bilateral Functional Endoscopic Sinus Surgery, Bilateral turbinate reduction   Postop Comments: No value filed.       Anesthesia Type:  Not documented  General    Reportable Event: NO     PAIN: Uncomplicated   Sign Out status: Comfortable, Well controlled pain     PONV: No PONV   Sign Out status:  No Nausea or Vomiting     Neuro/Psych: Uneventful perioperative course   Sign Out Status: Preoperative baseline; Age appropriate mentation     Airway/Resp.: Uneventful perioperative course   Sign Out Status: Non labored breathing, age appropriate RR; Resp. Status within EXPECTED Parameters     CV: Uneventful perioperative course   Sign Out status: Appropriate BP and perfusion indices; Appropriate HR/Rhythm     Disposition:   Sign Out in:  PACU  Disposition:  Phase II; Home  Recovery Course: Uneventful  Follow-Up: Not required           Last Anesthesia Record Vitals:  CRNA VITALS  2019 1417 - 2019 1517      2019             Pulse:  85    SpO2:  95 %    Resp Rate (set):  10          Last PACU Vitals:  Vitals Value Taken Time   /87 2019  3:36 PM   Temp 36.8  C (98.3  F) 2019  3:36 PM   Pulse 75 2019  3:36 PM   Resp 6 2019  3:37 PM   SpO2 90 % 2019  3:37 PM   Temp src     NIBP     Pulse     SpO2     Resp     Temp     Ht Rate     Temp 2     Vitals shown include unvalidated device data.      Electronically Signed By: Michael Chris MD, 2019, 4:41 PM

## 2019-09-30 NOTE — BRIEF OP NOTE
[facility] Brief Operative Note    Pre-operative diagnosis: Chronic Sinusitis   Post-operative diagnosis * No post-op diagnosis entered *     Procedure: Procedure(s):  Image Guided Bilateral Functional Endoscopic Sinus Surgery, Bilateral turbinate reduction   Surgeon(s): Surgeon(s) and Role:     * Willi Garcia MD - Primary   Estimated blood loss: * No values recorded between 9/30/2019  1:41 PM and 9/30/2019  2:47 PM *    Specimens: ID Type Source Tests Collected by Time Destination   A : Left sinus contents Tissue Sinus Contents, Left SURGICAL PATHOLOGY EXAM Willi Garcia MD 9/30/2019  1:58 PM    B : Right sinus contents Tissue Sinus Contents, Right SURGICAL PATHOLOGY EXAM Willi Garcia MD 9/30/2019  1:58 PM       Findings:

## 2019-09-30 NOTE — ANESTHESIA CARE TRANSFER NOTE
Patient: Dion Thomson    Procedure(s):  Image Guided Bilateral Functional Endoscopic Sinus Surgery, Bilateral turbinate reduction    Diagnosis: Chronic Sinusitis  Diagnosis Additional Information: No value filed.    Anesthesia Type:   General     Note:  Airway :Face Mask  Patient transferred to:PACU  Handoff Report: Identifed the Patient, Identified the Reponsible Provider, Reviewed the pertinent medical history, Discussed the surgical course, Reviewed Intra-OP anesthesia mangement and issues during anesthesia, Set expectations for post-procedure period and Allowed opportunity for questions and acknowledgement of understanding      Vitals: (Last set prior to Anesthesia Care Transfer)    CRNA VITALS  9/30/2019 1417 - 9/30/2019 1453      9/30/2019             Pulse:  85    SpO2:  95 %    Resp Rate (set):  10                Electronically Signed By: ENMA Nick CRNA  September 30, 2019  2:53 PM

## 2019-10-01 LAB — COPATH REPORT: NORMAL

## 2019-10-01 NOTE — OP NOTE
Procedure Date: 09/30/2019      OPERATING SURGEON:  Willi Garcia MD      ANESTHESIA:  General endotracheal.      COMPLICATIONS:  No immediate.      PREOPERATIVE DIAGNOSIS:  Chronic pansinusitis and nasal polyposis and nasal turbinate hypertrophy.      POSTOPERATIVE DIAGNOSIS:  Chronic pansinusitis and nasal polyposis and nasal turbinate hypertrophy.      PROCEDURES:     1.  Bilateral endoscopic sinus surgery, image guided, including bilateral maxillary antrostomy, bilateral anterior ethmoidectomy, bilateral frontal sinusotomy.   2.  Bilateral submucosal resection of the inferior turbinate.      ESTIMATED BLOOD LOSS:  50 mL      INDICATIONS:  This is a 49-year-old gentleman with long-standing nasal congestion, recurrent sinusitis and hyposmia seen in the Otolaryngology Clinic.  Attempts at medical treatment were unsuccessful, and the above-mentioned procedure was planned.      DESCRIPTION OF PROCEDURE:  After informed consent was obtained, the patient was brought back in the operating room and placed in a supine position on the operating table.  After adequate anesthesia, he was intubated.  He was then prepped and draped in a standard fashion.  A time out was taken.  The Medtronic Stealth system was then set up and used for image guidance during the procedure.  Under endoscopic visualization, both sides of the nose were injected with 1% lidocaine with 1:100,000 epinephrine at standard locations.  Attention was then turned to the left side of the nose, where an uncinectomy was performed under endoscopic visualization.  A large maxillary antrostomy was performed.  Dissection was taken back to the basal lamella up to the skull base anteriorly to remove all agger nasi cells.  At that point, the frontal recess was identified and opened of all debris, and the frontal sinus was widely patent.  At that point, a Propel stent was placed into the frontal sinus, and a Propel stent was placed into the middle meatus.  The identical  procedure was then performed on the right side of the nose, including uncinectomy, maxillary antrostomy, anterior ethmoidectomy and frontal sinusotomy.  Again, Propel stents were placed in the frontal recess and middle meatus on the right side.  At that point, a stab incision was made on the right mid inferior turbinate, a submucosal pocket was developed, and using the Space Monkey inferior turbinate shaver blade, a submucosal resection was performed of the right inferior turbinate including both soft tissue and bone.  The identical procedure was then performed on the left inferior turbinate.  Both inferior turbinates were then outfractured.  At that point, the patient was awakened, extubated in the operating room and taken to the PACU in stable condition.         LUIS LEY MD             D: 2019   T: 10/01/2019   MT: michele      Name:     KRYSTLE REHMAN   MRN:      [id number]        Account:        [id number]   :      1970           Procedure Date: 2019      Document: [id number]

## 2019-10-07 ENCOUNTER — OFFICE VISIT (OUTPATIENT)
Dept: OTOLARYNGOLOGY | Facility: CLINIC | Age: 49
End: 2019-10-07
Payer: COMMERCIAL

## 2019-10-07 DIAGNOSIS — Z98.890 S/P FESS (FUNCTIONAL ENDOSCOPIC SINUS SURGERY): Primary | ICD-10-CM

## 2019-10-07 ASSESSMENT — PAIN SCALES - GENERAL: PAINLEVEL: NO PAIN (0)

## 2019-10-07 NOTE — PATIENT INSTRUCTIONS
1. You were seen in the ENT Clinic today by  .  If you have any questions or concerns after your appointment, please call   - Option 1: ENT Clinic: [phone]    2.   Plan to return to clinic in one week.     UZAIR Fall  [facility]- Otolaryngology

## 2019-10-07 NOTE — LETTER
10/7/2019       RE: Dion Thomson  [address] [place] [address]     Dear Colleague,    Thank you for referring your patient, Dion Thomson, to the [facility] EAR NOSE [facility] THROAT at [facility]. Please see a copy of my visit note below.    HISTORY OF PRESENT ILLNESS:  Mr. Thomson is back to see us today.  He underwent endoscopic sinus surgery, bilateral inferior turbinate reduction last week.  He has been somewhat congested and somewhat swollen.  He did have to stop his antibiotics a couple days ago secondary to GI upset.  Otherwise, no substantial bleeding.      PHYSICAL EXAMINATION:  This is a 49-year-old gentleman in no distress, alert and appropriate.  Breathing without difficulty or stridor.  Eyes are anicteric.  Skin of the head and neck appears normal.  Examination of the nose shows moderate inferior turbinate hypertrophy bilaterally.  Nose is cleaned of any debris under endoscopic visualization after it is sprayed with lidocaine and Afrin.  Things appear to be healing appropriately.      PLAN:  Plan will be for followup in one week and to continue nasal hydration.         Again, thank you for allowing me to participate in the care of your patient.      Sincerely,    Willi Garcia MD

## 2019-10-07 NOTE — PROGRESS NOTES
HISTORY OF PRESENT ILLNESS:  Mr. Thomson is back to see us today.  He underwent endoscopic sinus surgery, bilateral inferior turbinate reduction last week.  He has been somewhat congested and somewhat swollen.  He did have to stop his antibiotics a couple days ago secondary to GI upset.  Otherwise, no substantial bleeding.      PHYSICAL EXAMINATION:  This is a 49-year-old gentleman in no distress, alert and appropriate.  Breathing without difficulty or stridor.  Eyes are anicteric.  Skin of the head and neck appears normal.  Examination of the nose shows moderate inferior turbinate hypertrophy bilaterally.  Nose is cleaned of any debris under endoscopic visualization after it is sprayed with lidocaine and Afrin.  Things appear to be healing appropriately.      PLAN:  Plan will be for followup in one week and to continue nasal hydration.

## 2019-10-14 ENCOUNTER — OFFICE VISIT (OUTPATIENT)
Dept: OTOLARYNGOLOGY | Facility: CLINIC | Age: 49
End: 2019-10-14
Payer: COMMERCIAL

## 2019-10-14 VITALS
HEART RATE: 80 BPM | WEIGHT: 218 LBS | RESPIRATION RATE: 16 BRPM | BODY MASS INDEX: 32.29 KG/M2 | HEIGHT: 69 IN | SYSTOLIC BLOOD PRESSURE: 114 MMHG | DIASTOLIC BLOOD PRESSURE: 71 MMHG

## 2019-10-14 DIAGNOSIS — Z98.890 S/P FESS (FUNCTIONAL ENDOSCOPIC SINUS SURGERY): Primary | ICD-10-CM

## 2019-10-14 RX ORDER — PREDNISONE 20 MG/1
40 TABLET ORAL DAILY
Qty: 10 TABLET | Refills: 0 | Status: SHIPPED | OUTPATIENT
Start: 2019-10-14 | End: 2019-10-21

## 2019-10-14 ASSESSMENT — PAIN SCALES - GENERAL: PAINLEVEL: NO PAIN (0)

## 2019-10-14 ASSESSMENT — MIFFLIN-ST. JEOR: SCORE: 1858.83

## 2019-10-14 NOTE — NURSING NOTE
"Chief Complaint   Patient presents with     RECHECK     1 week follow up      Blood pressure 114/71, pulse 80, resp. rate 16, height 1.76 m (5' 9.29\"), weight 98.9 kg (218 lb).    Jurgen Banks LPN    "

## 2019-10-14 NOTE — LETTER
10/14/2019       RE: Dion Thomson  [address] [facility] [address] [facility] [address]     Dear Colleague,    Thank you for referring your patient, Dion Thomson, to the [facility] EAR NOSE AND THROAT at [facility]. Please see a copy of my visit note below.    HISTORY OF PRESENT ILLNESS:  The patient is back to see us.  He continues to have a sense of congestion and swelling in his nose.  He has diminished rhinorrhea and has no pain or pressure, no epistaxis.      PHYSICAL EXAMINATION:  The patient is in no distress, alert, interactive, breathing without difficulty or stridor.          PROCEDURE:  Nose is sprayed with lidocaine and Afrin.  Nasal endoscopy is performed.  The patient continues to have some moderate inferior turbinate hypertrophy.  Posterior to that, the middle meatus and middle turbinate appear to be healing appropriately.  There are Propel stents still in place.      ASSESSMENT AND PLAN:  A 49-year-old status post FESS, still with some moderate amount of turbinate swelling.  We will plan on a course of antibiotics and prednisone at this point and come back in another 1-2 weeks.         Again, thank you for allowing me to participate in the care of your patient.      Sincerely,    Willi Garcia MD

## 2019-10-14 NOTE — PROGRESS NOTES
HISTORY OF PRESENT ILLNESS:  The patient is back to see us.  He continues to have a sense of congestion and swelling in his nose.  He has diminished rhinorrhea and has no pain or pressure, no epistaxis.      PHYSICAL EXAMINATION:  The patient is in no distress, alert, interactive, breathing without difficulty or stridor.          PROCEDURE:  Nose is sprayed with lidocaine and Afrin.  Nasal endoscopy is performed.  The patient continues to have some moderate inferior turbinate hypertrophy.  Posterior to that, the middle meatus and middle turbinate appear to be healing appropriately.  There are Propel stents still in place.      ASSESSMENT AND PLAN:  A 49-year-old status post FESS, still with some moderate amount of turbinate swelling.  We will plan on a course of antibiotics and prednisone at this point and come back in another 1-2 weeks.

## 2019-10-21 ENCOUNTER — OFFICE VISIT (OUTPATIENT)
Dept: OTOLARYNGOLOGY | Facility: CLINIC | Age: 49
End: 2019-10-21
Payer: COMMERCIAL

## 2019-10-21 VITALS
DIASTOLIC BLOOD PRESSURE: 77 MMHG | HEART RATE: 86 BPM | HEIGHT: 70 IN | BODY MASS INDEX: 31.07 KG/M2 | SYSTOLIC BLOOD PRESSURE: 125 MMHG | RESPIRATION RATE: 18 BRPM | WEIGHT: 217 LBS

## 2019-10-21 DIAGNOSIS — Z98.890 S/P FESS (FUNCTIONAL ENDOSCOPIC SINUS SURGERY): Primary | ICD-10-CM

## 2019-10-21 ASSESSMENT — MIFFLIN-ST. JEOR: SCORE: 1860.56

## 2019-10-21 ASSESSMENT — PAIN SCALES - GENERAL: PAINLEVEL: NO PAIN (0)

## 2019-10-21 NOTE — PROGRESS NOTES
HISTORY OF PRESENT ILLNESS:  Mr. Thomson is back to see us again today.  He is doing better from the standpoint of his nose; however, he has been having some more productive cough and some asthma exacerbation.  He feels that his nose is opening up, and he is having less congestion and producing less mucus from his nose.      PHYSICAL EXAMINATION:  He is in no distress, alert and appropriate.  Breathing without difficulty or stridor.  Eyes are anicteric.  Skin of the head and neck appears normal.      PROCEDURE:  Examination of the nose is performed.  The nose is sprayed with lidocaine and Afrin.  Nasal endoscopy is performed.  The patient has improved mucosal edema and patent middle meatus bilaterally with an overall improved appearance still with a small amount of debris.      ASSESSMENT AND PLAN:  A 49-year-old status post FESS with some postoperative edema but improving now.  He feels that he has had some thicker cough and asthma exacerbation recently but reports it to be getting better.  We discussed whether to try him on a new antibiotic, but he would like to wait and see how things go.  He will let us know if he has further problems in the future, and I will see him in two weeks.

## 2019-10-21 NOTE — LETTER
10/21/2019       RE: Dion Thomson  [facility] [facility] [address]     Dear Colleague,    Thank you for referring your patient, Dion Thomson, to the [facility] EAR NOSE AND THROAT at [facility]. Please see a copy of my visit note below.    HISTORY OF PRESENT ILLNESS:  Mr. Thomson is back to see us again today.  He is doing better from the standpoint of his nose; however, he has been having some more productive cough and some asthma exacerbation.  He feels that his nose is opening up, and he is having less congestion and producing less mucus from his nose.      PHYSICAL EXAMINATION:  He is in no distress, alert and appropriate.  Breathing without difficulty or stridor.  Eyes are anicteric.  Skin of the head and neck appears normal.      PROCEDURE:  Examination of the nose is performed.  The nose is sprayed with lidocaine and Afrin.  Nasal endoscopy is performed.  The patient has improved mucosal edema and patent middle meatus bilaterally with an overall improved appearance still with a small amount of debris.      ASSESSMENT AND PLAN:  A 49-year-old status post FESS with some postoperative edema but improving now.  He feels that he has had some thicker cough and asthma exacerbation recently but reports it to be getting better.  We discussed whether to try him on a new antibiotic, but he would like to wait and see how things go.  He will let us know if he has further problems in the future, and I will see him in two weeks.       Again, thank you for allowing me to participate in the care of your patient.      Sincerely,    Willi Garcia MD

## 2019-10-21 NOTE — NURSING NOTE
"Chief Complaint   Patient presents with     RECHECK     1 week follow up      Blood pressure 125/77, pulse 86, resp. rate 18, height 1.77 m (5' 9.69\"), weight 98.4 kg (217 lb).    Jurgen Banks LPN    "

## 2019-10-30 DIAGNOSIS — Z79.899 ENCOUNTER FOR LONG-TERM (CURRENT) USE OF MEDICATIONS: ICD-10-CM

## 2019-10-30 DIAGNOSIS — E78.00 HYPERCHOLESTEREMIA: ICD-10-CM

## 2019-10-30 NOTE — TELEPHONE ENCOUNTER
Refill medication Atorvastatin  LOV 01/18/2019  Labs 09/21/2018  No upcoming appt scheduled  Neela Madrigal MA on 10/30/2019 at 9:39 AM

## 2019-10-31 RX ORDER — ATORVASTATIN CALCIUM 10 MG/1
TABLET, FILM COATED ORAL
Qty: 90 TABLET | Refills: 3 | Status: SHIPPED | OUTPATIENT
Start: 2019-10-31 | End: 2020-10-06

## 2019-11-04 ENCOUNTER — OFFICE VISIT (OUTPATIENT)
Dept: OTOLARYNGOLOGY | Facility: CLINIC | Age: 49
End: 2019-11-04
Payer: COMMERCIAL

## 2019-11-04 VITALS — WEIGHT: 215 LBS | BODY MASS INDEX: 31.13 KG/M2

## 2019-11-04 DIAGNOSIS — J01.01 ACUTE RECURRENT MAXILLARY SINUSITIS: ICD-10-CM

## 2019-11-04 DIAGNOSIS — Z98.890 S/P FESS (FUNCTIONAL ENDOSCOPIC SINUS SURGERY): Primary | ICD-10-CM

## 2019-11-04 RX ORDER — PREDNISONE 10 MG/1
40 TABLET ORAL DAILY
Qty: 20 TABLET | Refills: 0 | Status: SHIPPED | OUTPATIENT
Start: 2019-11-04 | End: 2019-11-26

## 2019-11-04 ASSESSMENT — PAIN SCALES - GENERAL: PAINLEVEL: MILD PAIN (2)

## 2019-11-04 NOTE — LETTER
11/4/2019       RE: Dion Thomson  [address] [facility] [address] [facility] [address]     Dear Colleague,    Thank you for referring your patient, Dion Thomson, to the [facility] EAR NOSE AND THROAT at [facility]. Please see a copy of my visit note below.    HISTORY OF PRESENT ILLNESS:  Mr. Thomson is here to see us again today.  He is still having congestion and drainage from his nose.      PROCEDURE NOTE:  Today on endoscopy, we visualized purulence as well as congestion.  A culture is taken from the right side of the nose.  Sinuses appear open, but he certainly is having a slow recovery.      PLAN:  At this point, plan is for a course of Augmentin.  We will await the culture results as well.  He will follow up in two weeks.         Again, thank you for allowing me to participate in the care of your patient.      Sincerely,    Willi Garcia MD

## 2019-11-04 NOTE — NURSING NOTE
Chief Complaint   Patient presents with     RECHECK     2 week follow-up     Weight 97.5 kg (215 lb).    Anahi Chopra EMT

## 2019-11-04 NOTE — PROGRESS NOTES
HISTORY OF PRESENT ILLNESS:  Mr. Thomson is here to see us again today.  He is still having congestion and drainage from his nose.      PROCEDURE NOTE:  Today on endoscopy, we visualized purulence as well as congestion.  A culture is taken from the right side of the nose.  Sinuses appear open, but he certainly is having a slow recovery.      PLAN:  At this point, plan is for a course of Augmentin.  We will await the culture results as well.  He will follow up in two weeks.

## 2019-11-04 NOTE — PATIENT INSTRUCTIONS
You were seen in the ENT clinic today with Dr. Garcia    Recommendations for you:    -Gentamicin Nasal Irrigations   -Augmentin one capsule twice daily for two weeks   -A culture of your sinus has been taken. We will call you with the results of this.       We would like you to follow up in 2 weeks      Please call our clinic for any questions, concerns, and/or worsening symptoms.      Clinic #[phone]       Option 1 for scheduling.    Thank you for allowing us to be apart of your care!    Jade MYERS, UZAIRCC    If you need to reach me my direct line is: [phone]     Gentamicin Nasal Irrigations (Jacob's Solution)    - Gentamicin is a compounded antibiotic which means it is made only when ordered and by compounding pharmacies.  [facility]  [address], [place] [id number] [phone]  - This medication will arrive in mail, frozen, in 4 (1) liter bottles. Put 3 bottles in the freezer, let one bottle thaw, keep that bottle in the refrigerator. Before you irrigate, let solution warm to room temp (can put in warm bath if you wish, but do not microwave)  -  Always store your solution in your refrigerator and administer at room temperature.  -  The solution should be okay to use for 14 days from the day it was made by the pharmacy.  -  A small amount of the solution stays in the bottle because the straw does not go all the way to the bottom-this is ok.  - Clean your irrigating device often. Wash it with warm, soapy water, and let it air dry. Or follow device cleaning instructions.   -  Your prescription will have refills, so when you're about 12 days into the cycle, call the pharmacy and get a refill. Be mindful that it takes the pharmacy a day to make the solution and then the mailing time.     Instructions:    1. Measure out the amount of the gentamicin solution you will need.   - The usual dose is 30 mL.    2. Let the solution you measure out warm up for 30 minutes before you use it  (Room Temperature). Keep the rest of the solution in the refrigerator.    3. With a large syringe, sinus rinse squeeze bottle, or neti pot complete the rinse per doctor's head position recommendation (lying head back, lying head lateral, or head down and forward).      Use   of the solution for the first nostril.     the shower or lean over the sink.    Tilt your head forward and turn it to one side.    Place the irrigating device in your upper nostril.     Pour or squirt the solution gently into your nostril.    Aim for the back of your head, not the top of your head.    Gravity or gentle pressure should help the solution move through your sinuses and out your lower nostril.    Never force the solution through your nostrils.    Some solution may drain into your throat. This is normal. Try gently breathing out through your nose to help the solution drain through your lower nostril and not down your throat.     4. Repeat these steps with your other nostril.    5. At first, you may feel mild burning or stinging when you rinse with the solution. This should go away after the first few times you use it.

## 2019-11-07 ENCOUNTER — HEALTH MAINTENANCE LETTER (OUTPATIENT)
Age: 49
End: 2019-11-07

## 2019-11-11 ENCOUNTER — TELEPHONE (OUTPATIENT)
Dept: OTOLARYNGOLOGY | Facility: CLINIC | Age: 49
End: 2019-11-11

## 2019-11-12 LAB
BACTERIA SPEC CULT: ABNORMAL
SPECIMEN SOURCE: ABNORMAL

## 2019-11-25 ENCOUNTER — OFFICE VISIT (OUTPATIENT)
Dept: OTOLARYNGOLOGY | Facility: CLINIC | Age: 49
End: 2019-11-25
Payer: COMMERCIAL

## 2019-11-25 VITALS — WEIGHT: 222 LBS | HEIGHT: 70 IN | BODY MASS INDEX: 31.78 KG/M2

## 2019-11-25 DIAGNOSIS — Z98.890 S/P FESS (FUNCTIONAL ENDOSCOPIC SINUS SURGERY): Primary | ICD-10-CM

## 2019-11-25 DIAGNOSIS — J01.01 ACUTE RECURRENT MAXILLARY SINUSITIS: ICD-10-CM

## 2019-11-25 ASSESSMENT — PAIN SCALES - GENERAL: PAINLEVEL: NO PAIN (0)

## 2019-11-25 ASSESSMENT — MIFFLIN-ST. JEOR: SCORE: 1888.24

## 2019-11-25 NOTE — PROGRESS NOTES
HISTORY OF PRESENT ILLNESS:  Mr. Thomson is back to see us again today.  He is doing better than he was last time, less drainage, improved breathing, improved ability to smell.  He has finished his prednisone and Augmentin, but is continuing the gentamicin.      PHYSICAL EXAMINATION:  This is a 49-year-old gentleman in no acute distress.  Normal mood, normal affect, normal ability to communicate.  Alert and appropriate.  Breathing without difficulty or stridor.  Eyes are anicteric.  Skin of the head and neck appears normal.      PROCEDURE:  Examination of the nose is performed endoscopically.  The nose is sprayed with lidocaine and Afrin.  Nasal endoscopy is performed.  The patient has a patent sinuses, improved nasal mucosal appearance with minimal edema.      ASSESSMENT:  A 49-year-old finally seeming to do better on recovery from FESS.      PLAN:   At this point, continue watchful waiting and follow up in two weeks.

## 2019-11-25 NOTE — NURSING NOTE
"Chief Complaint   Patient presents with     RECHECK     2 week follow-up     Height 1.778 m (5' 10\"), weight 100.7 kg (222 lb).    Anahi Chopra, EMT    "

## 2019-11-25 NOTE — LETTER
11/25/2019       RE: Dion Thomson  [address] [facility] [address] [facility] [address]     Dear Colleague,    Thank you for referring your patient, Dion Thomson, to the [facility] EAR NOSE AND THROAT at [facility]. Please see a copy of my visit note below.    HISTORY OF PRESENT ILLNESS:  Mr. Thomson is back to see us again today.  He is doing better than he was last time, less drainage, improved breathing, improved ability to smell.  He has finished his prednisone and Augmentin, but is continuing the gentamicin.      PHYSICAL EXAMINATION:  This is a 49-year-old gentleman in no acute distress.  Normal mood, normal affect, normal ability to communicate.  Alert and appropriate.  Breathing without difficulty or stridor.  Eyes are anicteric.  Skin of the head and neck appears normal.      PROCEDURE:  Examination of the nose is performed endoscopically.  The nose is sprayed with lidocaine and Afrin.  Nasal endoscopy is performed.  The patient has a patent sinuses, improved nasal mucosal appearance with minimal edema.      ASSESSMENT:  A 49-year-old finally seeming to do better on recovery from FESS.      PLAN:   At this point, continue watchful waiting and follow up in two weeks.         Again, thank you for allowing me to participate in the care of your patient.      Sincerely,    Willi Garcia MD

## 2019-11-26 RX ORDER — PREDNISONE 10 MG/1
40 TABLET ORAL DAILY
Qty: 20 TABLET | Refills: 0 | Status: SHIPPED | OUTPATIENT
Start: 2019-11-26 | End: 2020-10-06

## 2019-12-09 ENCOUNTER — OFFICE VISIT (OUTPATIENT)
Dept: OTOLARYNGOLOGY | Facility: CLINIC | Age: 49
End: 2019-12-09
Payer: COMMERCIAL

## 2019-12-09 VITALS — BODY MASS INDEX: 31.78 KG/M2 | HEIGHT: 70 IN | WEIGHT: 222 LBS

## 2019-12-09 DIAGNOSIS — Z98.890 S/P FESS (FUNCTIONAL ENDOSCOPIC SINUS SURGERY): Primary | ICD-10-CM

## 2019-12-09 ASSESSMENT — MIFFLIN-ST. JEOR: SCORE: 1888.24

## 2019-12-09 ASSESSMENT — PAIN SCALES - GENERAL: PAINLEVEL: NO PAIN (0)

## 2019-12-09 NOTE — PROGRESS NOTES
HISTORY OF PRESENT ILLNESS:  Dion is back to see us again today.  He is feeling fairly well, but still has a sense of congestion.  He is not having any purulent rhinorrhea.      PROCEDURE:   I did examine his nose again today endoscopically.  First it was sprayed with lidocaine and Afrin and nasal endoscopy.  He does have patent middle meati and maxillary antrostomies with just some persistent edematous mucosa.      PLAN:     1) At this point, plan for finishing his Jacob's solution.     2) Repeat CT scan.    3) Followup in a week.

## 2019-12-09 NOTE — LETTER
12/9/2019       RE: Dion Thomson  [address] [facility] [address] [facility] [address]     Dear Colleague,    Thank you for referring your patient, Dion Thomson, to the [facility] Please see a copy of my visit note below.    HISTORY OF PRESENT ILLNESS:  Dion is back to see us again today.  He is feeling fairly well, but still has a sense of congestion.  He is not having any purulent rhinorrhea.      PROCEDURE:   I did examine his nose again today endoscopically.  First it was sprayed with lidocaine and Afrin and nasal endoscopy.  He does have patent middle meati and maxillary antrostomies with just some persistent edematous mucosa.      PLAN:     1) At this point, plan for finishing his Jacob's solution.     2) Repeat CT scan.    3) Followup in a week.         Again, thank you for allowing me to participate in the care of your patient.      Sincerely,    Willi Garcia MD

## 2019-12-09 NOTE — NURSING NOTE
"Chief Complaint   Patient presents with     RECHECK     2 week follow-up, s/p FESS     Height 1.778 m (5' 10\"), weight 100.7 kg (222 lb).    Anahi Chopra, EMT    "

## 2019-12-16 ENCOUNTER — TRANSFERRED RECORDS (OUTPATIENT)
Dept: FAMILY MEDICINE | Facility: CLINIC | Age: 49
End: 2019-12-16

## 2020-01-08 DIAGNOSIS — J01.01 ACUTE RECURRENT MAXILLARY SINUSITIS: Primary | ICD-10-CM

## 2020-01-08 RX ORDER — BUDESONIDE 0.5 MG/2ML
INHALANT ORAL
Qty: 30 AMPULE | Refills: 6 | Status: SHIPPED | OUTPATIENT
Start: 2020-01-08 | End: 2020-01-13 | Stop reason: ALTCHOICE

## 2020-01-10 ENCOUNTER — PATIENT OUTREACH (OUTPATIENT)
Dept: OTOLARYNGOLOGY | Facility: CLINIC | Age: 50
End: 2020-01-10

## 2020-01-10 DIAGNOSIS — J45.40 MODERATE PERSISTENT REACTIVE AIRWAY DISEASE WITHOUT COMPLICATION: ICD-10-CM

## 2020-01-10 DIAGNOSIS — J45.20 MILD INTERMITTENT ASTHMA WITHOUT COMPLICATION: Primary | ICD-10-CM

## 2020-01-10 NOTE — PROGRESS NOTES
RN received phone call from Dr. Willi Garcia requesting patient outreach to patient regarding questions about Budesonide nasal irrigations. RN calling patient to discuss. RN left a voicemail requesting a call back. Direct contact information provided.     Jade Garcia RN

## 2020-01-13 RX ORDER — BUDESONIDE 0.25 MG/2ML
0.5 INHALANT ORAL 2 TIMES DAILY
Qty: 120 AMPULE | Refills: 0 | Status: SHIPPED | OUTPATIENT
Start: 2020-01-13 | End: 2020-02-14

## 2020-01-13 NOTE — PROGRESS NOTES
After discussion with Dr. Garcia, prescription for budesonide has been clarified. New prescription sent to patient's pharmacy with updated diagnosis and instructions.

## 2020-01-14 NOTE — PROGRESS NOTES
RN calling patient to inform him that updated prescription was sent. RN request that patient call back to discuss this further. Direct contact information provided for patient.     Jade Garcia RN

## 2020-02-10 ENCOUNTER — TELEPHONE (OUTPATIENT)
Dept: OTOLARYNGOLOGY | Facility: CLINIC | Age: 50
End: 2020-02-10

## 2020-02-10 DIAGNOSIS — J01.01 ACUTE RECURRENT MAXILLARY SINUSITIS: Primary | ICD-10-CM

## 2020-02-10 NOTE — TELEPHONE ENCOUNTER
Called patient to schedule:    1 month follow up with Dr. Garcia, CT scan of sinus prior    Left a VM with scheduling instructions and the ENT call center number.

## 2020-02-14 ENCOUNTER — PATIENT OUTREACH (OUTPATIENT)
Dept: OTOLARYNGOLOGY | Facility: CLINIC | Age: 50
End: 2020-02-14

## 2020-02-14 DIAGNOSIS — J45.40 MODERATE PERSISTENT REACTIVE AIRWAY DISEASE WITHOUT COMPLICATION: ICD-10-CM

## 2020-02-14 RX ORDER — BUDESONIDE 0.25 MG/2ML
0.5 INHALANT ORAL 2 TIMES DAILY
Qty: 120 AMPULE | Refills: 3 | Status: SHIPPED | OUTPATIENT
Start: 2020-02-14 | End: 2020-03-09

## 2020-02-14 NOTE — PROGRESS NOTES
RN called patient to inform him that budesonide prescription has been sent to patients pharmacy. RN informed patient that his preferred pharmacy reports that 0.25mg/2mL is a 10$ co-pay for the patient rather than 0.5mg/2mL that is not covered by the patient's insurance. Per Dr. Garcia, patient may flex medication to originally prescribed dosage of Budesonide 05.mg/2mL. Patient is in agreement with this plan. Patient denies any further questions or concerns at this time.     Jade Garcia RN

## 2020-03-05 NOTE — TELEPHONE ENCOUNTER
[facility] Call Center    Phone Message    May a detailed message be left on voicemail: yes     Reason for Call: Medication Refill Request    Has the patient contacted the pharmacy for the refill? Yes   Name of medication being requested: budesonide (PULMICORT) 0.25 MG/2ML neb solution  Provider who prescribed the medication: Dr. Garcia  Pharmacy: [facility] [id number] [place] [address]  Date medication is needed:       Action Taken: Message routed to:  [facility] ([facility] ENT    Travel Screening: Not Applicable

## 2020-03-09 ENCOUNTER — TELEPHONE (OUTPATIENT)
Dept: OTOLARYNGOLOGY | Facility: CLINIC | Age: 50
End: 2020-03-09

## 2020-03-09 DIAGNOSIS — J45.40 MODERATE PERSISTENT REACTIVE AIRWAY DISEASE WITHOUT COMPLICATION: ICD-10-CM

## 2020-03-09 RX ORDER — BUDESONIDE 0.25 MG/2ML
0.5 INHALANT ORAL 2 TIMES DAILY
Qty: 120 AMPULE | Refills: 3 | Status: SHIPPED | OUTPATIENT
Start: 2020-03-09 | End: 2020-06-25

## 2020-03-09 NOTE — TELEPHONE ENCOUNTER
Spoke with patient. Informed patient that Budesonide  Nebulizer prescription  was sent to [facility] in [place].  Jurgen Banks LPN

## 2020-04-07 ENCOUNTER — TELEPHONE (OUTPATIENT)
Dept: OTOLARYNGOLOGY | Facility: CLINIC | Age: 50
End: 2020-04-07

## 2020-04-07 NOTE — TELEPHONE ENCOUNTER
RN calling patient back to discuss upcoming appointment with Dr. Garcia. RN encouraged patient to touch base with Dr. Garcia should he still be having symptoms. If the patient is not having symptoms then it is reasonable to defer this appointment until he is able to obtain the CT scan. RN requested that the patient reach back out with how he would like to proceed.     Jade Garcia RN  Time Spent: 15 minutes

## 2020-04-07 NOTE — TELEPHONE ENCOUNTER
"Called patient to change 4/13 appointment with Dr. Garcia to virtual visit.    Patient is wondering if the appointment is necessary, as he was unable to get the CT scan. Would like to make sure that \"there are still things to discuss\" with Dr. Garcia.    Informed patient that I will send message along to Dr. Garcia' nurse, Jade, and that we will reach out later today to discuss.    Message routed to UZAIR [place].  " [id number] [facility]

## 2020-06-23 DIAGNOSIS — J45.40 MODERATE PERSISTENT REACTIVE AIRWAY DISEASE WITHOUT COMPLICATION: ICD-10-CM

## 2020-06-25 NOTE — TELEPHONE ENCOUNTER
BUDESONIDE 0.25 MG/2 ML SUSP       Last Written Prescription Date:  3-9-20  Last Fill Quantity: 120 amp,   # refills: 3  Last Office Visit : 12-9-19 ( RTC 1 week)  Future Office visit:  none    Routing refill request to provider for review/approval because:  FYJAVIER overdue ACT/appt  ? RF- RTC- CT      See my chart message 4-8-20

## 2020-06-26 RX ORDER — BUDESONIDE 0.25 MG/2ML
INHALANT ORAL
Qty: 120 ML | Refills: 3 | Status: SHIPPED | OUTPATIENT
Start: 2020-06-26 | End: 2020-11-23

## 2020-09-21 DIAGNOSIS — K21.9 GASTROESOPHAGEAL REFLUX DISEASE WITHOUT ESOPHAGITIS: ICD-10-CM

## 2020-10-06 ENCOUNTER — OFFICE VISIT (OUTPATIENT)
Dept: FAMILY MEDICINE | Facility: CLINIC | Age: 50
End: 2020-10-06

## 2020-10-06 VITALS
WEIGHT: 220 LBS | RESPIRATION RATE: 16 BRPM | DIASTOLIC BLOOD PRESSURE: 74 MMHG | SYSTOLIC BLOOD PRESSURE: 118 MMHG | BODY MASS INDEX: 32.58 KG/M2 | HEIGHT: 69 IN | TEMPERATURE: 98.7 F | OXYGEN SATURATION: 97 % | HEART RATE: 80 BPM

## 2020-10-06 DIAGNOSIS — J45.20 MILD INTERMITTENT ASTHMA WITHOUT COMPLICATION: Primary | ICD-10-CM

## 2020-10-06 DIAGNOSIS — G47.33 OSA (OBSTRUCTIVE SLEEP APNEA): ICD-10-CM

## 2020-10-06 DIAGNOSIS — E78.00 HYPERCHOLESTEREMIA: ICD-10-CM

## 2020-10-06 DIAGNOSIS — K21.9 GASTROESOPHAGEAL REFLUX DISEASE WITHOUT ESOPHAGITIS: ICD-10-CM

## 2020-10-06 DIAGNOSIS — Z12.5 SCREENING FOR PROSTATE CANCER: ICD-10-CM

## 2020-10-06 LAB
% GRANULOCYTES: 77.9 % (ref 42.2–75.2)
HCT VFR BLD AUTO: 41.3 % (ref 39–51)
HEMOGLOBIN: 14.1 G/DL (ref 13.4–17.5)
LYMPHOCYTES NFR BLD AUTO: 16.9 % (ref 20.5–51.1)
MCH RBC QN AUTO: 28.7 PG (ref 27–31)
MCHC RBC AUTO-ENTMCNC: 34.2 G/DL (ref 33–37)
MCV RBC AUTO: 83.8 FL (ref 80–100)
MONOCYTES NFR BLD AUTO: 5.2 % (ref 1.7–9.3)
PLATELET # BLD AUTO: 240 K/UL (ref 140–450)
RBC # BLD AUTO: 4.93 X10/CMM (ref 4.2–5.9)
WBC # BLD AUTO: 8.3 X10/CMM (ref 3.8–11)

## 2020-10-06 PROCEDURE — 85025 COMPLETE CBC W/AUTO DIFF WBC: CPT | Performed by: FAMILY MEDICINE

## 2020-10-06 PROCEDURE — 99214 OFFICE O/P EST MOD 30 MIN: CPT | Performed by: FAMILY MEDICINE

## 2020-10-06 RX ORDER — ATORVASTATIN CALCIUM 10 MG/1
10 TABLET, FILM COATED ORAL DAILY
Qty: 90 TABLET | Refills: 3 | Status: SHIPPED | OUTPATIENT
Start: 2020-10-06 | End: 2021-10-05

## 2020-10-06 RX ORDER — ALBUTEROL SULFATE 90 UG/1
2 AEROSOL, METERED RESPIRATORY (INHALATION) EVERY 6 HOURS PRN
Qty: 18 G | Refills: 11 | Status: SHIPPED | OUTPATIENT
Start: 2020-10-06 | End: 2022-04-01

## 2020-10-06 ASSESSMENT — MIFFLIN-ST. JEOR: SCORE: 1850.35

## 2020-10-06 NOTE — PATIENT INSTRUCTIONS
"Thank you for coming in today!   If you receive a survey via My Chart, mail or phone, please let us know if there was anything you especially appreciated today or if there is any way we can improve our clinic. We value your input.     Likewise, we are working hard to attend to our digital reputation.    Please consider reviewing our clinic on Google and/or Facebook via the following links:    [email or web address]    We truly appreciate you taking the time to do this!    General Information:    Today you had your appointment with Viral Rivera MD    I am in the clinic:  Monday and Friday mornings  Tuesday and Thursday afternoons    I am not in the office Wednesdays, non urgent calls received on Wednesday will be addressed when I am back in the office on Thursday.    If lab work was done today as part of your evaluation you will generally be contacted via My Chart, mail, or phone with the results within 1-5 days. If there is an alarming result we will contact you by phone. Lab results come back at varying times, I generally wait until all labs are resulted before making comments on results. Please note labs are automatically released to My Chart once available.    If you need refills please contact your pharmacist. They will send a refill request to me to review. Please allow 3 business days for us to process all refill requests.    Please call or send a medical message with any questions or concerns.    Thank you for choosing [facility].  We truly appreciate you trusting us with your medical care.    Your next visit with us should be scheduled for Follow up in 6 months.     Listed next are the medical health Maintenance items we are tracking for your care and when they are next due (or overdue!)  Our goal is 100% \"up to date.\" Keep this list handy to call and schedule what you are due for in the future!    Health " Maintenance Due   Topic Date Due     Pneumococcal Vaccine: Pediatrics (0 to 5 Years) and At-Risk Patients (6 to 64 Years) (1 of 1 - PPSV23) 09/29/1978     HIV SCREENING  09/29/1987     PREVENTIVE CARE VISIT  09/21/2019     ASTHMA CONTROL TEST  12/03/2019     INFLUENZA VACCINE (1) 09/01/2020       Sincerely,    Viral Rivera MD

## 2020-10-06 NOTE — PROGRESS NOTES
Problem(s) Oriented visit      Subjective:  CC: Recheck Medication (Fasting)      1. Mild intermittent asthma without complication  Seeing the good folks at the ...  - Asthma Action Plan (AAP)    2. Hypercholesteremia  Hyperlipidemia:  Has history of hyperlipidemia.    The patient is  taking a medication for this.  Denies any significant side effects from his medication.      Latest labs reviewed:    Recent Labs   Lab Test 09/21/18  0933 04/21/17  0815   CHOL 150 152   HDL 39* 34*   LDL 83 83   TRIG 142 173*        Lab Results   Component Value Date    AST 19 09/21/2018          3. URBANO (obstructive sleep apnea)  Has known obstructive sleep apnea.  Has been prescribed CPAP, uses it almost every night.  They feel it helps, and generally awakens feeling relatively refreshed, no daytime somnolence.    4. Gastroesophageal reflux disease without esophagitis  GERD stable.  Taking meds as ordered, no reported side effects from medicines.  Eating properly to avoid sx.   No melena, no hematochezia, no diarrhea.  No unintended weigth loss.         ROS:  General and CV completed and negative except as noted above     HISTORY:   reports current alcohol use.   reports that he has never smoked. He has never used smokeless tobacco.    Past Medical History:   Diagnosis Date     Allergic rhinitis      Anxiety      Chronic sinusitis      Chronic sinusitis      Gastroesophageal reflux disease      Hypercholesteremia      Intermittent asthma 12/20/2013     Obstructive sleep apnea      URBANO (obstructive sleep apnea) 12/19/2014     Past Surgical History:   Procedure Laterality Date     ARTHROSCOPIC RECONSTRUCTION ANTERIOR CRUCIATE LIGAMENT  1997    Open, Knee Left     ARTHROSCOPY KNEE RT/LT Left 04/2005    meniscus repair     EXTRACTION(S) DENTAL       OPTICAL TRACKING SYSTEM ENDOSCOPIC SINUS SURGERY Bilateral 9/30/2019    Procedure: Image Guided Bilateral Functional Endoscopic Sinus Surgery, Bilateral turbinate reduction;  Surgeon:  Willi Garcia MD;  Location: [facility] OR     TONSILLECTOMY & ADENOIDECTOMY  5 yo     VASECTOMY      Vasectomy       EXAM:  BP: 118/74   Pulse: 80    Temp: 98.7    Wt Readings from Last 2 Encounters:   10/06/20 99.8 kg (220 lb)   12/09/19 100.7 kg (222 lb)       BMI= Body mass index is 32.96 kg/m .    EXAM:  APPEARANCE: = Relaxed and in no distress  Conj/Eyelids = noninjected and lids and lashes are without inflammation  PERRLA/Irises = Pupils Round Reactive to Light and Irisis without inflammation  Neck = No anterior or posterior adenopathy appreciated.  Thyroid = Not enlarged and no masses felt  Resp effort = Calm regular breathing  Breath Sounds = Good air movement with no rales or rhonchi in any lung fields  Heart Rate, Rythym, & sounds (no Murm)  = Regular rate and rythym with no S3, S4, or murmer appreciated.  Carotid Art's = Pulses full and equal and no bruits appreciated  Abdomen = Soft, nontender, no masses, & bowel sounds in all quadrants  Liver/Spleen = Normal span and no splenomegaly noted  Digits and Nails = FROM in all finger joints, no nail dystrophy  Ext (edema) = No pretibial edema noted or elsewhere  Musculsktl =  Strength and ROM of major joints are within normal limits  SKIN = absent significant rashes or lesions   Recent/Remote Memory = Alert and Oriented x 3  Mood/Affect = Cooperative and interested      Assessment/Plan:  Dion was seen today for recheck medication.    Diagnoses and all orders for this visit:    Mild intermittent asthma without complication  Known issue that I take into account for her medical decisions, no current exacerbations or new concerns.      -     albuterol (PROAIR HFA/PROVENTIL HFA/VENTOLIN HFA) 108 (90 Base) MCG/ACT inhaler; Inhale 2 puffs into the lungs every 6 hours as needed for shortness of breath / dyspnea (Pt. last used months ago 09/26/2019)    Hypercholesteremia  Discussed current lipid results, previous results (if available) current guidelines (NCEP) for  "treatment and goals for lipids.  Discussed lifestyle modification, dietary changes (low fat, low simple carb) and regular aerobic exercise.  Discussed the link between dysmetabolic syndrome and impaired glucose tolerance seen in certain patterns of lipids.  Briefly discussed medication used for lipid lowering, including the statins are their possible side effects of myalgias, rhabdomyolysis, and liver toxicity.    -     atorvastatin (LIPITOR) 10 MG tablet; Take 1 tablet (10 mg) by mouth daily  -     Lipid Panel ([facility])    URBANO (obstructive sleep apnea)  The patients symptoms as listed above sound suspicious for sleep apnea.  Discussed sleep apnea syndrome in detail including symptoms, workup, diagnostic process, treatment considerations including sleep studies, CPAP, and ENT surgery if abnormal upper airway physical abnormalities.  Will refer to pulmonary for initiation of diagnostic procedures to include sleep study if applicable.    Gastroesophageal reflux disease without esophagitis  -     omeprazole (PRILOSEC) 20 MG DR capsule; TAKE 1 CAPSULE BY MOUTH EVERY MORNING BEFORE BREAKFAST  -     Comp. Metabolic Panel (14) ([facility])  -     CBC with Diff/Plt ([facility])    Screening for prostate cancer  -     PSA Serum ([facility])        COUNSELING:   reports that he has never smoked. He has never used smokeless tobacco.    Estimated body mass index is 32.96 kg/m  as calculated from the following:    Height as of this encounter: 1.74 m (5' 8.5\").    Weight as of this encounter: 99.8 kg (220 lb).   Weight management plan: Discussed healthy diet and exercise guidelines    Appropriate preventive services were discussed with this patient, including applicable screening as appropriate for cardiovascular disease, diabetes, osteopenia/osteoporosis, and glaucoma.  As appropriate for age/gender, discussed screening for colorectal cancer, prostate cancer, breast cancer, and cervical cancer. Checklist reviewing preventive services " available has been given to the patient.    Reviewed patients plan of care and provided an AVS. The Basic Care Plan (routine screening as documented in Health Maintenance) for Dion meets the Care Plan requirement. This Care Plan has been established and reviewed with the  Patient.      The following health maintenance items are reviewed in Epic and correct as of today:  Health Maintenance   Topic Date Due     Pneumococcal Vaccine: Pediatrics (0 to 5 Years) and At-Risk Patients (6 to 64 Years) (1 of 1 - PPSV23) 09/29/1978     HIV SCREENING  09/29/1987     PREVENTIVE CARE VISIT  09/21/2019     ASTHMA CONTROL TEST  12/03/2019     INFLUENZA VACCINE (1) 09/01/2020     DTAP/TDAP/TD IMMUNIZATION (2 - Td) 08/08/2021     ASTHMA ACTION PLAN  10/06/2021     LIPID  09/21/2023     PHQ-2  Completed     IPV IMMUNIZATION  Aged Out     MENINGITIS IMMUNIZATION  Aged Out     HEPATITIS B IMMUNIZATION  Aged Out       Viral Rivera MD  [facility]  [phone]       For any issues my office # is [phone]

## 2020-10-06 NOTE — LETTER
My Asthma Action Plan    Name: Dion Thomson   YOB: 1970  Date: 10/6/2020   My doctor: Viral Rivera MD   My clinic: [facility]        My Rescue Medicine:   Albuterol inhaler (Proair/Ventolin/Proventil HFA)  2-4 puffs EVERY 4 HOURS as needed. Use a spacer if recommended by your provider.   My Asthma Severity:   Intermittent / Exercise Induced  Know your asthma triggers: Patient is unaware of triggers             GREEN ZONE   Good Control    I feel good    No cough or wheeze    Can work, sleep and play without asthma symptoms       Take your asthma control medicine every day.     1. If exercise triggers your asthma, take your rescue medication    15 minutes before exercise or sports, and    During exercise if you have asthma symptoms  2. Spacer to use with inhaler: If you have a spacer, make sure to use it with your inhaler             YELLOW ZONE Getting Worse  I have ANY of these:    I do not feel good    Cough or wheeze    Chest feels tight    Wake up at night   1. Keep taking your Green Zone medications  2. Start taking your rescue medicine:    every 20 minutes for up to 1 hour. Then every 4 hours for 24-48 hours.  3. If you stay in the Yellow Zone for more than 12-24 hours, contact your doctor.  4. If you do not return to the Green Zone in 12-24 hours or you get worse, start taking your oral steroid medicine if prescribed by your provider.           RED ZONE Medical Alert - Get Help  I have ANY of these:    I feel awful    Medicine is not helping    Breathing getting harder    Trouble walking or talking    Nose opens wide to breathe       1. Take your rescue medicine NOW  2. If your provider has prescribed an oral steroid medicine, start taking it NOW  3. Call your doctor NOW  4. If you are still in the Red Zone after 20 minutes and you have not reached your doctor:    Take your rescue medicine again and    Call 911 or go to the emergency room right away    See your regular  doctor within 2 weeks of an Emergency Room or Urgent Care visit for follow-up treatment.          Annual Reminders:  Meet with Asthma Educator,  Flu Shot in the Fall, consider Pneumonia Vaccination for patients with asthma (aged 19 and older).    Pharmacy:    [id number] [place] [facility] - [address] [facility] - [place], [place] - [address]     Electronically signed by Viral Rivera MD   Date: 10/06/20                    Asthma Triggers  How To Control Things That Make Your Asthma Worse    Triggers are things that make your asthma worse.  Look at the list below to help you find your triggers and   what you can do about them. You can help prevent asthma flare-ups by staying away from your triggers.      Trigger                                                          What you can do   Cigarette Smoke  Tobacco smoke can make asthma worse. Do not allow smoking in your home, car or around you.  Be sure no one smokes at a child s day care or school.  If you smoke, ask your health care provider for ways to help you quit.  Ask family members to quit too.  Ask your health care provider for a referral to Quit Plan to help you quit smoking, or call 2-678-586-PLAN.     Colds, Flu, Bronchitis  These are common triggers of asthma. Wash your hands often.  Don t touch your eyes, nose or mouth.  Get a flu shot every year.     Dust Mites  These are tiny bugs that live in cloth or carpet. They are too small to see. Wash sheets and blankets in hot water every week.   Encase pillows and mattress in dust mite proof covers.  Avoid having carpet if you can. If you have carpet, vacuum weekly.   Use a dust mask and HEPA vacuum.   Pollen and Outdoor Mold  Some people are allergic to trees, grass, or weed pollen, or molds. Try to keep your windows closed.  Limit time out doors when pollen count is high.   Ask you health care provider about taking medicine during allergy season.     Animal  Dander  Some people are allergic to skin flakes, urine or saliva from pets with fur or feathers. Keep pets with fur or feathers out of your home.    If you can t keep the pet outdoors, then keep the pet out of your bedroom.  Keep the bedroom door closed.  Keep pets off cloth furniture and away from stuffed toys.     Mice, Rats, and Cockroaches  Some people are allergic to the waste from these pests.   Cover food and garbage.  Clean up spills and food crumbs.  Store grease in the refrigerator.   Keep food out of the bedroom.   Indoor Mold  This can be a trigger if your home has high moisture. Fix leaking faucets, pipes, or other sources of water.   Clean moldy surfaces.  Dehumidify basement if it is damp and smelly.   Smoke, Strong Odors, and Sprays  These can reduce air quality. Stay away from strong odors and sprays, such as perfume, powder, hair spray, paints, smoke incense, paint, cleaning products, candles and new carpet.   Exercise or Sports  Some people with asthma have this trigger. Be active!  Ask your doctor about taking medicine before sports or exercise to prevent symptoms.    Warm up for 5-10 minutes before and after sports or exercise.     Other Triggers of Asthma  Cold air:  Cover your nose and mouth with a scarf.  Sometimes laughing or crying can be a trigger.  Some medicines and food can trigger asthma.

## 2020-10-07 ASSESSMENT — ASTHMA QUESTIONNAIRES: ACT_TOTALSCORE: 12

## 2020-10-08 LAB
ALBUMIN SERPL-MCNC: 4.6 G/DL (ref 4–5)
ALBUMIN/GLOB SERPL: 1.8 {RATIO} (ref 1.2–2.2)
ALP SERPL-CCNC: 62 IU/L (ref 39–117)
ALT SERPL-CCNC: 19 IU/L (ref 0–44)
AST SERPL-CCNC: 20 IU/L (ref 0–40)
BILIRUB SERPL-MCNC: 0.7 MG/DL (ref 0–1.2)
BUN SERPL-MCNC: 15 MG/DL (ref 6–24)
BUN/CREATININE RATIO: 16 (ref 9–20)
CALCIUM SERPL-MCNC: 9.6 MG/DL (ref 8.7–10.2)
CHLORIDE SERPLBLD-SCNC: 101 MMOL/L (ref 96–106)
CHOLEST SERPL-MCNC: 187 MG/DL (ref 100–199)
CREAT SERPL-MCNC: 0.91 MG/DL (ref 0.76–1.27)
EGFR IF AFRICN AM: 115 ML/MIN/1.73
EGFR IF NONAFRICN AM: 99 ML/MIN/1.73
GLOBULIN, TOTAL: 2.5 G/DL (ref 1.5–4.5)
GLUCOSE SERPL-MCNC: 95 MG/DL (ref 65–99)
HDLC SERPL-MCNC: 45 MG/DL
LDL/HDL RATIO: 2.6 RATIO (ref 0–3.6)
LDLC SERPL CALC-MCNC: 117 MG/DL (ref 0–99)
POTASSIUM SERPL-SCNC: 4.3 MMOL/L (ref 3.5–5.2)
PROT SERPL-MCNC: 7.1 G/DL (ref 6–8.5)
PSA NG/ML: 1.1 NG/ML (ref 0–4)
SODIUM SERPL-SCNC: 139 MMOL/L (ref 134–144)
TOTAL CO2: 27 MMOL/L (ref 20–29)
TRIGL SERPL-MCNC: 141 MG/DL (ref 0–149)
VLDLC SERPL CALC-MCNC: 25 MG/DL (ref 5–40)

## 2020-11-17 DIAGNOSIS — K21.9 GASTROESOPHAGEAL REFLUX DISEASE WITHOUT ESOPHAGITIS: ICD-10-CM

## 2020-11-23 DIAGNOSIS — J01.01 ACUTE RECURRENT MAXILLARY SINUSITIS: ICD-10-CM

## 2020-11-23 DIAGNOSIS — J45.40 MODERATE PERSISTENT REACTIVE AIRWAY DISEASE WITHOUT COMPLICATION: ICD-10-CM

## 2020-11-23 RX ORDER — BUDESONIDE 0.25 MG/2ML
INHALANT ORAL
Qty: 120 ML | Refills: 11 | Status: SHIPPED | OUTPATIENT
Start: 2020-11-23 | End: 2021-12-15

## 2020-11-29 ENCOUNTER — HEALTH MAINTENANCE LETTER (OUTPATIENT)
Age: 50
End: 2020-11-29

## 2020-12-08 ENCOUNTER — TRANSFERRED RECORDS (OUTPATIENT)
Dept: FAMILY MEDICINE | Facility: CLINIC | Age: 50
End: 2020-12-08

## 2020-12-17 DIAGNOSIS — K21.9 GASTROESOPHAGEAL REFLUX DISEASE WITHOUT ESOPHAGITIS: ICD-10-CM

## 2021-01-20 ENCOUNTER — TRANSFERRED RECORDS (OUTPATIENT)
Dept: FAMILY MEDICINE | Facility: CLINIC | Age: 51
End: 2021-01-20

## 2021-04-20 DIAGNOSIS — K21.9 GASTROESOPHAGEAL REFLUX DISEASE WITHOUT ESOPHAGITIS: ICD-10-CM

## 2021-04-28 ENCOUNTER — TELEPHONE (OUTPATIENT)
Dept: FAMILY MEDICINE | Facility: CLINIC | Age: 51
End: 2021-04-28

## 2021-05-07 NOTE — TELEPHONE ENCOUNTER
Called and spoke with patient and did ACT over the phone. Patient also notified that he is due for a physical or medication check with Dr. Rivera soon. He will schedule this now.  Humaira Correia, MIKEL  May 7, 2021

## 2021-05-08 ASSESSMENT — ASTHMA QUESTIONNAIRES: ACT_TOTALSCORE: 21

## 2021-07-22 NOTE — PATIENT INSTRUCTIONS
"Thank you for coming in today!   If you receive a survey via My Chart, mail or phone, please let us know if there was anything you especially appreciated today or if there is any way we can improve our clinic. We value your input.     Likewise, we are working hard to attend to our digital reputation.    Please consider reviewing our clinic on Google and/or Facebook via the following links:    [email or web address]    We truly appreciate you taking the time to do this!    General Information:    Today you had your appointment with Viral Rivera MD    I am in the clinic:  Monday and Friday mornings  Tuesday and Thursday afternoons    I am not in the office Wednesdays, non urgent calls received on Wednesday will be addressed when I am back in the office on Thursday.    If lab work was done today as part of your evaluation you will generally be contacted via My Chart, mail, or phone with the results within 1-5 days. If there is an alarming result we will contact you by phone. Lab results come back at varying times, I generally wait until all labs are resulted before making comments on results. Please note labs are automatically released to My Chart once available.    If you need refills please contact your pharmacist. They will send a refill request to me to review. Please allow 3 business days for us to process all refill requests.    Please call or send a medical message with any questions or concerns.    Thank you for choosing [facility].  We truly appreciate you trusting us with your medical care.    Your next visit with us should be scheduled for Follow up in 1 year.     Listed next are the medical health Maintenance items we are tracking for your care and when they are next due (or overdue!)  Our goal is 100% \"up to date.\" Keep this list handy to call and schedule what you are due for in the future!    Health " Maintenance Due   Topic Date Due     Pneumococcal Vaccine: Pediatrics (0 to 5 Years) and At-Risk Patients (6 to 64 Years) (1 of 2 - PPSV23) Never done     HIV SCREENING  Never done     HEPATITIS C SCREENING  Never done     DTAP/TDAP/TD IMMUNIZATION (2 - Td or Tdap) 08/08/2021       Sincerely,    Viral Rivera MD    Preventive Health Recommendations  Male Ages 40 to 49    Yearly exam:             See your health care provider every year in order to  o   Review health changes.   o   Discuss preventive care.    o   Review your medicines if your doctor has prescribed any.    You should be tested each year for STDs (sexually transmitted diseases) if you re at risk.     Have a cholesterol test every 5 years.     Have a colonoscopy (test for colon cancer) if someone in your family has had colon cancer or polyps before age 50.     After age 45, have a diabetes test (fasting glucose). If you are at risk for diabetes, you should have this test every 3 years.      Talk with your health care provider about whether or not a prostate cancer screening test (PSA) is right for you.    Shots: Get a flu shot each year. Get a tetanus shot every 10 years.     Nutrition:    Eat at least 5 servings of fruits and vegetables daily.     Eat whole-grain bread, whole-wheat pasta and brown rice instead of white grains and rice.     Get adequate Calcium and Vitamin D.     Lifestyle    Exercise for at least 150 minutes a week (30 minutes a day, 5 days a week). This will help you control your weight and prevent disease.     Limit alcohol to one drink per day.     No smoking.     Wear sunscreen to prevent skin cancer.     See your dentist every six months for an exam and cleaning.

## 2021-07-22 NOTE — PROGRESS NOTES
3  SUBJECTIVE:   CC: Dion Thomson is an 50 year old male who presents for preventive health visit.     Asthma stable.  Has not had any recent breathing troubles beyond usual baseline.  Has not any recent acute respiratory events.  Using medication as directed with reported side effects    ACT Total Scores 6/3/2019 10/6/2020 5/7/2021   ACT TOTAL SCORE - - -   ASTHMA ER VISITS - - -   ASTHMA HOSPITALIZATIONS - - -   ACT TOTAL SCORE (Goal Greater than or Equal to 20) 21 12 21   In the past 12 months, how many times did you visit the emergency room for your asthma without being admitted to the hospital? 0 0 0   In the past 12 months, how many times were you hospitalized overnight because of your asthma? 0 0 0     Sinus symptoms continue but is much better with the budesonide that he mixes with saline and instills.    Hyperlipidemia:  Has history of hyperlipidemia.    The patient is taking a medication for this.  Denies any significant side effects from his medication.      Latest labs reviewed:    Recent Labs   Lab Test 10/06/20  1630 09/21/18  0933   CHOL 187 150   HDL 45 39*   * 83   TRIG 141 142        Lab Results   Component Value Date    AST 20 10/06/2020      GERD stable.  Taking meds as ordered, no reported side effects from medicines.  Eating properly to avoid sx.   No melena, no hematochezia, no diarrhea.  No unintended weigth loss.  Social history: no or minimal alcohol, nonsmoker, no or mild caffeine use, no ASA or NSAID's.    Patient has been advised of split billing requirements and indicates understanding: Yes  Healthy Habits:    Do you get at least three servings of calcium containing foods daily (dairy, green leafy vegetables, etc.)? yes and vitamin D    Amount of exercise or daily activities, outside of work: 2-3 day(s) per week    Problems taking medications regularly No    Medication side effects: No    Have you had an eye exam in the past two years? no    Do you see a dentist twice per year?  yes    Do you have sleep apnea, excessive snoring or daytime drowsiness?sleep apnea - on CPAP      Breathing issues  Hip pain left    Today's PHQ-2 Score:   PHQ-2 ( 1999 [employer]) 7/26/2021 10/6/2020   Q1: Little interest or pleasure in doing things 0 0   Q2: Feeling down, depressed or hopeless 0 0   PHQ-2 Score 0 0       Abuse: Current or Past(Physical, Sexual or Emotional)- No  Do you feel safe in your environment? Yes    Have you ever done Advance Care Planning? (For example, a Health Directive, POLST, or a discussion with a medical provider or your loved ones about your wishes): No, advance care planning information given to patient to review.  Patient plans to discuss their wishes with loved ones or provider.      Social History     Tobacco Use     Smoking status: Never Smoker     Smokeless tobacco: Never Used   Substance Use Topics     Alcohol use: Yes     Alcohol/week: 0.0 - 0.8 standard drinks     Comment: Occasional     If you drink alcohol do you typically have >3 drinks per day or >7 drinks per week? No                      Last PSA: No results found for: PSA    Reviewed orders with patient. Reviewed health maintenance and updated orders accordingly - Yes  Labs reviewed in EPIC    Reviewed and updated as needed this visit by clinical staff  Tobacco  Allergies  Meds              Reviewed and updated as needed this visit by Provider                Past Medical History:   Diagnosis Date     Allergic rhinitis      Anxiety      Chronic sinusitis      Chronic sinusitis      Gastroesophageal reflux disease      Hypercholesteremia      Intermittent asthma 12/20/2013     Obstructive sleep apnea      URBANO (obstructive sleep apnea) 12/19/2014      Past Surgical History:   Procedure Laterality Date     ARTHROSCOPIC RECONSTRUCTION ANTERIOR CRUCIATE LIGAMENT  1997    Open, Knee Left     ARTHROSCOPY KNEE RT/LT Left 04/2005    meniscus repair     EXTRACTION(S) DENTAL       OPTICAL TRACKING SYSTEM ENDOSCOPIC SINUS SURGERY  "Bilateral 9/30/2019    Procedure: Image Guided Bilateral Functional Endoscopic Sinus Surgery, Bilateral turbinate reduction;  Surgeon: Willi Garcia MD;  Location: [facility] OR     TONSILLECTOMY & ADENOIDECTOMY  5 yo     VASECTOMY      Vasectomy       ROS:  CONSTITUTIONAL: NEGATIVE for fever, chills, change in weight  INTEGUMENTARY/SKIN: NEGATIVE for worrisome rashes, moles or lesions  EYES: NEGATIVE for vision changes or irritation  ENT: NEGATIVE for ear, mouth and throat problems  RESP: NEGATIVE for significant cough or SOB  CV: NEGATIVE for chest pain, palpitations or peripheral edema  GI: NEGATIVE for nausea, abdominal pain, heartburn, or change in bowel habits   male: negative for dysuria, hematuria, decreased urinary stream, erectile dysfunction, urethral discharge  MUSCULOSKELETAL: NEGATIVE for significant arthralgias or myalgia Left hip can get sore.  NEURO: NEGATIVE for weakness, dizziness or paresthesias  PSYCHIATRIC: NEGATIVE for changes in mood or affect    OBJECTIVE:   /84   Pulse 73   Resp 16   Ht 1.753 m (5' 9\")   Wt 99.8 kg (220 lb)   SpO2 97%   BMI 32.49 kg/m    EXAM:  GENERAL: healthy, alert and no distress  EYES: Eyes grossly normal to inspection, PERRL and conjunctivae and sclerae normal  HENT: ear canals and TM's normal, nose and mouth without ulcers or lesions  NECK: no adenopathy, no asymmetry, masses, or scars and thyroid normal to palpation  RESP: lungs clear to auscultation - no rales, rhonchi or wheezes  CV: regular rate and rhythm, normal S1 S2, no S3 or S4, no murmur, click or rub, no peripheral edema and peripheral pulses strong  ABDOMEN: soft, nontender, no hepatosplenomegaly, no masses and bowel sounds normal   (male): normal male genitalia without lesions or urethral discharge, no hernia  RECTAL: normal sphincter tone, no rectal masses, prostate normal size, smooth, nontender without nodules or masses  MS: no gross musculoskeletal defects noted, no edema  SKIN: no " suspicious lesions or rashes  NEURO: Normal strength and tone, mentation intact and speech normal  PSYCH: mentation appears normal, affect normal/bright    Diagnostic Test Results:  Labs reviewed in Epic    ASSESSMENT/PLAN:   Dion was seen today for physical.    Diagnoses and all orders for this visit:    Routine general medical examination at a health care facility    Need for 23-polyvalent pneumococcal polysaccharide vaccine  due  -     PPSV23, IM/SUBQ (2+ YRS) - [id number]  -     VACCINE ADMINISTRATION, INITIAL    Mild intermittent asthma without complication  Doing much better currently    URBANO (obstructive sleep apnea)  Doing well with current set up.  Feels rested and feels things are working well.  Averaging 6 hours a night.  No need for any new supplies.      Gastroesophageal reflux disease with esophagitis without hemorrhage  Discussed the functional nature of this condition regarding what they eat, how they eat, when they eat, and how much they eat as all contributing to gastroesophageal symptoms.    Recommend anti-reflux diet and eating patterns emphasizing smaller more frequent meals and timing relative to bedtime.  Also recommend avoiding tomatoes, onions, spicy foods, caffeine, or any other food/beverage that cause increased reflux.    If symptoms not controlled on current therapies, then need to return for further evaluation and consideration of further studies.    -     CBC with Diff/Plt ([facility])  -     Comp. Metabolic Panel (14) ([facility])    Hypercholesteremia  Hyperlipidemia  Discussed current lipid results, previous results (if available) current guidelines (NCEP) for treatment and goals for lipids.    Discussed ongoing lifestyle modification, dietary changes (low fat, low simple carb) and regular aerobic exercise.    Discussed the link between dysmetabolic syndrome and impaired glucose tolerance seen in certain patterns of lipids.     Reviewed medication use for lipid lowering, including the  "statins are their possible side effects of myalgias, rhabdomyolysis, and liver toxicity.  We today managed his prescriptions with refills ensured to ensure availabilty of current medications.  Discussed the importance for aggressive management of dyslipidemia to prevent vascular complications later.     Instructed to contact me if he develop any intolerance to the treatment.    -     Lipid Panel ([facility])    Screening for prostate cancer  -     PSA Serum ([facility])        Patient has been advised of split billing requirements and indicates understanding: Yes  COUNSELING:  Reviewed preventive health counseling, as reflected in patient instructions       Regular exercise       Healthy diet/nutrition    Estimated body mass index is 32.49 kg/m  as calculated from the following:    Height as of this encounter: 1.753 m (5' 9\").    Weight as of this encounter: 99.8 kg (220 lb).    Weight management plan: Discussed healthy diet and exercise guidelines    He reports that he has never smoked. He has never used smokeless tobacco.      Counseling Resources:  ATP IV Guidelines  Pooled Cohorts Equation Calculator  FRAX Risk Assessment  ICSI Preventive Guidelines  Dietary Guidelines for Americans, 2010  USDA's MyPlate  ASA Prophylaxis  Lung CA Screening    Viral Rivera MD  [facility]  "

## 2021-07-26 ENCOUNTER — OFFICE VISIT (OUTPATIENT)
Dept: FAMILY MEDICINE | Facility: CLINIC | Age: 51
End: 2021-07-26

## 2021-07-26 VITALS
RESPIRATION RATE: 16 BRPM | DIASTOLIC BLOOD PRESSURE: 84 MMHG | WEIGHT: 220 LBS | SYSTOLIC BLOOD PRESSURE: 112 MMHG | OXYGEN SATURATION: 97 % | HEART RATE: 73 BPM | BODY MASS INDEX: 32.58 KG/M2 | HEIGHT: 69 IN

## 2021-07-26 DIAGNOSIS — Z12.5 SCREENING FOR PROSTATE CANCER: ICD-10-CM

## 2021-07-26 DIAGNOSIS — Z00.00 ROUTINE GENERAL MEDICAL EXAMINATION AT A HEALTH CARE FACILITY: Primary | ICD-10-CM

## 2021-07-26 DIAGNOSIS — G47.33 OSA (OBSTRUCTIVE SLEEP APNEA): ICD-10-CM

## 2021-07-26 DIAGNOSIS — J45.20 MILD INTERMITTENT ASTHMA WITHOUT COMPLICATION: ICD-10-CM

## 2021-07-26 DIAGNOSIS — Z23 NEED FOR 23-POLYVALENT PNEUMOCOCCAL POLYSACCHARIDE VACCINE: ICD-10-CM

## 2021-07-26 DIAGNOSIS — K21.00 GASTROESOPHAGEAL REFLUX DISEASE WITH ESOPHAGITIS WITHOUT HEMORRHAGE: ICD-10-CM

## 2021-07-26 DIAGNOSIS — E78.00 HYPERCHOLESTEREMIA: ICD-10-CM

## 2021-07-26 LAB
% GRANULOCYTES: 77.3 % (ref 42.2–75.2)
HCT VFR BLD AUTO: 39.9 % (ref 39–51)
HEMOGLOBIN: 14 G/DL (ref 13.4–17.5)
LYMPHOCYTES NFR BLD AUTO: 16.3 % (ref 20.5–51.1)
MCH RBC QN AUTO: 28.6 PG (ref 27–31)
MCHC RBC AUTO-ENTMCNC: 35.2 G/DL (ref 33–37)
MCV RBC AUTO: 81.2 FL (ref 80–100)
MONOCYTES NFR BLD AUTO: 6.4 % (ref 1.7–9.3)
PLATELET # BLD AUTO: 227 K/UL (ref 140–450)
RBC # BLD AUTO: 4.92 X10/CMM (ref 4.2–5.9)
WBC # BLD AUTO: 6.9 X10/CMM (ref 3.8–11)

## 2021-07-26 PROCEDURE — 99212 OFFICE O/P EST SF 10 MIN: CPT | Mod: 25 | Performed by: FAMILY MEDICINE

## 2021-07-26 PROCEDURE — 90732 PPSV23 VACC 2 YRS+ SUBQ/IM: CPT | Performed by: FAMILY MEDICINE

## 2021-07-26 PROCEDURE — 90471 IMMUNIZATION ADMIN: CPT | Mod: 59 | Performed by: FAMILY MEDICINE

## 2021-07-26 PROCEDURE — 99396 PREV VISIT EST AGE 40-64: CPT | Performed by: FAMILY MEDICINE

## 2021-07-26 PROCEDURE — 36415 COLL VENOUS BLD VENIPUNCTURE: CPT | Performed by: FAMILY MEDICINE

## 2021-07-26 PROCEDURE — 85025 COMPLETE CBC W/AUTO DIFF WBC: CPT | Performed by: FAMILY MEDICINE

## 2021-07-26 RX ORDER — SODIUM CHLORIDE FOR INHALATION 0.9 %
VIAL, NEBULIZER (ML) INHALATION
COMMUNITY
Start: 2021-05-21

## 2021-07-26 ASSESSMENT — MIFFLIN-ST. JEOR: SCORE: 1858.29

## 2021-07-27 LAB
ALBUMIN SERPL-MCNC: 4.5 G/DL (ref 4–5)
ALBUMIN/GLOB SERPL: 1.9 {RATIO} (ref 1.2–2.2)
ALP SERPL-CCNC: 58 IU/L (ref 48–121)
ALT SERPL-CCNC: 30 IU/L (ref 0–44)
AST SERPL-CCNC: 70 IU/L (ref 0–40)
BILIRUB SERPL-MCNC: 0.4 MG/DL (ref 0–1.2)
BUN SERPL-MCNC: 15 MG/DL (ref 6–24)
BUN/CREATININE RATIO: 17 (ref 9–20)
CALCIUM SERPL-MCNC: 9.1 MG/DL (ref 8.7–10.2)
CHLORIDE SERPLBLD-SCNC: 104 MMOL/L (ref 96–106)
CHOLEST SERPL-MCNC: 171 MG/DL (ref 100–199)
CREAT SERPL-MCNC: 0.87 MG/DL (ref 0.76–1.27)
EGFR IF AFRICN AM: 118 ML/MIN/1.73
EGFR IF NONAFRICN AM: 102 ML/MIN/1.73
GLOBULIN, TOTAL: 2.4 G/DL (ref 1.5–4.5)
GLUCOSE SERPL-MCNC: 109 MG/DL (ref 65–99)
HDLC SERPL-MCNC: 43 MG/DL
LDL/HDL RATIO: 2.4 RATIO (ref 0–3.6)
LDLC SERPL CALC-MCNC: 104 MG/DL (ref 0–99)
POTASSIUM SERPL-SCNC: 4 MMOL/L (ref 3.5–5.2)
PROT SERPL-MCNC: 6.9 G/DL (ref 6–8.5)
PSA NG/ML: 1.1 NG/ML (ref 0–4)
SODIUM SERPL-SCNC: 141 MMOL/L (ref 134–144)
TOTAL CO2: 23 MMOL/L (ref 20–29)
TRIGL SERPL-MCNC: 134 MG/DL (ref 0–149)
VLDLC SERPL CALC-MCNC: 24 MG/DL (ref 5–40)

## 2021-07-30 ENCOUNTER — TELEPHONE (OUTPATIENT)
Dept: FAMILY MEDICINE | Facility: CLINIC | Age: 51
End: 2021-07-30

## 2021-07-30 DIAGNOSIS — R74.01 ELEVATED AST (SGOT): Primary | ICD-10-CM

## 2021-07-30 NOTE — TELEPHONE ENCOUNTER
----- Message from Viral Rivera MD sent at 7/30/2021 12:59 PM CDT -----  AST was mildly elevated.  I would like to repeat the test/  KN

## 2021-07-30 NOTE — CONFIDENTIAL NOTE
Patient informed of results and recommendation to repeat AST. Patient uses acetaminophen intermittently, not regularly or excessively. ETOH=occasional.   Patient busy with work and vacation over next couple weeks. Repeat lab appt scheduled for 8/24/21.  Future order placed.  Minda Briggs RN

## 2021-08-24 DIAGNOSIS — R74.01 ELEVATED AST (SGOT): ICD-10-CM

## 2021-08-24 PROCEDURE — 36415 COLL VENOUS BLD VENIPUNCTURE: CPT | Performed by: FAMILY MEDICINE

## 2021-08-24 NOTE — LETTER
[facility] [address] [place]  [address]  Phone: [phone]    August 25, 2021      Dion Thomson  [address] [place] [id number]              Dear Dion,     I am writing to report that your included test results are within expected ranges. I do not suggest that we make any changes at this time.     Viral Rivera M.D./jose r      Results for orders placed or performed in visit on 08/24/21   Hepatic Function Panel (7) ([facility])     Status: None   Result Value Ref Range    Protein Total 7.0 6.0 - 8.5 g/dL    Albumin 4.4 4.0 - 5.0 g/dL    Bilirubin Total 0.4 0.0 - 1.2 mg/dL    Bilirubin Direct 0.09 0.00 - 0.40 mg/dL    Alkaline Phosphatase 57 48 - 121 IU/L    AST 18 0 - 40 IU/L    ALT 17 0 - 44 IU/L    Narrative    Performed at:  01 - [facility] [address], [place], [place]  [id number]  : Wesley Hwang MD, Phone:  [phone]

## 2021-08-25 LAB
ALBUMIN SERPL-MCNC: 4.4 G/DL (ref 4–5)
ALP SERPL-CCNC: 57 IU/L (ref 48–121)
ALT SERPL-CCNC: 17 IU/L (ref 0–44)
AST SERPL-CCNC: 18 IU/L (ref 0–40)
BILIRUB SERPL-MCNC: 0.4 MG/DL (ref 0–1.2)
BILIRUBIN, DIRECT: 0.09 MG/DL (ref 0–0.4)
PROT SERPL-MCNC: 7 G/DL (ref 6–8.5)

## 2021-08-27 DIAGNOSIS — K21.9 GASTROESOPHAGEAL REFLUX DISEASE WITHOUT ESOPHAGITIS: ICD-10-CM

## 2021-08-27 NOTE — TELEPHONE ENCOUNTER
Omeprazole. Last addressed 7/26/21.       GERD stable.  Taking meds as ordered, no reported side effects from medicines.  Eating properly to avoid sx.   No melena, no hematochezia, no diarrhea.  No unintended weigth loss.  Social history: no or minimal alcohol, nonsmoker, no or mild caffeine use, no ASA or NSAID's.

## 2021-09-19 ENCOUNTER — HEALTH MAINTENANCE LETTER (OUTPATIENT)
Age: 51
End: 2021-09-19

## 2021-10-05 DIAGNOSIS — E78.00 HYPERCHOLESTEREMIA: ICD-10-CM

## 2021-10-05 RX ORDER — ATORVASTATIN CALCIUM 10 MG/1
TABLET, FILM COATED ORAL
Qty: 90 TABLET | Refills: 3 | Status: SHIPPED | OUTPATIENT
Start: 2021-10-05 | End: 2022-10-09

## 2021-10-05 NOTE — TELEPHONE ENCOUNTER
Atorvastatin  LOV 7/26/21    Cholesterol   Date Value Ref Range Status   07/26/2021 171 100 - 199 mg/dL Final   10/06/2020 187 100 - 199 mg/dL Final     HDL Cholesterol   Date Value Ref Range Status   07/26/2021 43 >39 mg/dL Final   10/06/2020 45 >39 mg/dL Final     LDL Cholesterol Calculated   Date Value Ref Range Status   07/26/2021 104 (H) 0 - 99 mg/dL Final   10/06/2020 117 (H) 0 - 99 mg/dL Final     Triglycerides   Date Value Ref Range Status   07/26/2021 134 0 - 149 mg/dL Final   10/06/2020 141 0 - 149 mg/dL Final     No results found for: CHOLHDLRATIO

## 2021-12-13 DIAGNOSIS — J45.40 MODERATE PERSISTENT REACTIVE AIRWAY DISEASE WITHOUT COMPLICATION: ICD-10-CM

## 2021-12-15 RX ORDER — BUDESONIDE 0.25 MG/2ML
INHALANT ORAL
Qty: 120 ML | Refills: 1 | Status: SHIPPED | OUTPATIENT
Start: 2021-12-15 | End: 2022-02-07

## 2021-12-15 NOTE — TELEPHONE ENCOUNTER
BUDESONIDE 0.25 MG/2 ML SUSP      Last Written Prescription Date:  11-23-20  Last Fill Quantity: 120 ml,   # refills: 11  Last Office Visit : 12-9-19  Future Office visit:  none    Routing refill request to provider for review/approval because:  Failed protocol: past due ACT  Last appt > 18 months      Scheduling has been notified to contact the pt for appointment.

## 2021-12-23 DIAGNOSIS — K21.9 GASTROESOPHAGEAL REFLUX DISEASE WITHOUT ESOPHAGITIS: ICD-10-CM

## 2021-12-23 NOTE — TELEPHONE ENCOUNTER
Omeprazole. Last addressed 7/26/21.       Gastroesophageal reflux disease with esophagitis without hemorrhage  Discussed the functional nature of this condition regarding what they eat, how they eat, when they eat, and how much they eat as all contributing to gastroesophageal symptoms.    Recommend anti-reflux diet and eating patterns emphasizing smaller more frequent meals and timing relative to bedtime.  Also recommend avoiding tomatoes, onions, spicy foods, caffeine, or any other food/beverage that cause increased reflux.    If symptoms not controlled on current therapies, then need to return for further evaluation and consideration of further studies.

## 2022-02-07 ENCOUNTER — OFFICE VISIT (OUTPATIENT)
Dept: OTOLARYNGOLOGY | Facility: CLINIC | Age: 52
End: 2022-02-07
Payer: COMMERCIAL

## 2022-02-07 VITALS
WEIGHT: 215 LBS | HEIGHT: 69 IN | SYSTOLIC BLOOD PRESSURE: 125 MMHG | HEART RATE: 80 BPM | DIASTOLIC BLOOD PRESSURE: 75 MMHG | OXYGEN SATURATION: 97 % | BODY MASS INDEX: 31.84 KG/M2 | TEMPERATURE: 99.1 F

## 2022-02-07 DIAGNOSIS — J45.40 MODERATE PERSISTENT REACTIVE AIRWAY DISEASE WITHOUT COMPLICATION: ICD-10-CM

## 2022-02-07 DIAGNOSIS — J33.9 NASAL POLYP: Primary | ICD-10-CM

## 2022-02-07 PROCEDURE — 99213 OFFICE O/P EST LOW 20 MIN: CPT | Performed by: OTOLARYNGOLOGY

## 2022-02-07 RX ORDER — PREDNISONE 20 MG/1
TABLET ORAL
Qty: 15 TABLET | Refills: 0 | Status: SHIPPED | OUTPATIENT
Start: 2022-02-07 | End: 2022-02-17

## 2022-02-07 RX ORDER — BUDESONIDE 0.25 MG/2ML
INHALANT ORAL
Qty: 240 ML | Refills: 11 | Status: SHIPPED | OUTPATIENT
Start: 2022-02-07 | End: 2023-04-11

## 2022-02-07 ASSESSMENT — PAIN SCALES - GENERAL: PAINLEVEL: NO PAIN (0)

## 2022-02-07 ASSESSMENT — MIFFLIN-ST. JEOR: SCORE: 1830.61

## 2022-02-07 NOTE — PATIENT INSTRUCTIONS
"1. You were seen in the clinic today by Dr. Garcia. If you have any questions or concerns after your appointment, please call the clinic at [phone]. Press \"1\" for scheduling, press \"3\" for nurse advice.    2.   Plan to return the clinic in 2 months.       Candida Burrows LPN  [facility]  Department of Otolaryngology  [phone]    "

## 2022-02-07 NOTE — PROGRESS NOTES
HISTORY OF PRESENT ILLNESS:  Dion is back to see us today.  He underwent functional endoscopic sinus surgery in 2019 for nasal polyposis.  He has been doing budesonide irrigations with decent control, but some persistent sense of congestion high up in his nose.  He has not been on prednisone recently.  He does feel that his asthma has been somewhat worse recently.  He does not report any purulent rhinorrhea.    PHYSICAL EXAMINATION:  On examination, this is a 51-year-old gentleman in no acute distress.  Normal mood and affect, normal ability to communicate.  Alert and appropriate.  Breathing without difficulty or stridor.  Eyes are anicteric.  Skin of the head and neck appears normal.    Examination of the nose shows a relatively healthy nasal mucosa with some polyposis in the roof of the ethmoid cavity bilaterally.  This is not obstructive of his nasal airway, but may contribute to his sense of congestion and may cause some frontal outflow obstruction.    ASSESSMENT:  A 51-year-old with recurrent nasal polyposis.    PLAN:  At this point, the plan is for followup in a couple of months for recheck.  In addition, we will have him increase his budesonide irrigations to three times a day and we will have him come back to reassess and consider Dupixent.

## 2022-02-07 NOTE — NURSING NOTE
"Chief Complaint   Patient presents with     RECHECK     follow up per pt    Blood pressure 125/75, pulse 80, temperature 99.1  F (37.3  C), temperature source Temporal, height 1.753 m (5' 9\"), weight 97.5 kg (215 lb), SpO2 97 %. Anahi Ibanez, EMT  "

## 2022-02-07 NOTE — LETTER
2/7/2022       RE: Dion Thomson  [address] [place] [address]     Dear Colleague,    Thank you for referring your patient, Dion Thomson, to the [facility] [place] at [facility]. Please see a copy of my visit note below.    HISTORY OF PRESENT ILLNESS:  Dion is back to see us today.  He underwent functional endoscopic sinus surgery in 2019 for nasal polyposis.  He has been doing budesonide irrigations with decent control, but some persistent sense of congestion high up in his nose.  He has not been on prednisone recently.  He does feel that his asthma has been somewhat worse recently.  He does not report any purulent rhinorrhea.    PHYSICAL EXAMINATION:  On examination, this is a 51-year-old gentleman in no acute distress.  Normal mood and affect, normal ability to communicate.  Alert and appropriate.  Breathing without difficulty or stridor.  Eyes are anicteric.  Skin of the head and neck appears normal.    Examination of the nose shows a relatively healthy nasal mucosa with some polyposis in the roof of the ethmoid cavity bilaterally.  This is not obstructive of his nasal airway, but may contribute to his sense of congestion and may cause some frontal outflow obstruction.    ASSESSMENT:  A 51-year-old with recurrent nasal polyposis.    PLAN:  At this point, the plan is for followup in a couple of months for recheck.  In addition, we will have him increase his budesonide irrigations to three times a day and we will have him come back to reassess and consider Dupixent.        Willi Garcia MD

## 2022-02-15 ENCOUNTER — LAB REQUISITION (OUTPATIENT)
Dept: LAB | Facility: HOSPITAL | Age: 52
End: 2022-02-15

## 2022-02-15 LAB — SARS-COV-2 RNA RESP QL NAA+PROBE: NEGATIVE

## 2022-02-15 PROCEDURE — U0005 INFEC AGEN DETEC AMPLI PROBE: HCPCS | Performed by: INTERNAL MEDICINE

## 2022-03-13 ENCOUNTER — OFFICE VISIT (OUTPATIENT)
Dept: FAMILY MEDICINE | Facility: CLINIC | Age: 52
End: 2022-03-13
Payer: COMMERCIAL

## 2022-03-13 VITALS
OXYGEN SATURATION: 97 % | HEART RATE: 108 BPM | SYSTOLIC BLOOD PRESSURE: 116 MMHG | DIASTOLIC BLOOD PRESSURE: 73 MMHG | TEMPERATURE: 98.2 F

## 2022-03-13 DIAGNOSIS — J01.90 ACUTE SINUSITIS WITH SYMPTOMS GREATER THAN 10 DAYS: ICD-10-CM

## 2022-03-13 DIAGNOSIS — R06.2 WHEEZING: Primary | ICD-10-CM

## 2022-03-13 LAB
BASOPHILS # BLD AUTO: 0 10E3/UL (ref 0–0.2)
BASOPHILS NFR BLD AUTO: 0 %
EOSINOPHIL # BLD AUTO: 0.8 10E3/UL (ref 0–0.7)
EOSINOPHIL NFR BLD AUTO: 11 %
ERYTHROCYTE [DISTWIDTH] IN BLOOD BY AUTOMATED COUNT: 13 % (ref 10–15)
HCT VFR BLD AUTO: 45.4 % (ref 40–53)
HGB BLD-MCNC: 15 G/DL (ref 13.3–17.7)
IMM GRANULOCYTES # BLD: 0 10E3/UL
IMM GRANULOCYTES NFR BLD: 0 %
LYMPHOCYTES # BLD AUTO: 1.4 10E3/UL (ref 0.8–5.3)
LYMPHOCYTES NFR BLD AUTO: 19 %
MCH RBC QN AUTO: 28.8 PG (ref 26.5–33)
MCHC RBC AUTO-ENTMCNC: 33 G/DL (ref 31.5–36.5)
MCV RBC AUTO: 87 FL (ref 78–100)
MONOCYTES # BLD AUTO: 0.7 10E3/UL (ref 0–1.3)
MONOCYTES NFR BLD AUTO: 9 %
NEUTROPHILS # BLD AUTO: 4.6 10E3/UL (ref 1.6–8.3)
NEUTROPHILS NFR BLD AUTO: 61 %
PLATELET # BLD AUTO: 245 10E3/UL (ref 150–450)
RBC # BLD AUTO: 5.2 10E6/UL (ref 4.4–5.9)
WBC # BLD AUTO: 7.5 10E3/UL (ref 4–11)

## 2022-03-13 PROCEDURE — 36415 COLL VENOUS BLD VENIPUNCTURE: CPT | Performed by: PHYSICIAN ASSISTANT

## 2022-03-13 PROCEDURE — 99203 OFFICE O/P NEW LOW 30 MIN: CPT | Performed by: PHYSICIAN ASSISTANT

## 2022-03-13 PROCEDURE — 85025 COMPLETE CBC W/AUTO DIFF WBC: CPT | Performed by: PHYSICIAN ASSISTANT

## 2022-03-13 RX ORDER — ALBUTEROL SULFATE 1.25 MG/3ML
1.25 SOLUTION RESPIRATORY (INHALATION) 2 TIMES DAILY PRN
Status: DISCONTINUED | OUTPATIENT
Start: 2022-03-13 | End: 2022-04-01

## 2022-03-13 RX ORDER — ATORVASTATIN CALCIUM 10 MG/1
10 TABLET, FILM COATED ORAL DAILY
COMMUNITY
Start: 2022-02-12 | End: 2022-04-01

## 2022-03-13 RX ORDER — ALBUTEROL SULFATE 90 UG/1
2 AEROSOL, METERED RESPIRATORY (INHALATION) EVERY 6 HOURS
COMMUNITY
End: 2022-04-01

## 2022-03-13 RX ORDER — BUDESONIDE 0.25 MG/2ML
INHALANT ORAL
COMMUNITY
Start: 2022-02-24 | End: 2022-04-01

## 2022-03-13 RX ORDER — ALBUTEROL SULFATE 90 UG/1
2 AEROSOL, METERED RESPIRATORY (INHALATION) EVERY 4 HOURS PRN
Qty: 18 G | Refills: 0 | Status: SHIPPED | OUTPATIENT
Start: 2022-03-13 | End: 2022-04-01

## 2022-03-13 RX ORDER — FEXOFENADINE HCL 180 MG/1
180 TABLET ORAL DAILY
COMMUNITY
End: 2023-08-16

## 2022-03-13 NOTE — PROGRESS NOTES
Assessment & Plan     Wheezing  This is a common concominant symptom of patients sinus issues. Will treat with increased albuterol and patient advised to follow up with primary or specialists if ssx persist 3-5 days. Refill of albuterol inhaler and will send albuterol neb solution to the pharmacy.   - CBC with platelets and differential  - XR Chest 2 Views    Acute sinusitis with symptoms greater than 10 days  Acute on chronic condition, treat with Augmentin and f/u with ENT to discuss further treatment for polyps and sinus issues. Patient understands and voices understanding.    Tabatha Regalado PA-C  [facility] CECIL Ashley is a 51 year old who presents for 1.5 week history of cough - somewhat productive, wheezing, no nasal discharge, mild sore throat just in the morning, facial pressure, no nasal congestion, no fever. Has history of chronic sinus infections that lead to wheezing that he is treating with Albuterol and intranasal budesonide. He had similar symptoms 4 years ago and had a sinus infection and sinus surgery. Sees ENT and Pulm. He has had 4 negative COVID tests.     HPI     Review of Systems   Constitutional, HEENT, cardiovascular, pulmonary, gi and gu systems are negative, except as otherwise noted.      Objective    /73 (BP Location: Right arm, Patient Position: Sitting, Cuff Size: Adult Regular)   Pulse 108   Temp 98.2  F (36.8  C) (Tympanic)   SpO2 97%   There is no height or weight on file to calculate BMI.  Physical Exam   GENERAL: healthy, alert and no distress  HENT: ear canals and TM's normal, nose and mouth without ulcers or lesions  HENT: normal cephalic/atraumatic, ear canals and TM's normal, oropharynx clear, oral mucous membranes moist and sinuses: frontal tenderness on bilateral  NECK: cervical adenopathy present anteriorly and no asymmetry, masses, or scars  RESP: lungs clear to auscultation - no rales, rhonchi or wheezes, expiratory  wheezes bilateral and throughout and inspiratory wheezes bilateral and throughout  CV: regular rate and rhythm, normal S1 S2, no S3 or S4, no murmur, click or rub, no peripheral edema and peripheral pulses strong  SKIN: no suspicious lesions or rashes  NEURO: Normal strength and tone, mentation intact and speech normal  PSYCH: mentation appears normal, affect normal/bright    CXR - Reviewed and interpreted by me Normal- no infiltrates, effusions, pneumothoraces, cardiomegaly or masses  Results for orders placed or performed in visit on 03/13/22 (from the past 24 hour(s))   CBC with platelets and differential    Narrative    The following orders were created for panel order CBC with platelets and differential.  Procedure                               Abnormality         Status                     ---------                               -----------         ------                     CBC with platelets and d...[[id number]]  Abnormal            Final result                 Please view results for these tests on the individual orders.   CBC with platelets and differential   Result Value Ref Range    WBC Count 7.5 4.0 - 11.0 10e3/uL    RBC Count 5.20 4.40 - 5.90 10e6/uL    Hemoglobin 15.0 13.3 - 17.7 g/dL    Hematocrit 45.4 40.0 - 53.0 %    MCV 87 78 - 100 fL    MCH 28.8 26.5 - 33.0 pg    MCHC 33.0 31.5 - 36.5 g/dL    RDW 13.0 10.0 - 15.0 %    Platelet Count 245 150 - 450 10e3/uL    % Neutrophils 61 %    % Lymphocytes 19 %    % Monocytes 9 %    % Eosinophils 11 %    % Basophils 0 %    % Immature Granulocytes 0 %    Absolute Neutrophils 4.6 1.6 - 8.3 10e3/uL    Absolute Lymphocytes 1.4 0.8 - 5.3 10e3/uL    Absolute Monocytes 0.7 0.0 - 1.3 10e3/uL    Absolute Eosinophils 0.8 (H) 0.0 - 0.7 10e3/uL    Absolute Basophils 0.0 0.0 - 0.2 10e3/uL    Absolute Immature Granulocytes 0.0 <=0.4 10e3/uL   XR Chest 2 Views    Narrative    EXAM DATE:         03/13/2022    EXAM: X-RAY CHEST, 2 VIEWS, FRONTAL AND LATERAL  LOCATION: [place]  Radiology [facility]  DATE/TIME: 3/13/2022 1:00 PM    INDICATION: Wheezing  COMPARISON: None    IMPRESSION: Negative chest.

## 2022-03-15 ENCOUNTER — NURSE TRIAGE (OUTPATIENT)
Dept: NURSING | Facility: CLINIC | Age: 52
End: 2022-03-15
Payer: COMMERCIAL

## 2022-03-15 DIAGNOSIS — R06.2 WHEEZING: Primary | ICD-10-CM

## 2022-03-15 RX ORDER — ALBUTEROL SULFATE 0.83 MG/ML
2.5 SOLUTION RESPIRATORY (INHALATION) EVERY 6 HOURS PRN
Qty: 90 ML | Refills: 0 | Status: SHIPPED | OUTPATIENT
Start: 2022-03-15 | End: 2022-04-01

## 2022-03-15 NOTE — TELEPHONE ENCOUNTER
Patient says he was to get neb albuterol solution but was not called in.  Was seen Sunday.    Please call in to [facility] in [place] Goodson RN  [facility] Nurse Advisor

## 2022-03-15 NOTE — TELEPHONE ENCOUNTER
Seen Sunday.    See other chart.  Charts marked for merge.    Vita Goodson RN  [facility] Nurse Advisor

## 2022-04-01 ENCOUNTER — OFFICE VISIT (OUTPATIENT)
Dept: FAMILY MEDICINE | Facility: CLINIC | Age: 52
End: 2022-04-01

## 2022-04-01 VITALS
DIASTOLIC BLOOD PRESSURE: 72 MMHG | BODY MASS INDEX: 31.6 KG/M2 | WEIGHT: 214 LBS | SYSTOLIC BLOOD PRESSURE: 118 MMHG | TEMPERATURE: 97.8 F | HEART RATE: 86 BPM | OXYGEN SATURATION: 96 %

## 2022-04-01 DIAGNOSIS — R06.2 WHEEZING: ICD-10-CM

## 2022-04-01 DIAGNOSIS — J45.20 MILD INTERMITTENT ASTHMA WITHOUT COMPLICATION: Primary | ICD-10-CM

## 2022-04-01 PROCEDURE — 99214 OFFICE O/P EST MOD 30 MIN: CPT | Performed by: FAMILY MEDICINE

## 2022-04-01 RX ORDER — PREDNISONE 20 MG/1
TABLET ORAL
Qty: 20 TABLET | Refills: 0 | Status: SHIPPED | OUTPATIENT
Start: 2022-04-01 | End: 2023-05-27

## 2022-04-01 RX ORDER — ALBUTEROL SULFATE 90 UG/1
2 AEROSOL, METERED RESPIRATORY (INHALATION) EVERY 6 HOURS PRN
Qty: 18 G | Refills: 11 | Status: SHIPPED | OUTPATIENT
Start: 2022-04-01 | End: 2023-04-02

## 2022-04-01 RX ORDER — ALBUTEROL SULFATE 0.83 MG/ML
2.5 SOLUTION RESPIRATORY (INHALATION) EVERY 6 HOURS PRN
Qty: 90 ML | Refills: 0 | Status: SHIPPED | OUTPATIENT
Start: 2022-04-01

## 2022-04-01 RX ORDER — FLUTICASONE PROPIONATE AND SALMETEROL 113; 14 UG/1; UG/1
1 POWDER, METERED RESPIRATORY (INHALATION) 2 TIMES DAILY
Qty: 1 EACH | Refills: 11 | Status: SHIPPED | OUTPATIENT
Start: 2022-04-01 | End: 2023-04-08

## 2022-04-01 ASSESSMENT — ASTHMA QUESTIONNAIRES: ACT_TOTALSCORE: 8

## 2022-04-01 NOTE — LETTER
My Asthma Action Plan    Name: Dion Thomson   YOB: 1970  Date: 4/1/2022   My doctor: Viral Rivera MD   My clinic: [facility]        My Rescue Medicine:   Albuterol inhaler (Proair/Ventolin/Proventil HFA)  2-4 puffs EVERY 4 HOURS as needed. Use a spacer if recommended by your provider.   My Asthma Severity:   Intermittent / Exercise Induced  Know your asthma triggers: Patient is unaware of triggers             GREEN ZONE   Good Control    I feel good    No cough or wheeze    Can work, sleep and play without asthma symptoms       Take your asthma control medicine every day.     1. If exercise triggers your asthma, take your rescue medication    15 minutes before exercise or sports, and    During exercise if you have asthma symptoms  2. Spacer to use with inhaler: If you have a spacer, make sure to use it with your inhaler             YELLOW ZONE Getting Worse  I have ANY of these:    I do not feel good    Cough or wheeze    Chest feels tight    Wake up at night   1. Keep taking your Green Zone medications  2. Start taking your rescue medicine:    every 20 minutes for up to 1 hour. Then every 4 hours for 24-48 hours.  3. If you stay in the Yellow Zone for more than 12-24 hours, contact your doctor.  4. If you do not return to the Green Zone in 12-24 hours or you get worse, start taking your oral steroid medicine if prescribed by your provider.           RED ZONE Medical Alert - Get Help  I have ANY of these:    I feel awful    Medicine is not helping    Breathing getting harder    Trouble walking or talking    Nose opens wide to breathe       1. Take your rescue medicine NOW  2. If your provider has prescribed an oral steroid medicine, start taking it NOW  3. Call your doctor NOW  4. If you are still in the Red Zone after 20 minutes and you have not reached your doctor:    Take your rescue medicine again and    Call 911 or go to the emergency room right away    See your regular  doctor within 2 weeks of an Emergency Room or Urgent Care visit for follow-up treatment.          Annual Reminders:  Meet with Asthma Educator,  Flu Shot in the Fall, consider Pneumonia Vaccination for patients with asthma (aged 19 and older).    Pharmacy:    [id number] [place] [facility] [address] [facility] - [place], [place] - [address]     Electronically signed by Viral Rivera MD   Date: 04/01/22                    Asthma Triggers  How To Control Things That Make Your Asthma Worse    Triggers are things that make your asthma worse.  Look at the list below to help you find your triggers and   what you can do about them. You can help prevent asthma flare-ups by staying away from your triggers.      Trigger                                                          What you can do   Cigarette Smoke  Tobacco smoke can make asthma worse. Do not allow smoking in your home, car or around you.  Be sure no one smokes at a child s day care or school.  If you smoke, ask your health care provider for ways to help you quit.  Ask family members to quit too.  Ask your health care provider for a referral to Quit Plan to help you quit smoking, or call 8-841-426-PLAN.     Colds, Flu, Bronchitis  These are common triggers of asthma. Wash your hands often.  Don t touch your eyes, nose or mouth.  Get a flu shot every year.     Dust Mites  These are tiny bugs that live in cloth or carpet. They are too small to see. Wash sheets and blankets in hot water every week.   Encase pillows and mattress in dust mite proof covers.  Avoid having carpet if you can. If you have carpet, vacuum weekly.   Use a dust mask and HEPA vacuum.   Pollen and Outdoor Mold  Some people are allergic to trees, grass, or weed pollen, or molds. Try to keep your windows closed.  Limit time out doors when pollen count is high.   Ask you health care provider about taking medicine during allergy season.     Animal  Dander  Some people are allergic to skin flakes, urine or saliva from pets with fur or feathers. Keep pets with fur or feathers out of your home.    If you can t keep the pet outdoors, then keep the pet out of your bedroom.  Keep the bedroom door closed.  Keep pets off cloth furniture and away from stuffed toys.     Mice, Rats, and Cockroaches  Some people are allergic to the waste from these pests.   Cover food and garbage.  Clean up spills and food crumbs.  Store grease in the refrigerator.   Keep food out of the bedroom.   Indoor Mold  This can be a trigger if your home has high moisture. Fix leaking faucets, pipes, or other sources of water.   Clean moldy surfaces.  Dehumidify basement if it is damp and smelly.   Smoke, Strong Odors, and Sprays  These can reduce air quality. Stay away from strong odors and sprays, such as perfume, powder, hair spray, paints, smoke incense, paint, cleaning products, candles and new carpet.   Exercise or Sports  Some people with asthma have this trigger. Be active!  Ask your doctor about taking medicine before sports or exercise to prevent symptoms.    Warm up for 5-10 minutes before and after sports or exercise.     Other Triggers of Asthma  Cold air:  Cover your nose and mouth with a scarf.  Sometimes laughing or crying can be a trigger.  Some medicines and food can trigger asthma.

## 2022-04-01 NOTE — PROGRESS NOTES
Having regular wheezing throughout the day.  Wheezing worse at no specific time,in cold or humid air, leaves, dander.  Also more pronounced with physical activity, especially in the extremes of temperatures or increased levels of exertion.  Feels occasional tightness/mild restriction in breathing.  Specific triggers include heat/humidity, cold air, increased levels of physical exertion, dust/air pollutants, allergens.   Problem(s) Oriented visit      ROS:  General and Resp. completed and negative except as noted above     HISTORY:   reports current alcohol use.   reports that he has never smoked. He has never used smokeless tobacco.    Past Medical History:   Diagnosis Date     Allergic rhinitis      Anxiety      Chronic sinusitis      Chronic sinusitis      Gastroesophageal reflux disease      Hypercholesteremia      Intermittent asthma 12/20/2013     Obstructive sleep apnea      URBANO (obstructive sleep apnea) 12/19/2014     Past Surgical History:   Procedure Laterality Date     ARTHROSCOPIC RECONSTRUCTION ANTERIOR CRUCIATE LIGAMENT  1997    Open, Knee Left     ARTHROSCOPY KNEE RT/LT Left 04/2005    meniscus repair     EXTRACTION(S) DENTAL       OPTICAL TRACKING SYSTEM ENDOSCOPIC SINUS SURGERY Bilateral 9/30/2019    Procedure: Image Guided Bilateral Functional Endoscopic Sinus Surgery, Bilateral turbinate reduction;  Surgeon: Willi Garcia MD;  Location: [facility] OR     TONSILLECTOMY & ADENOIDECTOMY  5 yo     VASECTOMY      Vasectomy       EXAM:  BP: 118/72   Pulse: 86    Temp: 97.8    Wt Readings from Last 2 Encounters:   04/01/22 97.1 kg (214 lb)   02/07/22 97.5 kg (215 lb)       BMI= Body mass index is 31.6 kg/m .    EXAM:  APPEARANCE: = Relaxed and in no distress  Conj/Eyelids = noninjected and lids and lashes are without inflammation  PERRLA/Irises = Pupils Round Reactive to Light and Irisis without inflammation  Ears/Nose = External structures and Nares have usual shape and form  Ear canals and TM's = Canals are  not inflammed and have none or little wax and the drums are not injected and have a light reflex   Lips/Teeth/Gums = No lesions seen, good dentition, and gums seem healthy  Oropharynx = No leukoplakia, No injection to the tissues, Normal Uvula  Neck = No anterior or posterior adenopathy appreciated.  Resp effort = Calm regular breathing  Breath Sounds =  bilateral wheezing  Mood/Affect = Cooperative and interested      Assessment/Plan:  Dion was seen today for asthma and medication problem.    Diagnoses and all orders for this visit:    Mild intermittent asthma without complication  -     fluticasone-salmeterol (AIRDUO RESPICLICK) 113-14 MCG/ACT inhaler; Inhale 1 puff into the lungs 2 times daily  -     albuterol (PROAIR HFA/PROVENTIL HFA/VENTOLIN HFA) 108 (90 Base) MCG/ACT inhaler; Inhale 2 puffs into the lungs every 6 hours as needed for shortness of breath / dyspnea (Pt. last used months ago 09/26/2019)  -     predniSONE (DELTASONE) 20 MG tablet; Take 3 tabs by mouth daily x 3 days, then 2 tabs daily x 3 days, then 1 tab daily x 3 days, then 1/2 tab daily x 3 days.    Wheezing  -     albuterol (PROVENTIL) (2.5 MG/3ML) 0.083% neb solution; Take 1 vial (2.5 mg) by nebulization every 6 hours as needed for shortness of breath / dyspnea or wheezing  -     predniSONE (DELTASONE) 20 MG tablet; Take 3 tabs by mouth daily x 3 days, then 2 tabs daily x 3 days, then 1 tab daily x 3 days, then 1/2 tab daily x 3 days.    Discussed asthma in detail and discussed how their breathing symptoms and the pattern of the shortness of breath fits with asthma.   Discussed pathophysiology of asthma and treatments based on the degree of symptoms.   Discussed triggers for asthma in general, and those specific to the patient.  Also told them to anticipate potentially more intense coughing and shortness of breath with any URI (regardless of bacterial or viral etiology) and to be prepared to use the albuterol more often if needed and to  "return and see me for any such exacerbation.    I recommended having rescue inhaler available and using all throughout the year as needed, demonstrated proper MDI technique for them.  Emphasized the need for them to let me know if there are any changes for the worse in their breathing related to asthma, either acute or chronic and to seek emergency care if the breathing acutely worsens in a sever manner.        COUNSELING:   reports that he has never smoked. He has never used smokeless tobacco.    Estimated body mass index is 31.6 kg/m  as calculated from the following:    Height as of 2/7/22: 1.753 m (5' 9\").    Weight as of this encounter: 97.1 kg (214 lb).       Appropriate preventive services were discussed with this patient, including applicable screening as appropriate for cardiovascular disease, diabetes, osteopenia/osteoporosis, and glaucoma.  As appropriate for age/gender, discussed screening for colorectal cancer, prostate cancer, breast cancer, and cervical cancer. Checklist reviewing preventive services available has been given to the patient.    Reviewed patients plan of care and provided an AVS. The Basic Care Plan (routine screening as documented in Health Maintenance) for Dion meets the Care Plan requirement. This Care Plan has been established and reviewed with the  Patient.      The following health maintenance items are reviewed in Epic and correct as of today:  Health Maintenance   Topic Date Due     COLORECTAL CANCER SCREENING  Never done     HIV SCREENING  Never done     HEPATITIS C SCREENING  Never done     DTAP/TDAP/TD IMMUNIZATION (2 - Td or Tdap) 08/08/2021     PREVENTIVE CARE VISIT  07/26/2022     ZOSTER IMMUNIZATION (1 of 2) 09/29/2022     ASTHMA CONTROL TEST  10/01/2022     ASTHMA ACTION PLAN  04/01/2023     LIPID  07/26/2026     ADVANCE CARE PLANNING  07/26/2026     Pneumococcal Vaccine: Pediatrics (0 to 5 Years) and At-Risk Patients (6 to 64 Years) (2 of 2 - PPSV23) 09/29/2037     " PHQ-2 (once per calendar year)  Completed     INFLUENZA VACCINE  Completed     COVID-19 Vaccine  Completed     IPV IMMUNIZATION  Aged Out     MENINGITIS IMMUNIZATION  Aged Out     HEPATITIS B IMMUNIZATION  Aged Out       Viral Rivera  [facility]  For any issues my office # is [phone]

## 2022-04-11 ENCOUNTER — OFFICE VISIT (OUTPATIENT)
Dept: OTOLARYNGOLOGY | Facility: CLINIC | Age: 52
End: 2022-04-11
Payer: COMMERCIAL

## 2022-04-11 VITALS
DIASTOLIC BLOOD PRESSURE: 67 MMHG | WEIGHT: 219.5 LBS | HEART RATE: 75 BPM | SYSTOLIC BLOOD PRESSURE: 114 MMHG | HEIGHT: 69 IN | BODY MASS INDEX: 32.51 KG/M2

## 2022-04-11 DIAGNOSIS — J33.9 NASAL POLYP: Primary | ICD-10-CM

## 2022-04-11 DIAGNOSIS — J33.0 POLYP OF NASAL CAVITY: ICD-10-CM

## 2022-04-11 PROCEDURE — 99213 OFFICE O/P EST LOW 20 MIN: CPT | Performed by: OTOLARYNGOLOGY

## 2022-04-11 ASSESSMENT — PAIN SCALES - GENERAL: PAINLEVEL: NO PAIN (0)

## 2022-04-11 NOTE — PATIENT INSTRUCTIONS
"You were seen in the clinic today by Dr. Garcia. If you have any questions or concerns after your appointment, please call the clinic at [phone]. Press \"1\" for scheduling, press \"3\" for nurse advice.    2.   The following has been recommended for you based upon your appointment today:   -Dupixient injections.    3.   Plan to return the clinic in 3 months.       LEVAR Mendez  [facility]  Department of Otolaryngology  [phone] Gisselle Kam RN direct line   "

## 2022-04-11 NOTE — LETTER
4/11/2022       RE: Dion Thomson  [address] [place] [address]     Dear Colleague,    Thank you for referring your patient, Dion Thomson, to the [facility] EAR NOSE AND THROAT CLINIC [place] [facility]. Please see a copy of my visit note below.    HISTORY OF PRESENT ILLNESS:  Dion is back to see us again today.  He is doing fairly well when standing on budesonide irrigations.  Unfortunately, the insurance coverage for them is difficult and so it is somewhat of an extensive process.  He reports that if he stops taking the budesonide, his symptoms of nasal congestion become more notable.  He also has had some episodes of asthma exacerbation since we last saw him and had to be on some prednisone for that.    PHYSICAL EXAMINATION:  The patient is in no acute distress.  Normal mood, normal affect, normal ability to communicate.  Alert and appropriate.  Head is normocephalic.  Cranial nerve VII is House-Brackmann I/VI bilaterally.  Breathing without difficulty or stridor.  Eyes are anicteric.  Skin of the head and neck appears normal.    Examination of the nose shows moderate edematous mucosa within the middle meatus without evidence of purulence.    ASSESSMENT:  A 51-year-old with nasal polyposis, controlled to some extent with budesonide.  He also has asthma and has had some asthma exacerbations.     PLAN:  I have recommended at this point that try Dupixent and we will order this.  Otherwise, we will see him back in three months.          Again, thank you for allowing me to participate in the care of your patient.      Sincerely,    Willi Garcia MD

## 2022-04-11 NOTE — NURSING NOTE
"Chief Complaint   Patient presents with     RECHECK     Nasal polyps    Blood pressure 114/67, pulse 75, height 1.753 m (5' 9\"), weight 99.6 kg (219 lb 8 oz). Candida Burrows LPN    "

## 2022-04-11 NOTE — PROGRESS NOTES
HISTORY OF PRESENT ILLNESS:  Dion is back to see us again today.  He is doing fairly well when standing on budesonide irrigations.  Unfortunately, the insurance coverage for them is difficult and so it is somewhat of an extensive process.  He reports that if he stops taking the budesonide, his symptoms of nasal congestion become more notable.  He also has had some episodes of asthma exacerbation since we last saw him and had to be on some prednisone for that.    PHYSICAL EXAMINATION:  The patient is in no acute distress.  Normal mood, normal affect, normal ability to communicate.  Alert and appropriate.  Head is normocephalic.  Cranial nerve VII is House-Brackmann I/VI bilaterally.  Breathing without difficulty or stridor.  Eyes are anicteric.  Skin of the head and neck appears normal.    Examination of the nose shows moderate edematous mucosa within the middle meatus without evidence of purulence.    ASSESSMENT:  A 51-year-old with nasal polyposis, controlled to some extent with budesonide.  He also has asthma and has had some asthma exacerbations.     PLAN:  I have recommended at this point that try Dupixent and we will order this.  Otherwise, we will see him back in three months.

## 2022-04-20 DIAGNOSIS — K21.9 GASTROESOPHAGEAL REFLUX DISEASE WITHOUT ESOPHAGITIS: ICD-10-CM

## 2022-04-25 ENCOUNTER — PATIENT OUTREACH (OUTPATIENT)
Dept: OTOLARYNGOLOGY | Facility: CLINIC | Age: 52
End: 2022-04-25
Payer: COMMERCIAL

## 2022-04-25 ENCOUNTER — MYC MEDICAL ADVICE (OUTPATIENT)
Dept: OTOLARYNGOLOGY | Facility: CLINIC | Age: 52
End: 2022-04-25
Payer: COMMERCIAL

## 2022-04-25 NOTE — PROGRESS NOTES
Writer called patient to see if he has received Dupixent in the mail yet.     LM with direct call back number and will send a [employer] message as well.     Negin Costello RN on 4/25/2022 at 8:56 AM

## 2022-04-26 NOTE — TELEPHONE ENCOUNTER
Called patient to go over Dupixent injection. LM to return phone call.     Negin Costello RN on 4/26/2022 at 9:06 AM

## 2022-05-25 ENCOUNTER — LAB REQUISITION (OUTPATIENT)
Dept: LAB | Facility: HOSPITAL | Age: 52
End: 2022-05-25

## 2022-05-25 LAB — SARS-COV-2 RNA RESP QL NAA+PROBE: NEGATIVE

## 2022-05-25 PROCEDURE — U0005 INFEC AGEN DETEC AMPLI PROBE: HCPCS | Performed by: INTERNAL MEDICINE

## 2022-06-01 ENCOUNTER — LAB REQUISITION (OUTPATIENT)
Dept: LAB | Facility: HOSPITAL | Age: 52
End: 2022-06-01

## 2022-06-01 LAB — SARS-COV-2 RNA RESP QL NAA+PROBE: NEGATIVE

## 2022-06-01 PROCEDURE — U0005 INFEC AGEN DETEC AMPLI PROBE: HCPCS | Performed by: INTERNAL MEDICINE

## 2022-06-07 ENCOUNTER — LAB REQUISITION (OUTPATIENT)
Dept: LAB | Facility: HOSPITAL | Age: 52
End: 2022-06-07

## 2022-06-07 LAB — SARS-COV-2 RNA RESP QL NAA+PROBE: ABNORMAL

## 2022-06-07 PROCEDURE — U0005 INFEC AGEN DETEC AMPLI PROBE: HCPCS | Performed by: INTERNAL MEDICINE

## 2022-06-13 ENCOUNTER — LAB REQUISITION (OUTPATIENT)
Dept: LAB | Facility: HOSPITAL | Age: 52
End: 2022-06-13

## 2022-06-13 PROCEDURE — U0003 INFECTIOUS AGENT DETECTION BY NUCLEIC ACID (DNA OR RNA); SEVERE ACUTE RESPIRATORY SYNDROME CORONAVIRUS 2 (SARS-COV-2) (CORONAVIRUS DISEASE [COVID-19]), AMPLIFIED PROBE TECHNIQUE, MAKING USE OF HIGH THROUGHPUT TECHNOLOGIES AS DESCRIBED BY CMS-2020-01-R: HCPCS | Performed by: INTERNAL MEDICINE

## 2022-06-14 LAB — SARS-COV-2 RNA RESP QL NAA+PROBE: NEGATIVE

## 2022-06-15 ENCOUNTER — TELEPHONE (OUTPATIENT)
Dept: OTOLARYNGOLOGY | Facility: CLINIC | Age: 52
End: 2022-06-15

## 2022-06-15 NOTE — TELEPHONE ENCOUNTER
PA Initiation    Medication: Dupixent - pa pending  Insurance Company: [employer] - Phone [phone] Fax [phone]  Pharmacy Filling the Rx: [facility] - [place], [place] - [facility] [address]  Filling Pharmacy Phone: [phone]  Filling Pharmacy Fax: [phone]  Start Date: 6/15/2022

## 2022-07-11 ENCOUNTER — OFFICE VISIT (OUTPATIENT)
Dept: OTOLARYNGOLOGY | Facility: CLINIC | Age: 52
End: 2022-07-11
Payer: COMMERCIAL

## 2022-07-11 ENCOUNTER — TELEPHONE (OUTPATIENT)
Dept: OTOLARYNGOLOGY | Facility: CLINIC | Age: 52
End: 2022-07-11

## 2022-07-11 VITALS
SYSTOLIC BLOOD PRESSURE: 117 MMHG | TEMPERATURE: 97.8 F | HEART RATE: 79 BPM | BODY MASS INDEX: 32.47 KG/M2 | HEIGHT: 69 IN | WEIGHT: 219.2 LBS | DIASTOLIC BLOOD PRESSURE: 71 MMHG

## 2022-07-11 DIAGNOSIS — J33.9 NASAL POLYP: Primary | ICD-10-CM

## 2022-07-11 PROCEDURE — 99213 OFFICE O/P EST LOW 20 MIN: CPT | Performed by: OTOLARYNGOLOGY

## 2022-07-11 ASSESSMENT — PAIN SCALES - GENERAL: PAINLEVEL: NO PAIN (0)

## 2022-07-11 NOTE — PROGRESS NOTES
HISTORY OF PRESENT ILLNESS:  The patient is back to see us today for evaluation of his nasal polyposis.  He feels that his nose is doing better since the initiation of the Dupixent, but still has some congestion.  He feels the congestion goes back and forth between the sides of his nose.  He is able to smell.  He is not having any purulent rhinorrhea.    PHYSICAL EXAMINATION:  The patient is in no acute distress.  Normal mood and affect, normal ability to communicate.  Alert and appropriate.  Head is normocephalic.  Cranial nerve VII is House-Brackmann I/VI bilaterally.  Breathing without difficulty or stridor.  Eyes are anicteric.  Skin of the head and neck appears normal.  Examination of the nose shows some mild polypoid mucosa, but overall healthy and patent and improved in appearance.    ASSESSMENT:  A 51-year-old with history of recurrent nasal polyposis, doing better on Dupixent.      PLAN:  We will plan on continued monitoring and following up in two to three months.      In addition, the patient is having some issues with what appears to be turbinate congestion.  The plan will be for setting up a time to do a turbinate reduction in clinic in the future.

## 2022-07-11 NOTE — PATIENT INSTRUCTIONS
"You were seen in the clinic today by Dr. Garcia. If you have any questions or concerns after your appointment, please call the clinic at [phone]. Press \"1\" for scheduling, press \"3\" for nurse advice.    2.   Plan to return the clinic for an in clinic turbinate reduction.      LEVAR Mendez  [facility]  Department of Otolaryngology  [phone] -Candida Burrows LPN direct line   "

## 2022-07-11 NOTE — LETTER
7/11/2022       RE: Dion Thomson  [address] [place] [address]     Dear Colleague,    Thank you for referring your patient, Dion Thomson, to the [facility] EAR NOSE AND THROAT CLINIC [place] at [facility]. Please see a copy of my visit note below.    HISTORY OF PRESENT ILLNESS:  The patient is back to see us today for evaluation of his nasal polyposis.  He feels that his nose is doing better since the initiation of the Dupixent, but still has some congestion.  He feels the congestion goes back and forth between the sides of his nose.  He is able to smell.  He is not having any purulent rhinorrhea.    PHYSICAL EXAMINATION:  The patient is in no acute distress.  Normal mood and affect, normal ability to communicate.  Alert and appropriate.  Head is normocephalic.  Cranial nerve VII is House-Brackmann I/VI bilaterally.  Breathing without difficulty or stridor.  Eyes are anicteric.  Skin of the head and neck appears normal.  Examination of the nose shows some mild polypoid mucosa, but overall healthy and patent and improved in appearance.    ASSESSMENT:  A 51-year-old with history of recurrent nasal polyposis, doing better on Dupixent.      PLAN:  We will plan on continued monitoring and following up in two to three months.    In addition, the patient is having some issues with what appears to be turbinate congestion.  The plan will be for setting up a time to do a turbinate reduction in clinic in the future.    Again, thank you for allowing me to participate in the care of your patient.      Sincerely,    Willi Garcia MD

## 2022-07-11 NOTE — NURSING NOTE
"Chief Complaint   Patient presents with     RECHECK     Follow up    Blood pressure 117/71, pulse 79, temperature 97.8  F (36.6  C), temperature source Temporal, height 1.753 m (5' 9\"), weight 99.4 kg (219 lb 3.2 oz). Candida Burrows LPN    "

## 2022-07-12 ENCOUNTER — TELEPHONE (OUTPATIENT)
Dept: OTOLARYNGOLOGY | Facility: CLINIC | Age: 52
End: 2022-07-12

## 2022-07-12 NOTE — TELEPHONE ENCOUNTER
"LVM on both listed numbers after being unable to reach pt. Left direct line and ENT scheduling line for patient to call and make appointment.    SCHEDULING INSTRUCTIONS: Please schedule patient for 30 min RETURN visit or a PROCEDURE visit type in two consecutive 15 min return slots. Please include \" in clinic turbinate reductions\" in appointment notes.   "

## 2022-08-12 NOTE — TELEPHONE ENCOUNTER
Prior Authorization Approval    Authorization Effective Date: 8/11/2022  Authorization Expiration Date: 8/11/2023  Medication: Dupixent - approved  Approved Dose/Quantity: 4  Reference #: [id number]   Insurance Company: [employer] - Phone [phone] Fax [phone]  Expected CoPay:       CoPay Card Available:      Foundation Assistance Needed:    Which Pharmacy is filling the prescription (Not needed for infusion/clinic administered): [facility] - [place] [address]  Pharmacy Notified: Yes  Patient Notified: Yes

## 2022-08-13 DIAGNOSIS — K21.9 GASTROESOPHAGEAL REFLUX DISEASE WITHOUT ESOPHAGITIS: ICD-10-CM

## 2022-09-26 ENCOUNTER — OFFICE VISIT (OUTPATIENT)
Dept: OTOLARYNGOLOGY | Facility: CLINIC | Age: 52
End: 2022-09-26
Payer: COMMERCIAL

## 2022-09-26 VITALS
BODY MASS INDEX: 30.96 KG/M2 | DIASTOLIC BLOOD PRESSURE: 76 MMHG | TEMPERATURE: 98.3 F | OXYGEN SATURATION: 96 % | HEIGHT: 69 IN | WEIGHT: 209 LBS | SYSTOLIC BLOOD PRESSURE: 115 MMHG | HEART RATE: 84 BPM

## 2022-09-26 DIAGNOSIS — G89.18 ACUTE POST-OPERATIVE PAIN: ICD-10-CM

## 2022-09-26 DIAGNOSIS — J34.89 NASAL OBSTRUCTION: Primary | ICD-10-CM

## 2022-09-26 PROCEDURE — 30802 ABLATE INF TURBINATE SUBMUC: CPT | Performed by: OTOLARYNGOLOGY

## 2022-09-26 RX ORDER — OXYCODONE AND ACETAMINOPHEN 5; 325 MG/1; MG/1
1-2 TABLET ORAL EVERY 6 HOURS PRN
Qty: 8 TABLET | Refills: 0 | Status: SHIPPED | OUTPATIENT
Start: 2022-09-26 | End: 2022-09-28

## 2022-09-26 ASSESSMENT — PAIN SCALES - GENERAL: PAINLEVEL: NO PAIN (0)

## 2022-09-26 NOTE — PROGRESS NOTES
REASON FOR VISIT:   Dion is back to see us today for turbinate reduction.    PROCEDURE:  After informed consent was obtained, both sides of the nose are topically treated with lidocaine and Afrin and then both inferior turbinates are injected with 1% lidocaine with 1:100,000 epinephrine.  The left turbinate was then visualized and using the [employer] radiofrequency ablation system, numerous submucosal treatments were performed and then the turbinate was outfractured.  The identical procedure was then performed on the right side.  The patient tolerated the procedure well.

## 2022-09-26 NOTE — PATIENT INSTRUCTIONS
"You were seen in the clinic today by Dr. Garcia. If you have any questions or concerns after your appointment, please call the clinic at [phone]. Press \"1\" for scheduling, press \"3\" for nurse advice.    2.   Plan to return the clinic in 3 weeks.       Candida Burrows AKASH  [facility]  Department of Otolaryngology  [phone]       Today you had an in clinic turbinate reduction also known as radiofrequency turbinate reduction with submucosal cautery.    The following has been recommended to you based upon today's procedure:   1. No hard nose blowing for 24 hours after the procedure.   2. No strenuous activity for the 24 hours after the procedure.   3. It is normal to feel more congested for 1-2 weeks after the procedure as the procedure takes it's full effect. Most people report that the full effect of this procedure can be felt around 4 weeks post procedure.   4. Start irrigating your nose on both sides with a saline solution or sinus rinse. We recommend performing these three times a day during week 1, two times a day during week 2, and one time daily during week 3. We recommend that you use 2-3 sprays on each side of your nose. You can buy saline sprays over-the-counter at most drug stores. Some national brands include: Saline, [place] and [place]. Many pharmacies and stores carry their own brands. Many pharmacies such as [facility] also carry preservative-free saline sprays.   5. We would like you to follow up in 3 weeks in clinic with the provider to evaluate the effectiveness of this treatment.        "

## 2022-09-26 NOTE — NURSING NOTE
"Blood pressure 115/76, pulse 84, temperature 98.3  F (36.8  C), temperature source Temporal, height 1.753 m (5' 9\"), weight 94.8 kg (209 lb), SpO2 96 %.    Jolene Perales LPN    "

## 2022-09-26 NOTE — LETTER
9/26/2022        RE: Dion Thomson  [address] [facility] [address]     Dear Colleague,    Thank you for referring your patient, Dion Thomson, to the [facility]INIC [place] at [facility]. Please see a copy of my visit note below.    REASON FOR VISIT:   Dion is back to see us today for turbinate reduction.    PROCEDURE:  After informed consent was obtained, both sides of the nose are topically treated with lidocaine and Afrin and then both inferior turbinates are injected with 1% lidocaine with 1:100,000 epinephrine.  The left turbinate was then visualized and using the TaoTaoSou radiofrequency ablation system, numerous submucosal treatments were performed and then the turbinate was outfractured.  The identical procedure was then performed on the right side.  The patient tolerated the procedure well.          Again, thank you for allowing me to participate in the care of your patient.      Sincerely,    Willi Garcia MD

## 2022-10-09 DIAGNOSIS — E78.00 HYPERCHOLESTEREMIA: ICD-10-CM

## 2022-10-09 RX ORDER — ATORVASTATIN CALCIUM 10 MG/1
TABLET, FILM COATED ORAL
Qty: 30 TABLET | Refills: 11 | Status: SHIPPED | OUTPATIENT
Start: 2022-10-09 | End: 2023-10-06

## 2022-10-13 ENCOUNTER — MYC MEDICAL ADVICE (OUTPATIENT)
Dept: FAMILY MEDICINE | Facility: CLINIC | Age: 52
End: 2022-10-13

## 2022-10-13 DIAGNOSIS — Z12.11 SCREENING FOR MALIGNANT NEOPLASM OF COLON: Primary | ICD-10-CM

## 2022-10-17 ENCOUNTER — OFFICE VISIT (OUTPATIENT)
Dept: OTOLARYNGOLOGY | Facility: CLINIC | Age: 52
End: 2022-10-17
Payer: COMMERCIAL

## 2022-10-17 VITALS
HEART RATE: 74 BPM | TEMPERATURE: 98.1 F | DIASTOLIC BLOOD PRESSURE: 73 MMHG | SYSTOLIC BLOOD PRESSURE: 125 MMHG | OXYGEN SATURATION: 97 %

## 2022-10-17 DIAGNOSIS — Z98.890 POSTOPERATIVE STATE: Primary | ICD-10-CM

## 2022-10-17 PROCEDURE — 99212 OFFICE O/P EST SF 10 MIN: CPT | Performed by: OTOLARYNGOLOGY

## 2022-10-17 ASSESSMENT — PAIN SCALES - GENERAL: PAINLEVEL: NO PAIN (0)

## 2022-10-17 NOTE — NURSING NOTE
Blood pressure 125/73, pulse 74, temperature 98.1  F (36.7  C), temperature source Temporal, SpO2 97 %.    Jolene Perales LPN

## 2022-10-17 NOTE — PROGRESS NOTES
HISTORY OF PRESENT ILLNESS:  Dion is back to see us again today. He underwent turbinate reduction three weeks ago. He feels that his nose is more open. He is not having any pain.  He did have one day of bleeding, but this has resolved.     PHYSICAL EXAMINATION: On examination, he is in no acute distress.  Normal mood and affect, normal ability to communicate.  Alert and appropriate.  Head is normocephalic.  Cranial nerve VII is House-Brackmann I/VI bilaterally.  Breathing without difficulty or stridor.  Eyes are anicteric.  Skin of the head and neck appears normal.  Some crusting in both nasal passages which is removed with good nasal air flow and healthy appearing tissue.     ASSESSMENT: This is a 52-year-old status post turbinate reduction, doing appropriately.     PLAN:  Follow up in six weeks.

## 2022-10-17 NOTE — LETTER
10/17/2022       RE: Dion Thomson  [address] [facility] [address]     Dear Colleague,    Thank you for referring your patient, Dion Thomson, to the [facility] EAR NOSE AND THROAT CLINIC [place] at [facility]. Please see a copy of my visit note below.    HISTORY OF PRESENT ILLNESS:  Dion is back to see us again today. He underwent turbinate reduction three weeks ago. He feels that his nose is more open. He is not having any pain.  He did have one day of bleeding, but this has resolved.     PHYSICAL EXAMINATION: On examination, he is in no acute distress.  Normal mood and affect, normal ability to communicate.  Alert and appropriate.  Head is normocephalic.  Cranial nerve VII is House-Brackmann I/VI bilaterally.  Breathing without difficulty or stridor.  Eyes are anicteric.  Skin of the head and neck appears normal.  Some crusting in both nasal passages which is removed with good nasal air flow and healthy appearing tissue.     ASSESSMENT: This is a 52-year-old status post turbinate reduction, doing appropriately.     PLAN:  Follow up in six weeks.       Sincerely,    Willi Garcia MD

## 2022-10-25 DIAGNOSIS — J33.0 POLYP OF NASAL CAVITY: ICD-10-CM

## 2022-10-29 NOTE — TELEPHONE ENCOUNTER
DUPIXENT 300MG/2ML SOPN      Last Written Prescription Date:  4/11/22  Last Fill Quantity: 4 ml,   # refills: 6  Last Office Visit : 10/17/22 recommended 6 week follow up  Future Office visit:  11/28/22    Routing refill request to provider for review/approval because:  Drug not on ENT refill protocol

## 2022-10-31 RX ORDER — DUPILUMAB 300 MG/2ML
300 INJECTION, SOLUTION SUBCUTANEOUS
Qty: 4 ML | Refills: 6 | Status: SHIPPED | OUTPATIENT
Start: 2022-10-31 | End: 2023-08-16

## 2022-11-10 NOTE — CONFIDENTIAL NOTE
PATIENT REQUESTS COLOGUARD ORDER FOR COLON CANCER SCREENING.  COLOGUARD ORDERED TODAY.  Pastora Correia, MIKEL  November 10, 2022

## 2022-11-21 DIAGNOSIS — K21.9 GASTROESOPHAGEAL REFLUX DISEASE WITHOUT ESOPHAGITIS: ICD-10-CM

## 2022-11-28 ENCOUNTER — OFFICE VISIT (OUTPATIENT)
Dept: OTOLARYNGOLOGY | Facility: CLINIC | Age: 52
End: 2022-11-28
Payer: COMMERCIAL

## 2022-11-28 VITALS
RESPIRATION RATE: 16 BRPM | OXYGEN SATURATION: 96 % | DIASTOLIC BLOOD PRESSURE: 71 MMHG | WEIGHT: 209 LBS | TEMPERATURE: 97.9 F | SYSTOLIC BLOOD PRESSURE: 133 MMHG | HEART RATE: 74 BPM | BODY MASS INDEX: 30.86 KG/M2

## 2022-11-28 DIAGNOSIS — J34.89 NASAL OBSTRUCTION: Primary | ICD-10-CM

## 2022-11-28 PROCEDURE — 99215 OFFICE O/P EST HI 40 MIN: CPT | Performed by: OTOLARYNGOLOGY

## 2022-11-28 RX ORDER — MUPIROCIN 20 MG/G
OINTMENT TOPICAL
Qty: 30 G | Refills: 0 | Status: SHIPPED | OUTPATIENT
Start: 2022-11-28 | End: 2022-12-08

## 2022-11-28 ASSESSMENT — PAIN SCALES - GENERAL: PAINLEVEL: NO PAIN (0)

## 2022-11-28 NOTE — PROGRESS NOTES
Dion is back to see us.  He continues to have some congestion.  There is some crusting still on the right side.  At this point, plan on Bactroban for a month and then he will let me know how he is doing.  For the most part, it is better, but still not quite as good as he would like.    Fifty minutes spent on exam, chart review, and documentation on the date of the visit.

## 2022-12-06 LAB — NONINV COLON CA DNA+OCC BLD SCRN STL QL: NORMAL

## 2022-12-25 ENCOUNTER — HEALTH MAINTENANCE LETTER (OUTPATIENT)
Age: 52
End: 2022-12-25

## 2023-04-01 DIAGNOSIS — K21.9 GASTROESOPHAGEAL REFLUX DISEASE WITHOUT ESOPHAGITIS: ICD-10-CM

## 2023-04-01 DIAGNOSIS — J45.20 MILD INTERMITTENT ASTHMA WITHOUT COMPLICATION: ICD-10-CM

## 2023-04-02 RX ORDER — ALBUTEROL SULFATE 90 UG/1
AEROSOL, METERED RESPIRATORY (INHALATION)
Qty: 18 G | Refills: 11 | Status: SHIPPED | OUTPATIENT
Start: 2023-04-02 | End: 2024-05-23

## 2023-04-06 DIAGNOSIS — J45.40 MODERATE PERSISTENT REACTIVE AIRWAY DISEASE WITHOUT COMPLICATION: ICD-10-CM

## 2023-04-07 DIAGNOSIS — J45.20 MILD INTERMITTENT ASTHMA WITHOUT COMPLICATION: ICD-10-CM

## 2023-04-08 RX ORDER — FLUTICASONE PROPIONATE AND SALMETEROL 113; 14 UG/1; UG/1
POWDER, METERED RESPIRATORY (INHALATION)
Qty: 1 EACH | Refills: 11 | Status: SHIPPED | OUTPATIENT
Start: 2023-04-08 | End: 2024-04-30

## 2023-04-11 RX ORDER — BUDESONIDE 0.25 MG/2ML
INHALANT ORAL
Qty: 240 ML | Refills: 11 | Status: SHIPPED | OUTPATIENT
Start: 2023-04-11

## 2023-04-11 NOTE — TELEPHONE ENCOUNTER
BUDESONIDE 0.25 MG/2 ML SUSP  Last Written Prescription Date:   2/7/2022  Last Fill Quantity: 240,   # refills: 11  Last Office Visit :  11/28/2022  Future Office visit:  None    Routing refill request to provider for review/approval because:  Needing an updated ACT score on file for this med per Protocol for refills.  Please review and refill per Providers orders.       Shalini Abad RN  Central Triage Red Flags/Med Refills

## 2023-05-24 ENCOUNTER — MYC MEDICAL ADVICE (OUTPATIENT)
Dept: OTOLARYNGOLOGY | Facility: CLINIC | Age: 53
End: 2023-05-24
Payer: COMMERCIAL

## 2023-05-27 ENCOUNTER — OFFICE VISIT (OUTPATIENT)
Dept: FAMILY MEDICINE | Facility: CLINIC | Age: 53
End: 2023-05-27
Payer: COMMERCIAL

## 2023-05-27 ENCOUNTER — HOSPITAL ENCOUNTER (OUTPATIENT)
Dept: GENERAL RADIOLOGY | Facility: HOSPITAL | Age: 53
Discharge: HOME | End: 2023-05-27
Attending: PHYSICIAN ASSISTANT | Admitting: PHYSICIAN ASSISTANT
Payer: COMMERCIAL

## 2023-05-27 VITALS
HEART RATE: 80 BPM | TEMPERATURE: 98.2 F | DIASTOLIC BLOOD PRESSURE: 71 MMHG | SYSTOLIC BLOOD PRESSURE: 125 MMHG | OXYGEN SATURATION: 96 %

## 2023-05-27 DIAGNOSIS — R09.81 SINUS CONGESTION: Primary | ICD-10-CM

## 2023-05-27 DIAGNOSIS — J01.90 ACUTE SINUSITIS WITH SYMPTOMS > 10 DAYS: ICD-10-CM

## 2023-05-27 DIAGNOSIS — R06.2 WHEEZING: ICD-10-CM

## 2023-05-27 DIAGNOSIS — J45.20 MILD INTERMITTENT ASTHMA WITHOUT COMPLICATION: ICD-10-CM

## 2023-05-27 PROCEDURE — 99214 OFFICE O/P EST MOD 30 MIN: CPT | Performed by: PHYSICIAN ASSISTANT

## 2023-05-27 PROCEDURE — 70220 X-RAY EXAM OF SINUSES: CPT

## 2023-05-27 RX ORDER — CEFUROXIME AXETIL 500 MG/1
500 TABLET ORAL 2 TIMES DAILY
Qty: 20 TABLET | Refills: 0 | Status: SHIPPED | OUTPATIENT
Start: 2023-05-27 | End: 2023-06-06

## 2023-05-27 RX ORDER — PREDNISONE 20 MG/1
TABLET ORAL
Qty: 20 TABLET | Refills: 0 | Status: SHIPPED | OUTPATIENT
Start: 2023-05-27 | End: 2023-06-19

## 2023-05-27 NOTE — PATIENT INSTRUCTIONS
(R09.81) Sinus congestion  (primary encounter diagnosis)  Comment:   Plan: XR Sinus Complete G/E 3 Views, predniSONE         (DELTASONE) 20 MG tablet            (J45.20) Mild intermittent asthma without complication  Comment: and seasonal allergies  Plan: predniSONE (DELTASONE) 20 MG tablet            (R06.2) Wheezing  Comment:   Plan: predniSONE (DELTASONE) 20 MG tablet            (J01.90) Acute sinusitis with symptoms > 10 days  Comment:   Plan: cefuroxime (CEFTIN) 500 MG tablet            Steam treatments    Restart the budesinide rinses and stay on until your follow up with ENT in July

## 2023-05-27 NOTE — PROGRESS NOTES
Patient presents with:  Sinus Problem: Chronic sinus congestion increased symptoms for the past 2 weeks has appt with ENT in July    (R09.81) Sinus congestion  (primary encounter diagnosis)  Comment:   Plan: XR Sinus Complete G/E 3 Views, predniSONE         (DELTASONE) 20 MG tablet            (J45.20) Mild intermittent asthma without complication  Comment: and seasonal allergies  Plan: predniSONE (DELTASONE) 20 MG tablet  Continue albuterol inhaler as needed.          (R06.2) Wheezing  Comment:   Plan: predniSONE (DELTASONE) 20 MG tablet            (J01.90) Acute sinusitis with symptoms > 10 days  Comment:   Plan: cefuroxime (CEFTIN) 500 MG tablet            Steam treatments    Restart the budesinide rinses and stay on until your follow up with ENT 7/17/23, sooner as needed.          SUBJECTIVE:   Dion Thomson is a 52 year old male who presents today with persistent sinus congestion for the past 2 weeks.  Worsening.  He has a long history of chronic sinusitis.    Last prednisone and antibiotics was in April via a virtual visit: Augmentin and Prednisone burst   Has allergies and asthma.      Takes allergra daily, now taking twice a day.    Tried flonase and nasonex in the past, then started doing a budesonide and sodium chloride rinse in the past, but stopped it about 6 months ago.  Has tried it a few times but without relief.  Feels too congested for the medication to get into his nose.        He also complains of some intermittent wheezing in past week, he does have asthma.  Using his albuterol inhaler as needed.      Past Medical History:   Diagnosis Date     Allergic rhinitis      Anxiety      Chronic sinusitis      Chronic sinusitis      Gastroesophageal reflux disease      Hypercholesteremia      Intermittent asthma 12/20/2013     Obstructive sleep apnea      URBANO (obstructive sleep apnea) 12/19/2014         Current Outpatient Medications   Medication Sig Dispense Refill     Multiple Vitamins-Iron  (DAILY-CLAUDIA/IRON/BETA-CAROTENE) TABS TAKE 1 TABLET BY MOUTH DAILY. (Patient not taking: Reported on 10/19/2020) 30 tablet 7     Social History     Tobacco Use     Smoking status: Never Smoker     Smokeless tobacco: Never Used   Substance Use Topics     Alcohol use: Not on file     Family History   Problem Relation Age of Onset     Diabetes Mother      Diabetes Father          ROS:    10 point ROS of systems including Constitutional, Eyes, Respiratory, Cardiovascular, Gastroenterology, Genitourinary, Integumentary, Muscularskeletal, Psychiatric ,neurological were all negative except for pertinent positives noted in my HPI       OBJECTIVE:  /71   Pulse 80   Temp 98.2  F (36.8  C)   SpO2 96%   Physical Exam:  GENERAL APPEARANCE: healthy, alert and no distress  EYES: EOMI,  PERRL, conjunctiva clear  HENT: ear canals and TM's normal.  Nose and mouth without ulcers, erythema or lesions  HENT: nasal turbinates boggy with bluish hue, nearly occluding nares on right and rhinorrhea yellow  NECK: supple, nontender, no lymphadenopathy  RESP: A few scattered wheezes  CV: regular rates and rhythm, normal S1 S2, no murmur noted  NEURO: Normal strength and tone, sensory exam grossly normal,  normal speech and mentation  SKIN: no suspicious lesions or rashes    X-Ray was done, my findings are: mucosal thickening in maxillary sinuses.

## 2023-05-31 LAB — NONINV COLON CA DNA+OCC BLD SCRN STL QL: NEGATIVE

## 2023-06-01 NOTE — RESULT ENCOUNTER NOTE
Dear Dion,     I am writing to report that your included test results are as expected.    Many labs contain some results that are slightly outside of the normal range, I have reviewed any of these results and they require no changes at this time.    Viral Rivera MD

## 2023-06-19 ENCOUNTER — OFFICE VISIT (OUTPATIENT)
Dept: OTOLARYNGOLOGY | Facility: CLINIC | Age: 53
End: 2023-06-19
Payer: COMMERCIAL

## 2023-06-19 ENCOUNTER — MYC MEDICAL ADVICE (OUTPATIENT)
Dept: OTOLARYNGOLOGY | Facility: CLINIC | Age: 53
End: 2023-06-19

## 2023-06-19 VITALS
SYSTOLIC BLOOD PRESSURE: 118 MMHG | WEIGHT: 217 LBS | OXYGEN SATURATION: 98 % | BODY MASS INDEX: 32.14 KG/M2 | HEART RATE: 77 BPM | HEIGHT: 69 IN | DIASTOLIC BLOOD PRESSURE: 74 MMHG

## 2023-06-19 DIAGNOSIS — J45.20 MILD INTERMITTENT ASTHMA WITHOUT COMPLICATION: ICD-10-CM

## 2023-06-19 DIAGNOSIS — J33.9 NASAL POLYP: Primary | ICD-10-CM

## 2023-06-19 DIAGNOSIS — R09.81 SINUS CONGESTION: ICD-10-CM

## 2023-06-19 DIAGNOSIS — R06.2 WHEEZING: ICD-10-CM

## 2023-06-19 DIAGNOSIS — R09.81 SINUS CONGESTION: Primary | ICD-10-CM

## 2023-06-19 PROCEDURE — 31231 NASAL ENDOSCOPY DX: CPT | Performed by: OTOLARYNGOLOGY

## 2023-06-19 PROCEDURE — 99213 OFFICE O/P EST LOW 20 MIN: CPT | Mod: 25 | Performed by: OTOLARYNGOLOGY

## 2023-06-19 RX ORDER — PREDNISONE 20 MG/1
TABLET ORAL
Qty: 20 TABLET | Refills: 0 | Status: SHIPPED | OUTPATIENT
Start: 2023-06-19

## 2023-06-19 ASSESSMENT — PAIN SCALES - GENERAL: PAINLEVEL: NO PAIN (0)

## 2023-06-19 NOTE — LETTER
6/19/2023       RE: Dion Thomson  [address] [place] [address]     Dear Colleague,    Thank you for referring your patient, Dion Thomson, to the [facility] EAR NOSE AND THROAT CLINIC [place] at [facility]. Please see a copy of my visit note below.    HISTORY OF PRESENT ILLNESS:  Dion is back to see us today.  He has been feeling congested recently.  He has been doing his budesonide irrigations.  He has tried Dupixent in the past without good value.  He feels that he has had infection and his sense of smell is diminished.    PHYSICAL EXAMINATION:  This is a 52-year-old in no acute distress.  Normal mood and affect, normal ability to communicate.  Alert and appropriate.  Head is normocephalic.  Cranial nerves is House-Brackmann I/VI bilaterally.  Breathing without difficulty or stridor.  Eyes are anicteric.  Skin of the head and neck appears normal.    PROCEDURE:  Examination of the nose was performed.  First, the nose was sprayed with lidocaine and Afrin.  Nasal endoscopy was performed.  The patient has some mild to moderate nasal polyposis bilaterally with some associated purulence.    ASSESSMENT:  A 52-year-old with recurrent nasal polyposis.    PLAN:  At this point, we will do a course of prednisone and Augmentin and switch him to Rhinocort Aqua and we will get him evaluated for possible allergy shots.  We will get a CT and see him back to discuss next options.      Again, thank you for allowing me to participate in the care of your patient.      Sincerely,    Willi Garcia MD

## 2023-06-19 NOTE — PATIENT INSTRUCTIONS
You were seen in the ENT Clinic today by Dr. Garcia. If you have any questions or concerns after your appointment, please contact us (see below)     2.   Please return to the clinic in 6 weeks.              How to Contact Us:  Send a Missionly message to your provider. Our team will respond to you via Missionly. Occasionally, we will need to call you to get further information.  For urgent matters (Monday-Friday), call the ENT Clinic: [phone] and speak with a call center team member - they will route your call appropriately.   If you'd like to speak directly with a nurse, please find our contact information below. We do our best to check voicemail frequently throughout the day, and will work to call you back within 1-2 days. For urgent matters, please use the general clinic phone numbers listed above.        Lynne MARTÍNEZ RN  ENT RN Care Coordinator  Direct: [phone]  Candida TREADWELL LPN  Direct: [phone]           [facility]  Department of Otolaryngology

## 2023-06-22 NOTE — TELEPHONE ENCOUNTER
FUTURE VISIT INFORMATION      FUTURE VISIT INFORMATION:    Date: 8.9.23    Time: 7:30    [place] [facility]  [place]ERRAL INFORMATION:    Referring provider:  Jose    Referring providers clinic:  ENT    Reason for visit/diagnosis  Sinus congestion [R09.81],Wheezing [R06.2], recs in Epic, Ref by Willi Garcia MD, Per Pt    RECORDS REQUESTED FROM:       Clinic name Comments Records Status Imaging Status   ENT 6.19.23, 11.28.22, 9.26.22 + more with  Jose Velez    FP 5.27.23  [facility]

## 2023-06-26 NOTE — PROGRESS NOTES
HISTORY OF PRESENT ILLNESS:  Dion is back to see us today.  He has been feeling congested recently.  He has been doing his budesonide irrigations.  He has tried Dupixent in the past without good value.  He feels that he has had infection and his sense of smell is diminished.    PHYSICAL EXAMINATION:  This is a 52-year-old in no acute distress.  Normal mood and affect, normal ability to communicate.  Alert and appropriate.  Head is normocephalic.  Cranial nerves is House-Brackmann I/VI bilaterally.  Breathing without difficulty or stridor.  Eyes are anicteric.  Skin of the head and neck appears normal.    PROCEDURE:  Examination of the nose was performed.  First, the nose was sprayed with lidocaine and Afrin.  Nasal endoscopy was performed.  The patient has some mild to moderate nasal polyposis bilaterally with some associated purulence.    ASSESSMENT:  A 52-year-old with recurrent nasal polyposis.    PLAN:  At this point, we will do a course of prednisone and Augmentin and switch him to Rhinocort Aqua and we will get him evaluated for possible allergy shots.  We will get a CT and see him back to discuss next options.

## 2023-08-07 ENCOUNTER — ANCILLARY PROCEDURE (OUTPATIENT)
Dept: CT IMAGING | Facility: CLINIC | Age: 53
End: 2023-08-07
Attending: OTOLARYNGOLOGY
Payer: COMMERCIAL

## 2023-08-07 ENCOUNTER — PREP FOR PROCEDURE (OUTPATIENT)
Dept: OTOLARYNGOLOGY | Facility: CLINIC | Age: 53
End: 2023-08-07

## 2023-08-07 ENCOUNTER — OFFICE VISIT (OUTPATIENT)
Dept: OTOLARYNGOLOGY | Facility: CLINIC | Age: 53
End: 2023-08-07
Payer: COMMERCIAL

## 2023-08-07 VITALS
SYSTOLIC BLOOD PRESSURE: 115 MMHG | WEIGHT: 217 LBS | DIASTOLIC BLOOD PRESSURE: 71 MMHG | BODY MASS INDEX: 32.14 KG/M2 | OXYGEN SATURATION: 97 % | HEART RATE: 74 BPM | TEMPERATURE: 97.7 F | HEIGHT: 69 IN

## 2023-08-07 DIAGNOSIS — J32.1 CHRONIC FRONTAL SINUSITIS: Primary | ICD-10-CM

## 2023-08-07 DIAGNOSIS — R09.81 SINUS CONGESTION: Primary | ICD-10-CM

## 2023-08-07 DIAGNOSIS — J33.9 NASAL POLYP: ICD-10-CM

## 2023-08-07 PROCEDURE — 99213 OFFICE O/P EST LOW 20 MIN: CPT | Performed by: OTOLARYNGOLOGY

## 2023-08-07 PROCEDURE — 70486 CT MAXILLOFACIAL W/O DYE: CPT | Mod: GC | Performed by: RADIOLOGY

## 2023-08-07 RX ORDER — DEXAMETHASONE SODIUM PHOSPHATE 4 MG/ML
10 INJECTION, SOLUTION INTRA-ARTICULAR; INTRALESIONAL; INTRAMUSCULAR; INTRAVENOUS; SOFT TISSUE ONCE
Status: CANCELLED | OUTPATIENT
Start: 2023-08-07 | End: 2023-08-07

## 2023-08-07 ASSESSMENT — PAIN SCALES - GENERAL: PAINLEVEL: NO PAIN (0)

## 2023-08-07 NOTE — PATIENT INSTRUCTIONS
You were seen in the ENT Clinic today by Dr. Garcia. If you have any questions or concerns after your appointment, please contact us (see below)    Sinus surgery    Sinus surgery is often done under general anesthesia. This means that you will go to sleep while the surgery is being performed. In most cases, this is an outpatient surgery, which means that you will go home the same day as surgery.  If you take any medications that thin the blood such as prescription blood thinners, or over the counter medications such as aspirin, on non steroidal anti-inflammatory drugs such as naproxen or ibuprofen, you will be asked to stop these prior to surgery. If you take over the counter supplements such as vitamin E, garlic, kat, gingko, or ginseng, you may be asked to stop these before surgery as well.  Some people may have pain after sinus surgery. Depending on the extent of your surgery, you may be prescribed a stronger pain medication.   There may have packing in your nose after surgery. This packing will cause you to be congested and make it difficult to breathe through your nose until you seen your surgeon after surgery.  Generally, pain, congestion, and drainage should improve after the first few days. Mild symptoms may linger for several weeks after your surgery. Your surgeon may also recommend other medications after surgery such as steroids or antibiotics, and may have you start saline rinses. Please refer to your after visit summary after discharge from surgery to review your surgeon's recommendations.  Some patients may notice improvement immediately after surgery, and others may take a few weeks before they feel a lot better. This is all dependent on the extent of surgery and an individual's own healing process.  You will follow up with your surgeon in clinic a few weeks after surgery where the will look in your nose and remove any crusts that do not flush out with saline irrigations an pull out any of the  non-absorbable packing.  It is recommended for you to take some time off after surgery. The timeline for your return to work may vary, but most patients need 1-2 weeks of recovery time after surgery. Your return to work may also be based on the type of work you perform for your job    Expectations    Packing: If you have packing in your nose. Some packing is absorbable and some may be non-absorbable. It may be recommended for you to do sinus irrigations to dissolve the packing and remove it from your nose.  Irrigations: Regular irrigations of your nose and sinuses will help your recovery after surgery. It is usually recommended for you to start the irrigations 1-2 days after surgery. They will let you know the recommendations in your after visit summary at your discharge after surgery.  Antibiotics: Infection can be a common problem in patients with chronic sinusitis. Your surgeon may decide to prescribe antibiotics after surgery. This will be determined on a case by case basis. Not everyone will need antibiotics after surgery.  Steroids: Inflammation and swelling can be caused by chronic sinusitis. Your surgeon may prescribe oral or topical steroids depending on your symptoms. Not everyone will need steroids after surgery.  Pain: The amount of pain after surgery can vary person to person. You may receive a prescription pain medication or may do over the counter pain medications such as Tylenol to help control the discomfort. Your surgeon will decide what is best in your case.  Bleeding: It is common after surgery to see some drops of blood or have blood-tinged nasal secretions. Keeping your head elevated and avoiding nose blowing can help prevent bleeding. If you feel a cough or sneeze coming, you should open your mouth to let it out. Avoid lifting heavy objects and doing strenuous activities or exercise for the first couple weeks after surgery. You should also avoid medications that may thin your blood for a few  weeks after surgery. In some cases you may be given a medication called Afrin to help control small amounts of bleeding. Please follow the directions for use that you are given after surgery. If you have heavy bleeding out of your nose that does not stop after holding pressure on your nose for several minutes, or you are concerned you may be bleeding too much, seek medical attention or go to the emergency department for further evaluation.  Nasal congestion/obstruction: It may take a little while after surgery to notice improvement after surgery. This may be related to many different factors such as packing, crusting or normal post-surgical swelling. Be patient, irrigate regularly and be sure to attend your post operative appointment with your surgeon.  Fatigue: It is common to feel tired after surgery. This may last a few days or even a few weeks after surgery. It is important to take it easy while recovery from surgery.     How to Contact Us:  Send a threadsy message to your provider. Our team will respond to you via threadsy. Occasionally, we will need to call you to get further information.  For urgent matters (Monday-Friday), call the ENT Clinic: [phone] and speak with a call center team member - they will route your call appropriately.   If you'd like to speak directly with a nurse, please find our contact information below. We do our best to check voicemail frequently throughout the day, and will work to call you back within 1-2 days. For urgent matters, please use the general clinic phone numbers listed above.      Lynne MARTÍNEZ RN  ENT RN Care Coordinator  Direct: [phone]  Candida TREADWELL LPN  Direct: [phone]

## 2023-08-07 NOTE — PROGRESS NOTES
Patient is seen in clinic to review CT scan.  Demonstrates continued frontal sinus obstruction.  Has failed medical management.  We discussed options and plan will be for revision frontal sinusotomy bilaterally.  20 minutes spent with the patient and on chart review on the date of the visit.    Willi Garcia MD

## 2023-08-07 NOTE — LETTER
8/7/2023       RE: Dion Thomson  [address]  [place] [address]     Dear Colleague,    Thank you for referring your patient, Dion Thomson, to the [facility] EAR NOSE AND THROAT CLINIC [place] at [facility]. Please see a copy of my visit note below.    Patient is seen in clinic to review CT scan.  Demonstrates continued frontal sinus obstruction.  Has failed medical management.  We discussed options and plan will be for revision frontal sinusotomy bilaterally.  20 minutes spent with the patient and on chart review on the date of the visit.    Willi Garcia MD

## 2023-08-08 ENCOUNTER — TELEPHONE (OUTPATIENT)
Dept: OTOLARYNGOLOGY | Facility: CLINIC | Age: 53
End: 2023-08-08
Payer: COMMERCIAL

## 2023-08-08 PROBLEM — J32.1 CHRONIC FRONTAL SINUSITIS: Status: ACTIVE | Noted: 2023-08-07

## 2023-08-08 NOTE — TELEPHONE ENCOUNTER
Patient called back & scheduled surgery with Dr. Garcia on 12/18    Surgery is located at [facility]    Patient will be seen for their H&P by:    PCP Viral Rivera MD within 30 days of surgery    Patient confirmed their PCP on file is up to date    Anesthesia type: General      Requested Imaging required for surgery: NA    Patient needs scheduled for their 1 week post op     Patient will receive their packet via [employer] per their preference    Additional comments: GIA Jimenez on 8/8/2023 at 10:36 AM

## 2023-08-08 NOTE — PROGRESS NOTES
[facility] Dermato-allergology Note  Office visit  Encounter Date: Aug 9, 2023  ____________________________________________    CC: No chief complaint on file.      HPI:  (Aug 9, 2023)  Mr. Dion Thomson is a(n) 52 year old male who presents today as new patient for allergy consultation  - about 1.5 years ago patient lost the sense of smell with strong Rhinosinusitis and gets regularly Sinus infections. Patient had since childhood problems with stuffy nose and Conjunctivitis, but Sinusitis really started about 5 years ago  - was for about 1 year on Dupixent without much effect  - now on Budesonide washes and saline (few year)  - otherwise feeling well in usual state of health    Physical exam:  General: In no acute distress, well-developed, well-nourished  Eyes: no conjunctivitis  ENT: no signs of rhinitis   Pulmonary: no wheezing or coughing  Skin: Focused examination of the skin on test sites was performed = see test results below    Past Medical History:   Patient Active Problem List   Diagnosis     Esophageal reflux     Intermittent asthma     Obesity     URBANO (obstructive sleep apnea)     Hypercholesteremia     Encounter for long-term (current) use of medications     ACL tear     Past Medical History:   Diagnosis Date     Allergic rhinitis      Anxiety      Chronic sinusitis      Chronic sinusitis      Gastroesophageal reflux disease      Hypercholesteremia      Intermittent asthma 12/20/2013     Obstructive sleep apnea      URBANO (obstructive sleep apnea) 12/19/2014       Allergies:  Allergies   Allergen Reactions     Other [No Clinical Screening - See Comments] Hives     Pain medication - unknown - from 1st knee surgery       Medications:  Current Outpatient Medications   Medication     acetaminophen (TYLENOL) 500 MG tablet     albuterol (PROAIR HFA/PROVENTIL HFA/VENTOLIN HFA) 108 (90 Base) MCG/ACT inhaler     albuterol (PROVENTIL) (2.5 MG/3ML) 0.083% neb solution     atorvastatin (LIPITOR) 10  MG tablet     budesonide (PULMICORT) 0.25 MG/2ML neb solution     budesonide (RINOCORT AQUA) 32 MCG/ACT nasal spray     Cholecalciferol (VITAMIN D) 2000 UNITS tablet     dupilumab (DUPIXENT) 300 MG/2ML prefilled pen     fexofenadine (ALLEGRA) 180 MG tablet     fluticasone (FLONASE) 50 MCG/ACT nasal spray     fluticasone-salmeterol (AIRDUO RESPICLICK) 113-14 MCG/ACT inhaler     naproxen sodium 220 MG capsule     omeprazole (PRILOSEC) 20 MG DR capsule     predniSONE (DELTASONE) 20 MG tablet     sodium chloride 0.9 % neb solution     SYMBICORT 160-4.5 MCG/ACT Inhaler     vitamin B complex with vitamin C (STRESS TAB) tablet     No current facility-administered medications for this visit.       Social History:  The patient works as a emergency department. Patient has the following hobbies or non-occupational exposure: soccer    Family History:  Family History   Problem Relation Age of Onset     Diabetes Mother      Bipolar Disorder Mother      Anesthesia Reaction Mother         high tolerance     Coronary Artery Disease Father      Myocardial Infarction Father      Gout Brother      Hyperlipidemia Brother      Diabetes Maternal Grandmother      Myocardial Infarction Maternal Grandfather      Coronary Artery Disease Early Onset Maternal Grandfather      Unknown/Adopted Paternal Grandmother      Liver Cancer Paternal Grandfather      No Known Problems Daughter      No Known Problems Daughter      Diabetes Maternal Aunt      Mental Illness Maternal Aunt      Cancer Other         multiple, both sides     Heart Disease Other    in Hx children have immediate type reactions and wife and children have POTS and Mast cell activation syndrome    Previous Labs, Allergy Tests, Dermatopathology, Imaging:  Had Sinus CT recently and mostly frontal Sinus blocked    Referred By: Willi Garcia MD  [address] [place] [id number]     Allergy Tests:    Past Allergy Test    Order for Future Allergy Testing:    [x] Outpatient  []  Inpatient: [facility]..../ Bed ....       Skin Atopy (atopic dermatitis) [] Yes   [x] No .........  Contact allergies:   [] Yes   [x] No ..........  Hand eczema:   [] Yes   [x] No           Leading hand:   [] R   [] L       [] Ambidextrous         Drug allergies:        [] Yes   [x] No  which?......    Urticaria/Angioedema  [x] Yes   [] No .contact urticaria with cat and weeds.  Food Allergy:  [] Yes   [x] No  which?......  Pets :  [x] Yes   [] No  Which?..2 dogs, 2 cats, 1 rabbit, 6 guinea pigs --> some contact urticaria to cat scratch         [x]  Rhinitis   [x] Conjunctivitis   [x] Sinusitis   [] Polyposis   [] Otitis   [] Pharyngitis         [x]  Postnasal drip    []  none  Operations:   [] Tonsils   [] Septum   [] Sinus   [] Polyposis        [x] Asthma bronchiale   [] Coughing      []  none last years uses Albuterol   Symptoms (mostly Rhinoconjunctivitis and Asthma) aggravated by:  Season   [] I   [] II   [] III   [] IV   []V   []VI   []VII   []VIII   []IX   []X   []XI   []XII     [x] perennial   Day time      [] morning   [] noon      [] evening        [] night    [x] whole day........  []  none  Location/changes    [] inside        [] outside   [] mountains    [] sea     [] others.............   [x]  none  Triggers, specific     [] animals     [] plants     [] dust              [] others ...........................    [x]  none  Triggers, others       [] work          [] psyche    [] sport            [] others .............................  [x]  none  Irritant                [] phys efforts [] smoke    [] heat/cold     [] odors  []others............... [x]  none    Order for PATCH TESTS  Reason for tests (suspected allergy): not necessary  Known previous allergies: none  Standardized panels  [] Standard panel (40 tests)  [] Preservatives & Antimicrobials (31 tests)  [] Emulsifiers & Additives (25 tests)   [] Perfumes/Flavours & Plants (25 tests)  [] Hairdresser panel (12 tests)  [] Rubber Chemicals (22 tests)  []  Plastics (26 tests)  [] Colorants/Dyes/Food additives (20 tests)  [] Metals (implants/dental) (24 tests)  [] Local anaesthetics/NSAIDs (13 tests)  [] Antibiotics & Antimycotics (14 tests)   [] Corticosteroids (15 tests)   [] Photopatch test (62 tests)   [] others: ...      [] Patient's own products: ...    DO NOT test if chemical or biological identity is unknown!     always ask from patient the product information and safety sheets (MSDS)       Order for PRICK TESTS    Reason for tests (suspected allergy):   Known previous allergies: had as teenager prick tests and was positive to many allergens, such as pets, molds, dust mites, grasses    Standardized prick panels  [] Atopic panel (20 tests)  [] Pediatric Panel (12 tests)  [] Milk, Meat, Eggs, Grains (20 tests)   [] Dust, Epithelia, Feathers (10 tests)  [] Fish, Seafood, Shellfish (17 tests)  [] Nuts, Beans (14 tests)  [] Spice, Vegetable, Fruit (17 tests)  [] Pollen Panel = Tree, Grass, Weed (24 tests)  [] Others: ...      [] Patient's own products: ...    DO NOT test if chemical or biological identity is unknown!     always ask from patient the product information and safety sheets (MSDS)     Standardized intradermal tests  [] Penicillium notatum [] Aspergillus fumigatus [] House dust mites D.far & D. pteron  [] Cat    [] dog  [] Others: ...  [] Bee venom   [] Wasp venom  !!Specific protocol with dilutions!!       Order for Drug allergy tests (prick & Intradermal &  patch tests)    [] Penicillin G  [] Ampicillin [] Cefazolin   [] Ceftriaxone   [] Ceftazidime  [] Bactrim    [] Others: ...  Order for ... as test date    Atopy Screen (Placed Aug 9, 2023)  No Substance Readings (15 min) Evaluation   POS Histamine 1mg/ml ++    NEG NaCl 0.9% - dermographism     No Substance Readings (15 min) Evaluation   1 Alternaria alternata (tenuis)  -    2 Cladosporium herbarum +    3 Aspergillus fumigatus -    4 Penicillium notatum -    5 Dermatophagoides pteronyssinus +++    6  Dermatophagoides farinae +++    7 Dog epithelium (canis spp) -    8 Cat hair (jelani catus) +++    9 Cockroach   (Blatella americana & germanica) +    10 Grass mix midwest   (Kath, Orchard, Redtop, Spike) ++    11 Nav grass (sorghum halepense) -    12 Weed mix   (common Cocklebur, Lamb s quarters, rough redroot Pigweed, Dock/Sorrel) -    13 Mug wort (artemisia vulgare) -    14 Ragweed giant/short (ambrosia spp) +/++    15 White birch (Betula papyrifera) +++    16 Tree mix 1 (Pecan, Maple BHR, Oak RVW, american [place], black [place]) ++    17 Red cedar (juniperus virgin[place]) -    18 Tree mix 2   (white [place], river/red Birch, black Whiting, common [place], american Elm) ++    19 Box elder/Maple mix (acer spp) -    20 Whiteside shagbark (carya ovata) +/++           Conclusion    Dust, Epithelia, Feathers (Placed Aug 9, 2023)    No Substance Readings (15min) Evaluation   1 Guinea pig (+)    5 Rabbit epithelium (+)    Conclusions:       ________________________________    Assessment & Plan:    ==> Final Diagnosis:     # atopic predisposition with    Perennial RC, Rhinosinusitis, PND and sometimes Asthma = strong and relevant sensitization to house dust mites    Clinically not very relevant sensitization to tree>grass>ragweed pollens    Sensitization to cat allergen, much less to dog, guinea pig and rabbit    Positive family hx  * chronic illness with exacerbation, progression, side effects from treatment    # recurrent severe Rhinosinusitis = allergic component ==> HOUSE DUST MITES?  * chronic illness with exacerbation, progression, side effects from treatment      These conclusions are made at the best of one's knowledge and belief based on the provided evidence such as patient's history and allergy test results and they can change over time or can be incomplete because of missing information's.    ==> Treatment Plan:  >> info given HOUSE DUST MITES reduction  >> info given for oral IT for HOUSE DUST MITES (Odactra)  and maybe also consider IT with [place] (Clustek Max)  >> try at bedtime Allegra 180mg and Montelukast 10mg    Procedures Performed: Allergy tests, including prick tests    Staff:  Provider    Follow-up in Derm-Allergy clinic in few months to discuss further treatement    I spent a total of 38 minutes with Dion Thomson. This time was spent counseling the patient and/or coordinating care, explaining the allergy tests, performing allergy tests and assessing the clinical relevance.

## 2023-08-08 NOTE — TELEPHONE ENCOUNTER
Left patient a voicemail to schedule STEALTH GUIDED BILATERAL FRONTAL SINUSOTOMY (Bilateral)  with Dr. Jose Jimenez on 8/8/2023 at 9:03 AM

## 2023-08-09 ENCOUNTER — PRE VISIT (OUTPATIENT)
Dept: ALLERGY | Facility: CLINIC | Age: 53
End: 2023-08-09

## 2023-08-09 ENCOUNTER — OFFICE VISIT (OUTPATIENT)
Dept: ALLERGY | Facility: CLINIC | Age: 53
End: 2023-08-09
Payer: COMMERCIAL

## 2023-08-09 DIAGNOSIS — Z91.09 HOUSE DUST MITE ALLERGY: Primary | ICD-10-CM

## 2023-08-09 DIAGNOSIS — R06.2 WHEEZING: ICD-10-CM

## 2023-08-09 DIAGNOSIS — R09.81 SINUS CONGESTION: ICD-10-CM

## 2023-08-09 DIAGNOSIS — J30.1 NON-SEASONAL ALLERGIC RHINITIS DUE TO POLLEN: ICD-10-CM

## 2023-08-09 DIAGNOSIS — J45.21 MILD INTERMITTENT ASTHMA WITH ACUTE EXACERBATION: ICD-10-CM

## 2023-08-09 PROCEDURE — 99204 OFFICE O/P NEW MOD 45 MIN: CPT | Mod: 25 | Performed by: DERMATOLOGY

## 2023-08-09 PROCEDURE — 95004 PERQ TESTS W/ALRGNC XTRCS: CPT | Performed by: DERMATOLOGY

## 2023-08-09 RX ORDER — FEXOFENADINE HCL 180 MG/1
180 TABLET ORAL AT BEDTIME
Qty: 30 TABLET | Refills: 3 | Status: SHIPPED | OUTPATIENT
Start: 2023-08-09 | End: 2023-12-19

## 2023-08-09 RX ORDER — MONTELUKAST SODIUM 10 MG/1
10 TABLET ORAL AT BEDTIME
Qty: 30 TABLET | Refills: 3 | Status: SHIPPED | OUTPATIENT
Start: 2023-08-09 | End: 2023-12-16

## 2023-08-09 NOTE — PATIENT INSTRUCTIONS
House Dust Mite Allergy        The house dust mite is an arachnid about 0.3 mm in size and not visible to the naked eye. There are around 150 species of house dust mites in the world. One mite produces up to 40 fecal droppings a day. One teaspoonful of bedroom dust contains an average of nearly 1000 mites and 250,000 minute droppings.    Causes and triggers of house dust mite allergy  The house dust mite requires a warm, moist environment without light in order to live and reproduce. Our beds are ideal. The mite feeds on human and animal skin scales. The allergen is mainly contained in the mite's feces. The feces contain allergy-triggering constituents which are spread in fine dust, are breathed in and can cause an allergic reaction.    Symptoms  When the allergens come into contact with the mucous membranes in the eyes, nose, mouth and throat, sufferers develop symptoms typical of an allergic cold (allergic rhinitis) or an allergic inflammation of the conjunctiva (allergic conjunctivitis): blocked or runny nose, sneezing, red, itchy eyes. If all of these symptoms are present, then the condition is also known as rhinoconjunctivitis. Often, the upper respiratory tract becomes chronically inflamed, primarily because house dust mites are present all year round.  The symptoms of house dust mite allergy typically occur in the morning and are more frequent in the cold months of the year.    Therapy and treatment  As a first step, mattress, pillows and duvet/comforter should be placed in mite-proof or anti-mite covers, sometimes known as encasings. Alternatively you can use pillows or comforter that can be washed at over 130 F monthly. At the same time, house dust should be minimized. If necessary, the symptoms can be treated with medication, for example antihistamines in the form of nasal sprays, eye drops and tablets. Desensitization/specific immunotherapy (SIT) is recommended for house dust allergy if all the measures  "above are not sufficient.    Tips and tricks:  Keep room temperature at 66-70 F and relative air humidity at a maximum of 50%.  Ideally, thoroughly air your home two to three times a day for 5 to 10 minutes each time.  Wash bed linens in at least 130 F every week.  Remove stuffed animals or freeze them every other week.  Keep ceiling fans off in the bedroom as they can stir up dust mite allergens.  Remove dust from furniture with a damp cloth and regularly wet mop floors.  Do not put pot and hydroponic plants in the bedroom and also avoid putting too many in living areas, as they increase room humidity.  When staying overnight in other accommodations, we recommend taking your own bed linen and the above anti-mite mattress covers with you.  Remove upholstered furniture from the bedroom and consider removing the carpets. Ideally, use sealed parquet or laminate rkystin, cork tiles or krystin made of wood, novilon or PVC.  Maybe additionally reduce dust mites in mattress, upholstery, or krystin using hot steam .      Modified from \"House Dust Mite Allergy\" by [employer] [facility].                    "

## 2023-08-09 NOTE — NURSING NOTE
Chief Complaint   Patient presents with    Allergy Consult     Here for allergy consult for chronic sinusitis, has had allergies his whole life.      Tina FONG, RN-BSN  Dermatology Surgery  [phone]

## 2023-08-09 NOTE — Clinical Note
8/9/2023         RE: Dion Thomson  [address] [facility] [address]        Dear Colleague,    Thank you for referring your patient, Dion Thomson, to the [facility] [place]. Please see a copy of my visit note below.    [facility] Dermato-allergology Note  Office visit  Encounter Date: Aug 9, 2023  ____________________________________________    CC: No chief complaint on file.      HPI:  (Aug 9, 2023)  Mr. Dion Thomson is a(n) 52 year old male who presents today as new patient for allergy consultation  - about 1.5 years ago patient lost the sense of smell with strong Rhinosinusitis and gets regularly Sinus infections. Patient had since childhood problems with stuffy nose and Conjunctivitis, but Sinusitis really started about 5 years ago  - was for about 1 year on Dupixent without much effect  - now on Budesonide washes and saline (few year)  - otherwise feeling well in usual state of health    Physical exam:  General: In no acute distress, well-developed, well-nourished  Eyes: no conjunctivitis  ENT: no signs of rhinitis   Pulmonary: no wheezing or coughing  Skin: Focused examination of the skin on test sites was performed = see test results below  {Skin Exam:980315}    Past Medical History:   Patient Active Problem List   Diagnosis     Esophageal reflux     Intermittent asthma     Obesity     URBANO (obstructive sleep apnea)     Hypercholesteremia     Encounter for long-term (current) use of medications     ACL tear     Past Medical History:   Diagnosis Date     Allergic rhinitis      Anxiety      Chronic sinusitis      Chronic sinusitis      Gastroesophageal reflux disease      Hypercholesteremia      Intermittent asthma 12/20/2013     Obstructive sleep apnea      URBANO (obstructive sleep apnea) 12/19/2014       Allergies:  Allergies   Allergen Reactions     Other [No Clinical Screening - See Comments] Hives     Pain medication - unknown - from 1st knee surgery        Medications:  Current Outpatient Medications   Medication     acetaminophen (TYLENOL) 500 MG tablet     albuterol (PROAIR HFA/PROVENTIL HFA/VENTOLIN HFA) 108 (90 Base) MCG/ACT inhaler     albuterol (PROVENTIL) (2.5 MG/3ML) 0.083% neb solution     atorvastatin (LIPITOR) 10 MG tablet     budesonide (PULMICORT) 0.25 MG/2ML neb solution     budesonide (RINOCORT AQUA) 32 MCG/ACT nasal spray     Cholecalciferol (VITAMIN D) 2000 UNITS tablet     dupilumab (DUPIXENT) 300 MG/2ML prefilled pen     fexofenadine (ALLEGRA) 180 MG tablet     fluticasone (FLONASE) 50 MCG/ACT nasal spray     fluticasone-salmeterol (AIRDUO RESPICLICK) 113-14 MCG/ACT inhaler     naproxen sodium 220 MG capsule     omeprazole (PRILOSEC) 20 MG DR capsule     predniSONE (DELTASONE) 20 MG tablet     sodium chloride 0.9 % neb solution     SYMBICORT 160-4.5 MCG/ACT Inhaler     vitamin B complex with vitamin C (STRESS TAB) tablet     No current facility-administered medications for this visit.       Social History:  The patient works as a emergency department. Patient has the following hobbies or non-occupational exposure: soccer    Family History:  Family History   Problem Relation Age of Onset     Diabetes Mother      Bipolar Disorder Mother      Anesthesia Reaction Mother         high tolerance     Coronary Artery Disease Father      Myocardial Infarction Father      Gout Brother      Hyperlipidemia Brother      Diabetes Maternal Grandmother      Myocardial Infarction Maternal Grandfather      Coronary Artery Disease Early Onset Maternal Grandfather      Unknown/Adopted Paternal Grandmother      Liver Cancer Paternal Grandfather      No Known Problems Daughter      No Known Problems Daughter      Diabetes Maternal Aunt      Mental Illness Maternal Aunt      Cancer Other         multiple, both sides     Heart Disease Other    in Hx children have immediate type reactions and wife and children have POTS and Mast cell activation syndrome    Previous  Labs, Allergy Tests, Dermatopathology, Imaging:  Had Sinus CT recently and mostly frontal Sinus blocked    Referred By: Willi Garcia MD  [address] [place] [address]     Allergy Tests:    Past Allergy Test    Order for Future Allergy Testing:    [x] Outpatient  [] Inpatient: [facility]..../ Bed ....       Skin Atopy (atopic dermatitis) [] Yes   [x] No .........  Contact allergies:   [] Yes   [x] No ..........  Hand eczema:   [] Yes   [x] No           Leading hand:   [] R   [] L       [] Ambidextrous         Drug allergies:        [] Yes   [x] No  which?......    Urticaria/Angioedema  [x] Yes   [] No .contact urticaria with cat and weeds.  Food Allergy:  [] Yes   [x] No  which?......  Pets :  [x] Yes   [] No  Which?..2 dogs, 2 cats, 1 rabbit, 6 guinea pigs --> some contact urticaria to cat scratch         [x]  Rhinitis   [x] Conjunctivitis   [x] Sinusitis   [] Polyposis   [] Otitis   [] Pharyngitis         [x]  Postnasal drip    []  none  Operations:   [] Tonsils   [] Septum   [] Sinus   [] Polyposis        [x] Asthma bronchiale   [] Coughing      []  none last years uses Albuterol   Symptoms (mostly Rhinoconjunctivitis and Asthma) aggravated by:  Season   [] I   [] II   [] III   [] IV   []V   []VI   []VII   []VIII   []IX   []X   []XI   []XII     [x] perennial   Day time      [] morning   [] noon      [] evening        [] night    [x] whole day........  []  none  Location/changes    [] inside        [] outside   [] mountains    [] sea     [] others.............   [x]  none  Triggers, specific     [] animals     [] plants     [] dust              [] others ...........................    [x]  none  Triggers, others       [] work          [] psyche    [] sport            [] others .............................  [x]  none  Irritant                [] phys efforts [] smoke    [] heat/cold     [] odors  []others............... [x]  none    Order for PATCH TESTS  Reason for tests (suspected allergy): not  necessary  Known previous allergies: none  Standardized panels  [] Standard panel (40 tests)  [] Preservatives & Antimicrobials (31 tests)  [] Emulsifiers & Additives (25 tests)   [] Perfumes/Flavours & Plants (25 tests)  [] Hairdresser panel (12 tests)  [] Rubber Chemicals (22 tests)  [] Plastics (26 tests)  [] Colorants/Dyes/Food additives (20 tests)  [] Metals (implants/dental) (24 tests)  [] Local anaesthetics/NSAIDs (13 tests)  [] Antibiotics & Antimycotics (14 tests)   [] Corticosteroids (15 tests)   [] Photopatch test (62 tests)   [] others: ...      [] Patient's own products: ...    DO NOT test if chemical or biological identity is unknown!     always ask from patient the product information and safety sheets (MSDS)       Order for PRICK TESTS    Reason for tests (suspected allergy):   Known previous allergies: had as teenager prick tests and was positive to many allergens, such as pets, molds, dust mites, grasses    Standardized prick panels  [] Atopic panel (20 tests)  [] Pediatric Panel (12 tests)  [] Milk, Meat, Eggs, Grains (20 tests)   [] Dust, Epithelia, Feathers (10 tests)  [] Fish, Seafood, Shellfish (17 tests)  [] Nuts, Beans (14 tests)  [] Spice, Vegetable, Fruit (17 tests)  [] Pollen Panel = Tree, Grass, Weed (24 tests)  [] Others: ...      [] Patient's own products: ...    DO NOT test if chemical or biological identity is unknown!     always ask from patient the product information and safety sheets (MSDS)     Standardized intradermal tests  [] Penicillium notatum [] Aspergillus fumigatus [] House dust mites D.far & D. pteron  [] Cat    [] dog  [] Others: ...  [] Bee venom   [] Wasp venom  !!Specific protocol with dilutions!!       Order for Drug allergy tests (prick & Intradermal & *** patch tests)    [] Penicillin G  [] Ampicillin [] Cefazolin   [] Ceftriaxone   [] Ceftazidime  [] Bactrim    [] Others: ...  Order for ... as test date    Atopy Screen (Placed Aug 9, 2023)  No Substance Readings  (15 min) Evaluation   POS Histamine 1mg/ml ++    NEG NaCl 0.9% - dermographism     No Substance Readings (15 min) Evaluation   1 Alternaria alternata (tenuis)  -    2 Cladosporium herbarum +    3 Aspergillus fumigatus -    4 Penicillium notatum -    5 Dermatophagoides pteronyssinus +++    6 Dermatophagoides farinae +++    7 Dog epithelium (canis spp) -    8 Cat hair (jelani catus) +++    9 Cockroach   (Blatella americana & germanica) +    10 Grass mix midwest   (Kath, Orchard, Redtop, Spike) ++    11 Nav grass (sorghum halepense) -    12 Weed mix   (common Cocklebur, Lamb s quarters, rough redroot Pigweed, Dock/Sorrel) -    13 Mug wort (artemisia vulgare) -    14 Ragweed giant/short (ambrosia spp) +/++    15 White birch (Betula papyrifera) +++    16 Tree mix 1 (Pecan, Maple BHR, Oak RVW, american [place], black Dumont) ++    17 Red cedar (juniperus virginia) -    18 Tree mix 2   (white Donn, river/red Birch, black Lyons, common Haakon, american Elm) ++    19 Box elder/Maple mix (acer spp) -    20 Alfalfa shagbark (carya ovata) +/++           Conclusion    Dust, Epithelia, Feathers (Placed Aug 9, 2023)    No Substance Readings (15min) Evaluation   1 Guinea pig (+)    5 Rabbit epithelium (+)    Conclusions:       ________________________________    Assessment & Plan:    ==> Final Diagnosis:     # atopic predisposition with    Perennial RC, Rhinosinusitis, PND and sometimes Asthma = strong and relevant sensitization to house dust mites    Clinically not very relevant sensitization to tree>grass>ragweed pollens    Sensitization to cat allergen, much less to dog, guinea pig and rabbit    Positive family hx  * chronic illness with exacerbation, progression, side effects from treatment    # recurrent severe Rhinosinusitis = allergic component ==> HOUSE DUST MITES?  * chronic illness with exacerbation, progression, side effects from treatment      These conclusions are made at the best of one's knowledge and  belief based on the provided evidence such as patient's history and allergy test results and they can change over time or can be incomplete because of missing information's.    ==> Treatment Plan:  >> info given HOUSE DUST MITES reduction  >> info given for oral IT for HOUSE DUST MITES (Odactra) and maybe also consider IT with [place] (Clustek Max)  >> try at bedtime Allegra 180mg and Montelukast 10mg    Procedures Performed: Allergy tests, including prick tests    Staff:  Provider    Follow-up in Derm-Allergy clinic in few months to discuss further treatement    I spent a total of 38 minutes with Dion Thomson. This time was spent counseling the patient and/or coordinating care, explaining the allergy tests *** or procedures, performing allergy tests and assessing the clinical relevance.          Again, thank you for allowing me to participate in the care of your patient.        Sincerely,        James Gomez MD

## 2023-08-10 DIAGNOSIS — K21.9 GASTROESOPHAGEAL REFLUX DISEASE WITHOUT ESOPHAGITIS: ICD-10-CM

## 2023-08-10 NOTE — TELEPHONE ENCOUNTER
omeprazole (PRILOSEC) 20 MG DR capsul     LOV 4/1/22- not related    Has 11/27/23 appt with [facility] scheduled    Last notes from 7/26/2021       Gastroesophageal reflux disease with esophagitis without hemorrhage  Discussed the functional nature of this condition regarding what they eat, how they eat, when they eat, and how much they eat as all contributing to gastroesophageal symptoms.

## 2023-08-11 ENCOUNTER — TELEPHONE (OUTPATIENT)
Dept: OTOLARYNGOLOGY | Facility: CLINIC | Age: 53
End: 2023-08-11
Payer: COMMERCIAL

## 2023-08-14 ASSESSMENT — SLEEP AND FATIGUE QUESTIONNAIRES
HOW LIKELY ARE YOU TO NOD OFF OR FALL ASLEEP WHILE SITTING QUIETLY AFTER LUNCH WITHOUT ALCOHOL: SLIGHT CHANCE OF DOZING
HOW LIKELY ARE YOU TO NOD OFF OR FALL ASLEEP WHILE SITTING AND READING: HIGH CHANCE OF DOZING
HOW LIKELY ARE YOU TO NOD OFF OR FALL ASLEEP WHILE WATCHING TV: HIGH CHANCE OF DOZING
HOW LIKELY ARE YOU TO NOD OFF OR FALL ASLEEP IN A CAR, WHILE STOPPED FOR A FEW MINUTES IN TRAFFIC: SLIGHT CHANCE OF DOZING
HOW LIKELY ARE YOU TO NOD OFF OR FALL ASLEEP WHILE SITTING AND TALKING TO SOMEONE: WOULD NEVER DOZE
HOW LIKELY ARE YOU TO NOD OFF OR FALL ASLEEP WHILE SITTING INACTIVE IN A PUBLIC PLACE: SLIGHT CHANCE OF DOZING
HOW LIKELY ARE YOU TO NOD OFF OR FALL ASLEEP WHILE LYING DOWN TO REST IN THE AFTERNOON WHEN CIRCUMSTANCES PERMIT: HIGH CHANCE OF DOZING
HOW LIKELY ARE YOU TO NOD OFF OR FALL ASLEEP WHEN YOU ARE A PASSENGER IN A CAR FOR AN HOUR WITHOUT A BREAK: MODERATE CHANCE OF DOZING

## 2023-08-16 ENCOUNTER — OFFICE VISIT (OUTPATIENT)
Dept: SLEEP MEDICINE | Facility: CLINIC | Age: 53
End: 2023-08-16
Payer: COMMERCIAL

## 2023-08-16 VITALS
OXYGEN SATURATION: 98 % | SYSTOLIC BLOOD PRESSURE: 131 MMHG | HEIGHT: 69 IN | HEART RATE: 74 BPM | DIASTOLIC BLOOD PRESSURE: 73 MMHG | BODY MASS INDEX: 32.76 KG/M2 | WEIGHT: 221.2 LBS

## 2023-08-16 DIAGNOSIS — G47.33 OBSTRUCTIVE SLEEP APNEA: Primary | ICD-10-CM

## 2023-08-16 PROCEDURE — 99203 OFFICE O/P NEW LOW 30 MIN: CPT | Performed by: INTERNAL MEDICINE
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
## 2023-08-16 NOTE — NURSING NOTE
"Chief Complaint   Patient presents with    Sleep Apnea       Initial /73   Pulse 74   Ht 1.753 m (5' 9\")   Wt 100.3 kg (221 lb 3.2 oz)   SpO2 98%   BMI 32.67 kg/m   Estimated body mass index is 32.67 kg/m  as calculated from the following:    Height as of this encounter: 1.753 m (5' 9\").    Weight as of this encounter: 100.3 kg (221 lb 3.2 oz).    Medication Reconciliation: complete    Neck circumference:     DME: [facility]     Future MyChart message sent reminding patient of 2 year follow up appointment.     Sanaz Jeffers MA    "

## 2023-08-25 ENCOUNTER — DOCUMENTATION ONLY (OUTPATIENT)
Dept: SLEEP MEDICINE | Facility: CLINIC | Age: 53
End: 2023-08-25
Payer: COMMERCIAL

## 2023-08-25 DIAGNOSIS — G47.33 OBSTRUCTIVE SLEEP APNEA (ADULT) (PEDIATRIC): Primary | ICD-10-CM

## 2023-08-25 NOTE — PROGRESS NOTES
Patient was offered choice of vendor and chose [facility].  Patient Dion Thomson was set up at [place] on August 25, 2023. Patient received a Resmed Airsense 11 Pressures were set at  7-11 cm H2O.   Patient s ramp is UNKNOWN cm H2O for UNKOWN and FLEX/EPR is UNKOWN.  Patient received a Resmed Mask name: TRAN FX  Pillow mask size Medium, heated tubing and heated humidifier.  Patient has the following compliance requirements: none.  Jona Howe

## 2023-10-06 DIAGNOSIS — E78.00 HYPERCHOLESTEREMIA: ICD-10-CM

## 2023-10-06 RX ORDER — ATORVASTATIN CALCIUM 10 MG/1
10 TABLET, FILM COATED ORAL DAILY
Qty: 30 TABLET | Refills: 11 | Status: SHIPPED | OUTPATIENT
Start: 2023-10-06

## 2023-10-06 NOTE — TELEPHONE ENCOUNTER
Med: Atorvastatin        LOV (related): 7/26/21    Last Lab: 7/26/21      Due for F/U around: None       Next Appt: 11/27/23 with Miguel          Cholesterol   Date Value Ref Range Status   07/26/2021 171 100 - 199 mg/dL Final   10/06/2020 187 100 - 199 mg/dL Final     HDL Cholesterol   Date Value Ref Range Status   07/26/2021 43 >39 mg/dL Final   10/06/2020 45 >39 mg/dL Final     LDL Cholesterol Calculated   Date Value Ref Range Status   07/26/2021 104 (H) 0 - 99 mg/dL Final   10/06/2020 117 (H) 0 - 99 mg/dL Final     Triglycerides   Date Value Ref Range Status   07/26/2021 134 0 - 149 mg/dL Final   10/06/2020 141 0 - 149 mg/dL Final     No results found for: CHOLLUIS

## 2023-11-27 NOTE — PATIENT INSTRUCTIONS
Preparing for Your Surgery  Getting started  A nurse will call you to review your health history and instructions. They will give you an arrival time based on your scheduled surgery time. Please be ready to share:  Your doctor's clinic name and phone number  Your medical, surgical, and anesthesia history  A list of allergies and sensitivities  A list of medicines, including herbal treatments and over-the-counter drugs  Whether the patient has a legal guardian (ask how to send us the papers in advance)  Please tell us if you're pregnant--or if there's any chance you might be pregnant. Some surgeries may injure a fetus (unborn baby), so they require a pregnancy test. Surgeries that are safe for a fetus don't always need a test, and you can choose whether to have one.   If you have a child who's having surgery, please ask for a copy of Preparing for Your Child's Surgery.    Preparing for surgery  Within 10 to 30 days of surgery: Have a pre-op exam (sometimes called an H&P, or History and Physical). This can be done at a clinic or pre-operative center.  If you're having a , you may not need this exam. Talk to your care team.  At your pre-op exam, talk to your care team about all medicines you take. If you need to stop any medicines before surgery, ask when to start taking them again.  We do this for your safety. Many medicines can make you bleed too much during surgery. Some change how well surgery (anesthesia) drugs work.  Call your insurance company to let them know you're having surgery. (If you don't have insurance, call [phone].)  Call your clinic if there's any change in your health. This includes signs of a cold or flu (sore throat, runny nose, cough, rash, fever). It also includes a scrape or scratch near the surgery site.  If you have questions on the day of surgery, call your hospital or surgery center.  Eating and drinking guidelines  For your safety: Unless your surgeon tells you otherwise,  follow the guidelines below.  Eat and drink as usual until 8 hours before you arrive for surgery. After that, no food or milk.  Drink clear liquids until 2 hours before you arrive. These are liquids you can see through, like water, Gatorade, and Propel Water. They also include plain black coffee and tea (no cream or milk), candy, and breath mints. You can spit out gum when you arrive.  If you drink alcohol: Stop drinking it the night before surgery.  If your care team tells you to take medicine on the morning of surgery, it's okay to take it with a sip of water.  Preventing infection  Shower or bathe the night before and morning of your surgery. Follow the instructions your clinic gave you. (If no instructions, use regular soap.)  Don't shave or clip hair near your surgery site. We'll remove the hair if needed.  Don't smoke or vape the morning of surgery. You may chew nicotine gum up to 2 hours before surgery. A nicotine patch is okay.  Note: Some surgeries require you to completely quit smoking and nicotine. Check with your surgeon.  Your care team will make every effort to keep you safe from infection. We will:  Clean our hands often with soap and water (or an alcohol-based hand rub).  Clean the skin at your surgery site with a special soap that kills germs.  Give you a special gown to keep you warm. (Cold raises the risk of infection.)  Wear special hair covers, masks, gowns and gloves during surgery.  Give antibiotic medicine, if prescribed. Not all surgeries need antibiotics.  What to bring on the day of surgery  Photo ID and insurance card  Copy of your health care directive, if you have one  Glasses and hearing aids (bring cases)  You can't wear contacts during surgery  Inhaler and eye drops, if you use them (tell us about these when you arrive)  CPAP machine or breathing device, if you use them  A few personal items, if spending the night  If you have . . .  A pacemaker, ICD (cardiac defibrillator) or other  implant: Bring the ID card.  An implanted stimulator: Bring the remote control.  A legal guardian: Bring a copy of the certified (court-stamped) guardianship papers.  Please remove any jewelry, including body piercings. Leave jewelry and other valuables at home.  If you're going home the day of surgery  You must have a responsible adult drive you home. They should stay with you overnight as well.  If you don't have someone to stay with you, and you aren't safe to go home alone, we may keep you overnight. Insurance often won't pay for this.  After surgery  If it's hard to control your pain or you need more pain medicine, please call your surgeon's office.  Questions?   If you have any questions for your care team, list them here: _________________________________________________________________________________________________________________________________________________________________________ ____________________________________ ____________________________________ ____________________________________  For informational purposes only. Not to replace the advice of your health care provider. Copyright   2003, 2019 [facility]. All rights reserved. Clinically reviewed by Nichelle Thomas MD. [employer] 347112 - REV 12/22.    How to Take Your Medication Before Surgery  - Take all of your medications before surgery as usual

## 2023-11-27 NOTE — PROGRESS NOTES
[facility] [address] [place] [address]  Phone: [phone]  Fax: [phone]  Primary Provider: Susan Rivera  Pre-op Performing Provider: SUSAN RIVERA      PREOPERATIVE EVALUATION:  Today's date: 11/28/2023    Dion is a 53 year old, presenting for the following:  Pre-Op Exam (Surgery/Procedure: STEALTH GUIDED BILATERAL FRONTAL SINUSOTOMY /Surgery Location: [facility] /Surgeon: Willi Garcia MD /Surgery Date: 12/18/23/Time of Surgery: 1230)      Surgical Information:  Surgery/Procedure: STEALTH GUIDED BILATERAL FRONTAL SINUSOTOMY   Surgery Location: [facility]   Surgeon: Willi Garcia MD   Surgery Date: 12/18/23  Time of Surgery: 1230  Where patient plans to recover: At home with family  Fax number for surgical facility: Note does not need to be faxed, will be available electronically in Epic.  Preoperative Questionnaire:   No - Have you ever had a heart attack or stroke?  No - Have you ever had surgery on your heart or blood vessels, such as a stent, coronary (heart) bypass, or surgery on an artery in the head, neck, heart, or legs?  No - Do you have chest pain when you are physically active?  No - Do you have a history of heart failure?  No - Do you currently have a cold, bronchitis, or symptoms of other respiratory (head and chest) infections?  yes - Do you have a cough, shortness of breath, or wheezing? Possible sinus infection   No - Do you or anyone in your family have a history of blood clots?  No - Do you or anyone in your family have a serious bleeding problem, such as long-lasting bleeding after surgeries or cuts?  No - Have you ever had anemia or been told to take iron pills?  No - Have you had any abnormal blood loss such as black, tarry or bloody stools, or abnormal vaginal bleeding?  No - Have you ever had a blood transfusion?  Yes - Are you willing to have a  blood transfusion if it is medically needed before, during, or after your surgery?  Yes - Have you or anyone in your family ever had problems with anesthesia (sedation for surgery)? wife  Yes - Do you have sleep apnea, excessive snoring, or daytime drowsiness?   No - Do you have any artifical heart valves or other implanted medical devices, such as a pacemaker, defibrillator, or continuous glucose monitor?  No - Do you have any artifical joints?  No - Are you allergic to latex?  No - Is there any chance that you may be pregnant?     Assessment & Plan     The proposed surgical procedure is considered LOW risk.    Preop general physical exam  Prior to Chronic maxillary sinusitis repair    Hypercholesteremia  Stable     Gastroesophageal reflux disease with esophagitis without hemorrhage  Under control with current meds.    Mild intermittent asthma without complication  Stable on inhalers.       - No identified additional risk factors other than previously addressed    Antiplatelet or Anticoagulation Medication Instructions:   - Patient is on no antiplatelet or anticoagulation medications.    Additional Medication Instructions:  Patient is to take all scheduled medications on the day of surgery    RECOMMENDATION:  APPROVAL GIVEN to proceed with proposed procedure, without further diagnostic evaluation.    Subjective       HPI related to upcoming procedure: Many years of sinus congestion and problems now ready for opening procedure.      Health Care Directive:  Patient does not have a Health Care Directive or Living Will:     Preoperative Review of :      Status of Chronic Conditions:  See problem list for active medical problems.  Problems all longstanding and stable, except as noted/documented.  See ROS for pertinent symptoms related to these conditions.    Review of Systems  Constitutional, neuro, ENT, endocrine, pulmonary, cardiac, gastrointestinal, genitourinary, musculoskeletal, integument and psychiatric  systems are negative, except as otherwise noted.    Patient Active Problem List    Diagnosis Date Noted    Chronic frontal sinusitis 08/07/2023     Priority: Medium    Encounter for long-term (current) use of medications 01/31/2018     Priority: Medium    Hypercholesteremia 04/21/2017     Priority: Medium    URBANO (obstructive sleep apnea) 12/19/2014     Priority: Medium    ACL tear 02/14/2014     Priority: Medium     Overview:   Left knee 1997      Intermittent asthma 12/20/2013     Priority: Medium    Obesity 12/20/2013     Priority: Medium    Esophageal reflux 08/08/2011     Priority: Medium      Past Medical History:   Diagnosis Date    Allergic rhinitis     Anxiety     Chronic sinusitis     Chronic sinusitis     Gastroesophageal reflux disease     Hypercholesteremia     Intermittent asthma 12/20/2013    Obstructive sleep apnea     URBANO (obstructive sleep apnea) 12/19/2014     Past Surgical History:   Procedure Laterality Date    ARTHROSCOPIC RECONSTRUCTION ANTERIOR CRUCIATE LIGAMENT  1997    Open, Knee Left    ARTHROSCOPY KNEE RT/LT Left 04/2005    meniscus repair    EXTRACTION(S) DENTAL      OPTICAL TRACKING SYSTEM ENDOSCOPIC SINUS SURGERY Bilateral 9/30/2019    Procedure: Image Guided Bilateral Functional Endoscopic Sinus Surgery, Bilateral turbinate reduction;  Surgeon: Willi Garcia MD;  Location: [facility]    TONSILLECTOMY & ADENOIDECTOMY  5 yo    VASECTOMY      Vasectomy     Current Outpatient Medications   Medication Sig Dispense Refill    acetaminophen (TYLENOL) 500 MG tablet Take 1,000 mg by mouth every 6 hours as needed for mild pain (Pt. last took approximately 1 week ago 09/26/2019)      albuterol (PROAIR HFA/PROVENTIL HFA/VENTOLIN HFA) 108 (90 Base) MCG/ACT inhaler INHALE 2 PUFFS INTO THE LUNGS EVERY 6 HOURS AS NEEDED FOR SHORTNESS OF BREATH / DYSPNEA 18 g 11    atorvastatin (LIPITOR) 10 MG tablet Take 1 tablet (10 mg) by mouth daily 30 tablet 11    budesonide (RINOCORT AQUA) 32 MCG/ACT nasal spray Spray  1 spray into both nostrils daily 8 mL 11    Cholecalciferol (VITAMIN D) 2000 UNITS tablet Take 2,000 Units by mouth every morning  100 tablet 3    fexofenadine (ALLEGRA) 180 MG tablet Take 1 tablet (180 mg) by mouth At Bedtime 30 tablet 3    fluticasone-salmeterol (AIRDUO RESPICLICK) 113-14 MCG/ACT inhaler TAKE 1 PUFF BY MOUTH TWICE A DAY 1 each 11    montelukast (SINGULAIR) 10 MG tablet Take 1 tablet (10 mg) by mouth At Bedtime 30 tablet 3    naproxen sodium 220 MG capsule Take 220 mg by mouth as needed (Pt. last used 09/24/2019)      omeprazole (PRILOSEC) 20 MG DR capsule TAKE 1 CAPSULE BY MOUTH EVERY DAY IN THE MORNING BEFORE BREAKFAST 30 capsule 3    predniSONE (DELTASONE) 20 MG tablet Take 3 tabs by mouth daily x 3 days, then 2 tabs daily x 3 days, then 1 tab daily x 3 days, then 1/2 tab daily x 4 days. 20 tablet 0    vitamin B complex with vitamin C (STRESS TAB) tablet Take 1 tablet by mouth every morning       albuterol (PROVENTIL) (2.5 MG/3ML) 0.083% neb solution Take 1 vial (2.5 mg) by nebulization every 6 hours as needed for shortness of breath / dyspnea or wheezing (Patient not taking: Reported on 8/16/2023) 90 mL 0    budesonide (PULMICORT) 0.25 MG/2ML neb solution MIX 4 MLS (0.5 MG) WITH 10MLS OF NORMAL SALINE IN LARGE SYRINGE, INSTILL HALF OF MIXTURE INTO BOTH NOSTRILS THREE TIMES A DAY. (Patient not taking: Reported on 8/16/2023) 240 mL 11    sodium chloride 0.9 % neb solution PLEASE SEE ATTACHED FOR DETAILED DIRECTIONS (Patient not taking: Reported on 11/28/2023)         No Known Allergies     Social History     Tobacco Use    Smoking status: Never    Smokeless tobacco: Never   Substance Use Topics    Alcohol use: Yes     Alcohol/week: 0.0 - 0.8 standard drinks of alcohol     Comment: Occasional     Family History   Problem Relation Age of Onset    Diabetes Mother     Bipolar Disorder Mother     Anesthesia Reaction Mother         high tolerance    Coronary Artery Disease Father     Myocardial  Infarction Father     Gout Brother     Hyperlipidemia Brother     Diabetes Maternal Grandmother     Myocardial Infarction Maternal Grandfather     Coronary Artery Disease Early Onset Maternal Grandfather     Unknown/Adopted Paternal Grandmother     Liver Cancer Paternal Grandfather     No Known Problems Daughter     No Known Problems Daughter     Diabetes Maternal Aunt     Mental Illness Maternal Aunt     Cancer Other         multiple, both sides    Heart Disease Other      History   Drug Use No         Objective     /78   Pulse 76   Wt 101.1 kg (222 lb 12.8 oz)   SpO2 98%   BMI 32.90 kg/m      Physical Exam    GENERAL APPEARANCE: healthy, alert and no distress     EYES: EOMI,  PERRL     HENT: ear canals and TM's normal and nose and mouth without ulcers or lesions     NECK: no adenopathy, no asymmetry, masses, or scars and thyroid normal to palpation     RESP: lungs clear to auscultation - no rales, rhonchi or wheezes     CV: regular rates and rhythm, normal S1 S2, no S3 or S4 and no murmur, click or rub     ABDOMEN:  soft, nontender, no HSM or masses and bowel sounds normal     MS: extremities normal- no gross deformities noted, no evidence of inflammation in joints, FROM in all extremities.     SKIN: no suspicious lesions or rashes     NEURO: Normal strength and tone, sensory exam grossly normal, mentation intact and speech normal     PSYCH: mentation appears normal. and affect normal/bright     LYMPHATICS: No cervical adenopathy    Recent Labs   Lab Test 03/13/22  1249   HGB 15.0           Diagnostics:  No labs were ordered during this visit.   No EKG required for low risk surgery (cataract, skin procedure, breast biopsy, etc).    Revised Cardiac Risk Index (RCRI):  The patient has the following serious cardiovascular risks for perioperative complications:   - No serious cardiac risks = 0 points     RCRI Interpretation: 0 points: Class I (very low risk - 0.4% complication rate)         Signed  Electronically by: Viral Rivera MD  Copy of this evaluation report is provided to requesting physician.

## 2023-11-27 NOTE — H&P (VIEW-ONLY)
[facility] [address] [place] [address]  Phone: [phone]  Fax: [phone]  Primary Provider: Susan Rivera  Pre-op Performing Provider: SUSAN RIVERA      PREOPERATIVE EVALUATION:  Today's date: 11/28/2023    Dion is a 53 year old, presenting for the following:  Pre-Op Exam (Surgery/Procedure: STEALTH GUIDED BILATERAL FRONTAL SINUSOTOMY /Surgery Location: [facility] /Surgeon: Willi Garcia MD /Surgery Date: 12/18/23/Time of Surgery: 1230)      Surgical Information:  Surgery/Procedure: STEALTH GUIDED BILATERAL FRONTAL SINUSOTOMY   Surgery Location: [facility]   Surgeon: Willi Garcia MD   Surgery Date: 12/18/23  Time of Surgery: 1230  Where patient plans to recover: At home with family  Fax number for surgical facility: Note does not need to be faxed, will be available electronically in Epic.  Preoperative Questionnaire:   No - Have you ever had a heart attack or stroke?  No - Have you ever had surgery on your heart or blood vessels, such as a stent, coronary (heart) bypass, or surgery on an artery in the head, neck, heart, or legs?  No - Do you have chest pain when you are physically active?  No - Do you have a history of heart failure?  No - Do you currently have a cold, bronchitis, or symptoms of other respiratory (head and chest) infections?  yes - Do you have a cough, shortness of breath, or wheezing? Possible sinus infection   No - Do you or anyone in your family have a history of blood clots?  No - Do you or anyone in your family have a serious bleeding problem, such as long-lasting bleeding after surgeries or cuts?  No - Have you ever had anemia or been told to take iron pills?  No - Have you had any abnormal blood loss such as black, tarry or bloody stools, or abnormal vaginal bleeding?  No - Have you ever had a blood transfusion?  Yes - Are you willing to have a  blood transfusion if it is medically needed before, during, or after your surgery?  Yes - Have you or anyone in your family ever had problems with anesthesia (sedation for surgery)? wife  Yes - Do you have sleep apnea, excessive snoring, or daytime drowsiness?   No - Do you have any artifical heart valves or other implanted medical devices, such as a pacemaker, defibrillator, or continuous glucose monitor?  No - Do you have any artifical joints?  No - Are you allergic to latex?  No - Is there any chance that you may be pregnant?     Assessment & Plan     The proposed surgical procedure is considered LOW risk.    Preop general physical exam  Prior to Chronic maxillary sinusitis repair    Hypercholesteremia  Stable     Gastroesophageal reflux disease with esophagitis without hemorrhage  Under control with current meds.    Mild intermittent asthma without complication  Stable on inhalers.       - No identified additional risk factors other than previously addressed    Antiplatelet or Anticoagulation Medication Instructions:   - Patient is on no antiplatelet or anticoagulation medications.    Additional Medication Instructions:  Patient is to take all scheduled medications on the day of surgery    RECOMMENDATION:  APPROVAL GIVEN to proceed with proposed procedure, without further diagnostic evaluation.    Subjective       HPI related to upcoming procedure: Many years of sinus congestion and problems now ready for opening procedure.      Health Care Directive:  Patient does not have a Health Care Directive or Living Will:     Preoperative Review of :      Status of Chronic Conditions:  See problem list for active medical problems.  Problems all longstanding and stable, except as noted/documented.  See ROS for pertinent symptoms related to these conditions.    Review of Systems  Constitutional, neuro, ENT, endocrine, pulmonary, cardiac, gastrointestinal, genitourinary, musculoskeletal, integument and psychiatric  systems are negative, except as otherwise noted.    Patient Active Problem List    Diagnosis Date Noted    Chronic frontal sinusitis 08/07/2023     Priority: Medium    Encounter for long-term (current) use of medications 01/31/2018     Priority: Medium    Hypercholesteremia 04/21/2017     Priority: Medium    URBANO (obstructive sleep apnea) 12/19/2014     Priority: Medium    ACL tear 02/14/2014     Priority: Medium     Overview:   Left knee 1997      Intermittent asthma 12/20/2013     Priority: Medium    Obesity 12/20/2013     Priority: Medium    Esophageal reflux 08/08/2011     Priority: Medium      Past Medical History:   Diagnosis Date    Allergic rhinitis     Anxiety     Chronic sinusitis     Chronic sinusitis     Gastroesophageal reflux disease     Hypercholesteremia     Intermittent asthma 12/20/2013    Obstructive sleep apnea     URBANO (obstructive sleep apnea) 12/19/2014     Past Surgical History:   Procedure Laterality Date    ARTHROSCOPIC RECONSTRUCTION ANTERIOR CRUCIATE LIGAMENT  1997    Open, Knee Left    ARTHROSCOPY KNEE RT/LT Left 04/2005    meniscus repair    EXTRACTION(S) DENTAL      OPTICAL TRACKING SYSTEM ENDOSCOPIC SINUS SURGERY Bilateral 9/30/2019    Procedure: Image Guided Bilateral Functional Endoscopic Sinus Surgery, Bilateral turbinate reduction;  Surgeon: Willi Garcia MD;  Location: [facility] OR    TONSILLECTOMY & ADENOIDECTOMY  5 yo    VASECTOMY      Vasectomy     Current Outpatient Medications   Medication Sig Dispense Refill    acetaminophen (TYLENOL) 500 MG tablet Take 1,000 mg by mouth every 6 hours as needed for mild pain (Pt. last took approximately 1 week ago 09/26/2019)      albuterol (PROAIR HFA/PROVENTIL HFA/VENTOLIN HFA) 108 (90 Base) MCG/ACT inhaler INHALE 2 PUFFS INTO THE LUNGS EVERY 6 HOURS AS NEEDED FOR SHORTNESS OF BREATH / DYSPNEA 18 g 11    atorvastatin (LIPITOR) 10 MG tablet Take 1 tablet (10 mg) by mouth daily 30 tablet 11    budesonide (RINOCORT AQUA) 32 MCG/ACT nasal spray Spray  1 spray into both nostrils daily 8 mL 11    Cholecalciferol (VITAMIN D) 2000 UNITS tablet Take 2,000 Units by mouth every morning  100 tablet 3    fexofenadine (ALLEGRA) 180 MG tablet Take 1 tablet (180 mg) by mouth At Bedtime 30 tablet 3    fluticasone-salmeterol (AIRDUO RESPICLICK) 113-14 MCG/ACT inhaler TAKE 1 PUFF BY MOUTH TWICE A DAY 1 each 11    montelukast (SINGULAIR) 10 MG tablet Take 1 tablet (10 mg) by mouth At Bedtime 30 tablet 3    naproxen sodium 220 MG capsule Take 220 mg by mouth as needed (Pt. last used 09/24/2019)      omeprazole (PRILOSEC) 20 MG DR capsule TAKE 1 CAPSULE BY MOUTH EVERY DAY IN THE MORNING BEFORE BREAKFAST 30 capsule 3    predniSONE (DELTASONE) 20 MG tablet Take 3 tabs by mouth daily x 3 days, then 2 tabs daily x 3 days, then 1 tab daily x 3 days, then 1/2 tab daily x 4 days. 20 tablet 0    vitamin B complex with vitamin C (STRESS TAB) tablet Take 1 tablet by mouth every morning       albuterol (PROVENTIL) (2.5 MG/3ML) 0.083% neb solution Take 1 vial (2.5 mg) by nebulization every 6 hours as needed for shortness of breath / dyspnea or wheezing (Patient not taking: Reported on 8/16/2023) 90 mL 0    budesonide (PULMICORT) 0.25 MG/2ML neb solution MIX 4 MLS (0.5 MG) WITH 10MLS OF NORMAL SALINE IN LARGE SYRINGE, INSTILL HALF OF MIXTURE INTO BOTH NOSTRILS THREE TIMES A DAY. (Patient not taking: Reported on 8/16/2023) 240 mL 11    sodium chloride 0.9 % neb solution PLEASE SEE ATTACHED FOR DETAILED DIRECTIONS (Patient not taking: Reported on 11/28/2023)         No Known Allergies     Social History     Tobacco Use    Smoking status: Never    Smokeless tobacco: Never   Substance Use Topics    Alcohol use: Yes     Alcohol/week: 0.0 - 0.8 standard drinks of alcohol     Comment: Occasional     Family History   Problem Relation Age of Onset    Diabetes Mother     Bipolar Disorder Mother     Anesthesia Reaction Mother         high tolerance    Coronary Artery Disease Father     Myocardial  Infarction Father     Gout Brother     Hyperlipidemia Brother     Diabetes Maternal Grandmother     Myocardial Infarction Maternal Grandfather     Coronary Artery Disease Early Onset Maternal Grandfather     Unknown/Adopted Paternal Grandmother     Liver Cancer Paternal Grandfather     No Known Problems Daughter     No Known Problems Daughter     Diabetes Maternal Aunt     Mental Illness Maternal Aunt     Cancer Other         multiple, both sides    Heart Disease Other      History   Drug Use No         Objective     /78   Pulse 76   Wt 101.1 kg (222 lb 12.8 oz)   SpO2 98%   BMI 32.90 kg/m      Physical Exam    GENERAL APPEARANCE: healthy, alert and no distress     EYES: EOMI,  PERRL     HENT: ear canals and TM's normal and nose and mouth without ulcers or lesions     NECK: no adenopathy, no asymmetry, masses, or scars and thyroid normal to palpation     RESP: lungs clear to auscultation - no rales, rhonchi or wheezes     CV: regular rates and rhythm, normal S1 S2, no S3 or S4 and no murmur, click or rub     ABDOMEN:  soft, nontender, no HSM or masses and bowel sounds normal     MS: extremities normal- no gross deformities noted, no evidence of inflammation in joints, FROM in all extremities.     SKIN: no suspicious lesions or rashes     NEURO: Normal strength and tone, sensory exam grossly normal, mentation intact and speech normal     PSYCH: mentation appears normal. and affect normal/bright     LYMPHATICS: No cervical adenopathy    Recent Labs   Lab Test 03/13/22  1249   HGB 15.0           Diagnostics:  No labs were ordered during this visit.   No EKG required for low risk surgery (cataract, skin procedure, breast biopsy, etc).    Revised Cardiac Risk Index (RCRI):  The patient has the following serious cardiovascular risks for perioperative complications:   - No serious cardiac risks = 0 points     RCRI Interpretation: 0 points: Class I (very low risk - 0.4% complication rate)         Signed  Electronically by: Viral Rivera MD  Copy of this evaluation report is provided to requesting physician.

## 2023-11-28 ENCOUNTER — OFFICE VISIT (OUTPATIENT)
Dept: FAMILY MEDICINE | Facility: CLINIC | Age: 53
End: 2023-11-28

## 2023-11-28 VITALS
HEART RATE: 76 BPM | SYSTOLIC BLOOD PRESSURE: 120 MMHG | WEIGHT: 222.8 LBS | BODY MASS INDEX: 32.9 KG/M2 | DIASTOLIC BLOOD PRESSURE: 78 MMHG | OXYGEN SATURATION: 98 %

## 2023-11-28 DIAGNOSIS — E78.00 HYPERCHOLESTEREMIA: ICD-10-CM

## 2023-11-28 DIAGNOSIS — J32.0 CHRONIC MAXILLARY SINUSITIS: ICD-10-CM

## 2023-11-28 DIAGNOSIS — Z01.818 PREOP GENERAL PHYSICAL EXAM: Primary | ICD-10-CM

## 2023-11-28 DIAGNOSIS — K21.00 GASTROESOPHAGEAL REFLUX DISEASE WITH ESOPHAGITIS WITHOUT HEMORRHAGE: ICD-10-CM

## 2023-11-28 DIAGNOSIS — J45.20 MILD INTERMITTENT ASTHMA WITHOUT COMPLICATION: ICD-10-CM

## 2023-11-28 PROCEDURE — 99214 OFFICE O/P EST MOD 30 MIN: CPT | Performed by: FAMILY MEDICINE

## 2023-11-28 RX ORDER — FLUTICASONE PROPIONATE AND SALMETEROL 232; 14 UG/1; UG/1
1 POWDER, METERED RESPIRATORY (INHALATION) 2 TIMES DAILY
Qty: 1 EACH | Refills: 11 | Status: SHIPPED | OUTPATIENT
Start: 2023-11-28

## 2023-12-12 DIAGNOSIS — J45.21 MILD INTERMITTENT ASTHMA WITH ACUTE EXACERBATION: ICD-10-CM

## 2023-12-12 DIAGNOSIS — J30.1 NON-SEASONAL ALLERGIC RHINITIS DUE TO POLLEN: ICD-10-CM

## 2023-12-12 DIAGNOSIS — Z91.09 HOUSE DUST MITE ALLERGY: ICD-10-CM

## 2023-12-14 DIAGNOSIS — K21.9 GASTROESOPHAGEAL REFLUX DISEASE WITHOUT ESOPHAGITIS: ICD-10-CM

## 2023-12-15 ENCOUNTER — ANESTHESIA EVENT (OUTPATIENT)
Dept: SURGERY | Facility: AMBULATORY SURGERY CENTER | Age: 53
End: 2023-12-15
Payer: COMMERCIAL

## 2023-12-15 NOTE — TELEPHONE ENCOUNTER
MONTELUKAST SOD 10 MG TABLET       Last Written Prescription Date:  8-9-23  Last Fill Quantity: 30,   # refills: 3  Last Office Visit : 8-9-23  Future Office visit:  none    Routing refill request to provider for review/approval because:  Med not on derm protocol

## 2023-12-16 RX ORDER — MONTELUKAST SODIUM 10 MG/1
1 TABLET ORAL AT BEDTIME
Qty: 90 TABLET | Refills: 2 | Status: SHIPPED | OUTPATIENT
Start: 2023-12-16 | End: 2024-09-09

## 2023-12-17 NOTE — ANESTHESIA PREPROCEDURE EVALUATION
Anesthesia Pre-Procedure Evaluation    Patient: Dion Thomson   MRN: [id number] : 1970        Procedure : Procedure(s):  STEALTH GUIDED BILATERAL FRONTAL SINUSOTOMY          Past Medical History:   Diagnosis Date    Allergic rhinitis     Anxiety     Chronic sinusitis     Chronic sinusitis     Gastroesophageal reflux disease     Hypercholesteremia     Intermittent asthma 2013    Obstructive sleep apnea     URBANO (obstructive sleep apnea) 2014      Past Surgical History:   Procedure Laterality Date    ARTHROSCOPIC RECONSTRUCTION ANTERIOR CRUCIATE LIGAMENT  1997    Open, Knee Left    ARTHROSCOPY KNEE RT/LT Left 2005    meniscus repair    EXTRACTION(S) DENTAL      OPTICAL TRACKING SYSTEM ENDOSCOPIC SINUS SURGERY Bilateral 2019    Procedure: Image Guided Bilateral Functional Endoscopic Sinus Surgery, Bilateral turbinate reduction;  Surgeon: Willi Garcia MD;  Location: [facility] OR    TONSILLECTOMY & ADENOIDECTOMY  5 yo    VASECTOMY      Vasectomy      No Known Allergies   Social History     Tobacco Use    Smoking status: Never    Smokeless tobacco: Never   Substance Use Topics    Alcohol use: Yes     Alcohol/week: 0.0 - 0.8 standard drinks of alcohol     Comment: Occasional      Wt Readings from Last 1 Encounters:   23 101.1 kg (222 lb 12.8 oz)           Physical Exam    Airway        Mallampati: II   TM distance: > 3 FB   Neck ROM: full   Mouth opening: > 3 cm    Respiratory Devices and Support         Dental       (+) Minor Abnormalities - some fillings, tiny chips      Cardiovascular   cardiovascular exam normal          Pulmonary   pulmonary exam normal                OUTSIDE LABS:  CBC:   Lab Results   Component Value Date    WBC 7.5 2022    WBC 6.9 2021    HGB 15.0 2022    HGB 14.0 2021    HCT 45.4 2022    HCT 39.9 2021     2022     2021     BMP:   Lab Results   Component Value Date     2021     10/06/2020     "POTASSIUM 4.0 07/26/2021    POTASSIUM 4.3 10/06/2020    CHLORIDE 104 07/26/2021    CHLORIDE 101 10/06/2020    BUN 15 07/26/2021    BUN 17 07/26/2021    CR 0.87 07/26/2021    CR 0.91 10/06/2020     (H) 07/26/2021    GLC 95 10/06/2020     COAGS: No results found for: \"PTT\", \"INR\", \"FIBR\"  POC: No results found for: \"BGM\", \"HCG\", \"HCGS\"  HEPATIC:   Lab Results   Component Value Date    ALBUMIN 4.4 08/24/2021    PROTTOTAL 7.0 08/24/2021    ALT 17 08/24/2021    AST 18 08/24/2021    ALKPHOS 57 08/24/2021    BILITOTAL 0.4 08/24/2021    BILIDIRECT 0.09 08/24/2021     OTHER:   Lab Results   Component Value Date    JAMAAL 9.1 07/26/2021       Anesthesia Plan    ASA Status:  2    NPO Status:  NPO Appropriate    Anesthesia Type: General.     - Airway: ETT   Induction: Intravenous, Propofol.   Maintenance: Balanced.        Consents    Anesthesia Plan(s) and associated risks, benefits, and realistic alternatives discussed. Questions answered and patient/representative(s) expressed understanding.     - Discussed:     - Discussed with:  Patient      - Extended Intubation/Ventilatory Support Discussed: No.      - Patient is DNR/DNI Status: No     Use of blood products discussed: No .     Postoperative Care    Pain management: IV analgesics, Oral pain medications, Multi-modal analgesia.   PONV prophylaxis: Ondansetron (or other 5HT-3), Dexamethasone or Solumedrol, Background Propofol Infusion     Comments:               Morgan Roger MD    I have reviewed the pertinent notes and labs in the chart from the past 30 days and (re)examined the patient.  Any updates or changes from those notes are reflected in this note.                "

## 2023-12-18 ENCOUNTER — ANESTHESIA (OUTPATIENT)
Dept: SURGERY | Facility: AMBULATORY SURGERY CENTER | Age: 53
End: 2023-12-18
Payer: COMMERCIAL

## 2023-12-18 ENCOUNTER — HOSPITAL ENCOUNTER (OUTPATIENT)
Facility: AMBULATORY SURGERY CENTER | Age: 53
Discharge: HOME | End: 2023-12-18
Attending: OTOLARYNGOLOGY
Payer: COMMERCIAL

## 2023-12-18 VITALS
DIASTOLIC BLOOD PRESSURE: 80 MMHG | OXYGEN SATURATION: 95 % | TEMPERATURE: 97.7 F | HEIGHT: 69 IN | HEART RATE: 69 BPM | BODY MASS INDEX: 32.58 KG/M2 | WEIGHT: 220 LBS | SYSTOLIC BLOOD PRESSURE: 131 MMHG | RESPIRATION RATE: 16 BRPM

## 2023-12-18 DIAGNOSIS — Z91.09 HOUSE DUST MITE ALLERGY: ICD-10-CM

## 2023-12-18 DIAGNOSIS — J30.1 NON-SEASONAL ALLERGIC RHINITIS DUE TO POLLEN: ICD-10-CM

## 2023-12-18 DIAGNOSIS — J32.1 CHRONIC FRONTAL SINUSITIS: Primary | ICD-10-CM

## 2023-12-18 DIAGNOSIS — J45.21 MILD INTERMITTENT ASTHMA WITH ACUTE EXACERBATION: ICD-10-CM

## 2023-12-18 PROCEDURE — 31276 NSL/SINS NDSC FRNT TISS RMVL: CPT | Mod: 50 | Performed by: OTOLARYNGOLOGY

## 2023-12-18 DEVICE — PROPEL MINI SINUS IMPLANT
Type: IMPLANTABLE DEVICE | Site: NOSE | Status: FUNCTIONAL
Brand: PROPEL MINI

## 2023-12-18 RX ORDER — OXYCODONE HYDROCHLORIDE 5 MG/1
5 TABLET ORAL
Status: COMPLETED | OUTPATIENT
Start: 2023-12-18 | End: 2023-12-18

## 2023-12-18 RX ORDER — DEXAMETHASONE SODIUM PHOSPHATE 10 MG/ML
10 INJECTION, SOLUTION INTRAMUSCULAR; INTRAVENOUS ONCE
Status: COMPLETED | OUTPATIENT
Start: 2023-12-18 | End: 2023-12-18

## 2023-12-18 RX ORDER — OXYCODONE HYDROCHLORIDE 5 MG/1
10 TABLET ORAL
Status: DISCONTINUED | OUTPATIENT
Start: 2023-12-18 | End: 2023-12-19 | Stop reason: HOSPADM

## 2023-12-18 RX ORDER — LIDOCAINE HYDROCHLORIDE AND EPINEPHRINE 10; 10 MG/ML; UG/ML
INJECTION, SOLUTION INFILTRATION; PERINEURAL PRN
Status: DISCONTINUED | OUTPATIENT
Start: 2023-12-18 | End: 2023-12-18 | Stop reason: HOSPADM

## 2023-12-18 RX ORDER — LIDOCAINE HYDROCHLORIDE 20 MG/ML
INJECTION, SOLUTION INFILTRATION; PERINEURAL PRN
Status: DISCONTINUED | OUTPATIENT
Start: 2023-12-18 | End: 2023-12-18

## 2023-12-18 RX ORDER — ONDANSETRON 2 MG/ML
INJECTION INTRAMUSCULAR; INTRAVENOUS PRN
Status: DISCONTINUED | OUTPATIENT
Start: 2023-12-18 | End: 2023-12-18

## 2023-12-18 RX ORDER — PROPOFOL 10 MG/ML
INJECTION, EMULSION INTRAVENOUS CONTINUOUS PRN
Status: DISCONTINUED | OUTPATIENT
Start: 2023-12-18 | End: 2023-12-18

## 2023-12-18 RX ORDER — SODIUM CHLORIDE, SODIUM LACTATE, POTASSIUM CHLORIDE, CALCIUM CHLORIDE 600; 310; 30; 20 MG/100ML; MG/100ML; MG/100ML; MG/100ML
INJECTION, SOLUTION INTRAVENOUS CONTINUOUS
Status: DISCONTINUED | OUTPATIENT
Start: 2023-12-18 | End: 2023-12-18 | Stop reason: HOSPADM

## 2023-12-18 RX ORDER — ONDANSETRON 2 MG/ML
4 INJECTION INTRAMUSCULAR; INTRAVENOUS EVERY 30 MIN PRN
Status: DISCONTINUED | OUTPATIENT
Start: 2023-12-18 | End: 2023-12-19 | Stop reason: HOSPADM

## 2023-12-18 RX ORDER — FENTANYL CITRATE 50 UG/ML
50 INJECTION, SOLUTION INTRAMUSCULAR; INTRAVENOUS EVERY 5 MIN PRN
Status: DISCONTINUED | OUTPATIENT
Start: 2023-12-18 | End: 2023-12-18 | Stop reason: HOSPADM

## 2023-12-18 RX ORDER — LIDOCAINE 40 MG/G
CREAM TOPICAL
Status: DISCONTINUED | OUTPATIENT
Start: 2023-12-18 | End: 2023-12-18 | Stop reason: HOSPADM

## 2023-12-18 RX ORDER — ONDANSETRON 4 MG/1
4 TABLET, ORALLY DISINTEGRATING ORAL
Status: DISCONTINUED | OUTPATIENT
Start: 2023-12-18 | End: 2023-12-19 | Stop reason: HOSPADM

## 2023-12-18 RX ORDER — ECHINACEA PURPUREA EXTRACT 125 MG
2 TABLET ORAL DAILY PRN
Qty: 44 ML | Refills: 11 | Status: SHIPPED | OUTPATIENT
Start: 2023-12-18

## 2023-12-18 RX ORDER — ACETAMINOPHEN 325 MG/1
975 TABLET ORAL ONCE
Status: COMPLETED | OUTPATIENT
Start: 2023-12-18 | End: 2023-12-18

## 2023-12-18 RX ORDER — FENTANYL CITRATE 50 UG/ML
25 INJECTION, SOLUTION INTRAMUSCULAR; INTRAVENOUS
Status: DISCONTINUED | OUTPATIENT
Start: 2023-12-18 | End: 2023-12-19 | Stop reason: HOSPADM

## 2023-12-18 RX ORDER — ONDANSETRON 4 MG/1
4 TABLET, ORALLY DISINTEGRATING ORAL EVERY 30 MIN PRN
Status: DISCONTINUED | OUTPATIENT
Start: 2023-12-18 | End: 2023-12-18 | Stop reason: HOSPADM

## 2023-12-18 RX ORDER — FENTANYL CITRATE 50 UG/ML
25 INJECTION, SOLUTION INTRAMUSCULAR; INTRAVENOUS EVERY 5 MIN PRN
Status: DISCONTINUED | OUTPATIENT
Start: 2023-12-18 | End: 2023-12-18 | Stop reason: HOSPADM

## 2023-12-18 RX ORDER — ONDANSETRON 4 MG/1
4 TABLET, ORALLY DISINTEGRATING ORAL EVERY 30 MIN PRN
Status: DISCONTINUED | OUTPATIENT
Start: 2023-12-18 | End: 2023-12-19 | Stop reason: HOSPADM

## 2023-12-18 RX ORDER — HYDROMORPHONE HYDROCHLORIDE 1 MG/ML
0.4 INJECTION, SOLUTION INTRAMUSCULAR; INTRAVENOUS; SUBCUTANEOUS EVERY 5 MIN PRN
Status: DISCONTINUED | OUTPATIENT
Start: 2023-12-18 | End: 2023-12-18 | Stop reason: HOSPADM

## 2023-12-18 RX ORDER — FENTANYL CITRATE 50 UG/ML
INJECTION, SOLUTION INTRAMUSCULAR; INTRAVENOUS PRN
Status: DISCONTINUED | OUTPATIENT
Start: 2023-12-18 | End: 2023-12-18

## 2023-12-18 RX ORDER — PROPOFOL 10 MG/ML
INJECTION, EMULSION INTRAVENOUS PRN
Status: DISCONTINUED | OUTPATIENT
Start: 2023-12-18 | End: 2023-12-18

## 2023-12-18 RX ORDER — GABAPENTIN 300 MG/1
300 CAPSULE ORAL
Status: COMPLETED | OUTPATIENT
Start: 2023-12-18 | End: 2023-12-18

## 2023-12-18 RX ORDER — ONDANSETRON 2 MG/ML
4 INJECTION INTRAMUSCULAR; INTRAVENOUS EVERY 30 MIN PRN
Status: DISCONTINUED | OUTPATIENT
Start: 2023-12-18 | End: 2023-12-18 | Stop reason: HOSPADM

## 2023-12-18 RX ORDER — OXYCODONE HYDROCHLORIDE 5 MG/1
5-10 TABLET ORAL EVERY 4 HOURS PRN
Qty: 10 TABLET | Refills: 0 | Status: SHIPPED | OUTPATIENT
Start: 2023-12-18

## 2023-12-18 RX ORDER — HYDROMORPHONE HYDROCHLORIDE 1 MG/ML
0.2 INJECTION, SOLUTION INTRAMUSCULAR; INTRAVENOUS; SUBCUTANEOUS EVERY 5 MIN PRN
Status: DISCONTINUED | OUTPATIENT
Start: 2023-12-18 | End: 2023-12-18 | Stop reason: HOSPADM

## 2023-12-18 RX ORDER — OXYCODONE HYDROCHLORIDE 5 MG/1
5 TABLET ORAL
Status: DISCONTINUED | OUTPATIENT
Start: 2023-12-18 | End: 2023-12-19 | Stop reason: HOSPADM

## 2023-12-18 RX ORDER — OXYMETAZOLINE HYDROCHLORIDE 0.05 G/100ML
SPRAY NASAL PRN
Status: DISCONTINUED | OUTPATIENT
Start: 2023-12-18 | End: 2023-12-18 | Stop reason: HOSPADM

## 2023-12-18 RX ADMIN — Medication 50 MG: at 12:25

## 2023-12-18 RX ADMIN — PROPOFOL 250 MG: 10 INJECTION, EMULSION INTRAVENOUS at 12:25

## 2023-12-18 RX ADMIN — PROPOFOL 200 MCG/KG/MIN: 10 INJECTION, EMULSION INTRAVENOUS at 12:24

## 2023-12-18 RX ADMIN — PROPOFOL 150 MCG/KG/MIN: 10 INJECTION, EMULSION INTRAVENOUS at 13:21

## 2023-12-18 RX ADMIN — FENTANYL CITRATE 50 MCG: 50 INJECTION, SOLUTION INTRAMUSCULAR; INTRAVENOUS at 12:19

## 2023-12-18 RX ADMIN — SODIUM CHLORIDE, SODIUM LACTATE, POTASSIUM CHLORIDE, CALCIUM CHLORIDE: 600; 310; 30; 20 INJECTION, SOLUTION INTRAVENOUS at 14:16

## 2023-12-18 RX ADMIN — PROPOFOL 150 MCG/KG/MIN: 10 INJECTION, EMULSION INTRAVENOUS at 12:48

## 2023-12-18 RX ADMIN — GABAPENTIN 300 MG: 300 CAPSULE ORAL at 11:33

## 2023-12-18 RX ADMIN — LIDOCAINE HYDROCHLORIDE 100 MG: 20 INJECTION, SOLUTION INFILTRATION; PERINEURAL at 12:24

## 2023-12-18 RX ADMIN — DEXAMETHASONE SODIUM PHOSPHATE 10 MG: 10 INJECTION, SOLUTION INTRAMUSCULAR; INTRAVENOUS at 12:24

## 2023-12-18 RX ADMIN — ONDANSETRON 4 MG: 2 INJECTION INTRAMUSCULAR; INTRAVENOUS at 13:45

## 2023-12-18 RX ADMIN — SODIUM CHLORIDE, SODIUM LACTATE, POTASSIUM CHLORIDE, CALCIUM CHLORIDE: 600; 310; 30; 20 INJECTION, SOLUTION INTRAVENOUS at 11:33

## 2023-12-18 RX ADMIN — OXYCODONE HYDROCHLORIDE 5 MG: 5 TABLET ORAL at 14:28

## 2023-12-18 RX ADMIN — ACETAMINOPHEN 975 MG: 325 TABLET ORAL at 11:33

## 2023-12-18 RX ADMIN — FENTANYL CITRATE 50 MCG: 50 INJECTION, SOLUTION INTRAMUSCULAR; INTRAVENOUS at 12:24

## 2023-12-18 NOTE — BRIEF OP NOTE
[facility]    Brief Operative Note    Pre-operative diagnosis: Chronic frontal sinusitis [J32.1]  Post-operative diagnosis Same as pre-operative diagnosis    Procedure: STEALTH GUIDED BILATERAL FRONTAL SINUSOTOMY, Bilateral - Nose    Surgeon: Surgeon(s) and Role:     * Willi Garcia MD - Primary     * Alejandro Garcia MD - Resident - Assisting  Anesthesia: General   Estimated Blood Loss: Less than 50 ml    Drains: None  Specimens: * No specimens in log *  Findings:   None.  Complications: None.  Implants:   Implant Name Type Inv. Item Serial No.  Lot No. LRB No. Used Action   IMP SINUS PROPEL MINI MOMETASONE FUORATE 370MCCG 16MM [id number] - [id number] Other IMP SINUS PROPEL MINI MOMETASONE FUORATE 370MCCG 16MM [id number]  [employer] [id number] Left 1 Implanted   IMP SINUS PROPEL MINI MOMETASONE FUORATE 370MCCG 16MM [id number] - [id number] Other IMP SINUS PROPEL MINI MOMETASONE FUORATE 370MCCG 16MM [id number]  [employer] [id number] Right 1 Implanted and Explanted   IMP SINUS PROPEL MINI MOMETASONE FUORATE 370MCCG 16MM [id number] - [id number] Other IMP SINUS PROPEL MINI MOMETASONE FUORATE 370MCCG 16MM [id number]  [employer] [id number] Right 1 Implanted

## 2023-12-18 NOTE — DISCHARGE INSTRUCTIONS
"[facility]  Home Care Following Anesthesia  For 24 hours after surgery:  Get plenty of rest.  A responsible adult must stay with you for at least 24 hours after you leave the surgery center.  Do not drive or use heavy equipment.  If you have weakness or tingling, don't drive or use heavy equipment until this feeling goes away.   Do not drink alcohol.   Avoid strenuous or risky activities.  Ask for help when climbing stairs.  You may feel lightheaded.  IF so, sit for a few minutes before standing.  Have someone help you get up.   If you have nausea (feel sick to your stomach): Drink only clear liquids such as apple juice, ginger ale, broth or 7-Up.  Rest may also help.  Be sure to drink enough fluids.  Move to a regular diet as you feel able.   You may have a slight fever.  Call the doctor if your fever is over 100 F (37.7 C) (taken under the tongue) or lasts longer than 24 hours.  You may have a dry mouth, a sore throat, muscle aches or trouble sleeping. These should go away after 24 hours.  Do not make important or legal decisions.   It is recommended to avoid smoking.        Today you received a Marcaine or bupivacaine block to numb the nerves near your surgery site.  This is a block using local anesthetic or \"numbing\" medication injected around the nerves to anesthetize or \"numb\" the area supplied by those nerves.  This block is injected into the muscle layer near your surgical site.  The medication may numb the location where you had surgery for 6-18 hours, but may last up to 24 hours.  If your surgical site is an arm or leg you should be careful with your affected limb, since it is possible to injure your limb without being aware of it due to the numbing.  Until full feeling returns, you should guard against bumping or hitting your limb, and avoid extreme hot or cold temperatures on the skin.  As the block wears off, the feeling will return as a tingling or prickly " sensation near your surgical site.  You will experience more discomfort from your incision as the feeling returns.  You may want to take a pain pill (a narcotic or Tylenol if this was prescribed by your surgeon) when you start to experience mild pain before the pain beccomes more severe.  If your pain medications do not control your pain you should notifiy your surgeon.    Tips for taking pain medications  To get the best pain relief possible, remember these points:  Take pain medications as directed, before pain becomes severe.  Pain medication can upset your stomach: taking it with food may help.  Constipation is a common side effect of pain medication. Drink plenty of  fluids.  Eat foods high in fiber. Take a stool softener if recommended by your doctor or pharmacist.  Do not drink alcohol, drive or operate machinery while taking pain medications.  Ask about other ways to control pain, such as with heat, ice or relaxation.    Tylenol/Acetaminophen Consumption    If you feel your pain relief is insufficient, you may take Tylenol/Acetaminophen in addition to your narcotic pain medication.   Be careful not to exceed 4,000 mg of Tylenol/Acetaminophen in a 24 hour period from all sources.  If you are taking extra strength Tylenol/acetaminophen (500 mg), the maximum dose is 8 tablets in 24 hours.  If you are taking regular strength acetaminophen (325 mg), the maximum dose is 12 tablets in 24 hours.    Call a doctor for any of the following:  Signs of infection (fever, growing tenderness at the surgery site, a large amount of drainage or bleeding, severe pain, foul-smelling drainage, redness, swelling).  It has been over 8 to 10 hours since surgery and you are still not able to urinate (pass water).  Headache for over 24 hours.  Numbness, tingling or weakness the day after surgery (if you had spinal anesthesia).  Signs of Covid-19 infection (temperature over 100 degrees, shortness of breath, cough, loss of taste/smell,  generalized body aches, persistent headache, chills, sore throat, nausea/vomiting/diarrhea)  Your doctor is:  Dr. Willi Garcia, ENT Otolaryngology: [phone]  Or dial [phone] and ask for the resident on call for:  ENT Otolaryngology  For emergency care, call the:  [facility]:  [phone] (TTY for hearing impaired: [phone])

## 2023-12-18 NOTE — OP NOTE
DATE OF PROCEDURE: 12/18/23    SURGEON: Willi Garcia MD    RESIDENT SURGEON: Alejandro Garcia MD    PRE-OPERATIVE DIAGNOSIS: Chronic frontal sinusitis    POST-OPERATIVE DIAGNOSIS:  Chronic frontal sinusitis    PROCEDURE:   Functional endoscopic sinus surgery with frontal sinusotomy    ANESTHESIA: GETA    INDICATIONS: This is a 53 year old male who was seen in the clinic for chronic frontal sinusitis.  This patient has had functional endoscopic sinus surgery in the past. This patient has failed conservative medical management and is therefore a candidate for the above surgery. The indications, alternatives, risks, and benefits were discussed and all questions were answered.  The patient consented to the above procedure.    FINDINGS: polypoid changes around middle meatus and frontal sinus outflow tract     DESCRIPTION OF PROCEDURE: After informed consent was obtained in the pre-operative area, the patient was brought to the operating room and placed in the supine position.  Following induction, the patient was intubated orotracheally. Monitoring lines were placed as appropriate. The table was then turned 90 degrees in preparation for surgery.   Afrin-soaked pledgets were placed in the bilateral nasal passages.  The image-guidance system was set-up and used throughout the entirety of the case. A time-out was performed to confirm the identity of this patient, the procedure to be done, and the site of surgery. The pledgets were removed and the nose was then visualized endoscopically. Approximately 6 mL of 1% Xylocaine with 1:100,000 epinephrine was injected in the root of the middle turbinate. Afrin-soaked pledgets were placed in the right nasal cavity and we began on the left.  Under endoscopic vision, the shaver was used to remove polypoid tissue obstructing visualization in the middle meatus and nasal cavity medial to the middle turbinate. The frontal sinus seeker was gently used to identified the frontal sinus outflow  tract on image guidance. The imaged guided curved 70 degree suction was then introduced into the outflow tract and gently dilated. A Propel sinus stent was placed into the frontal sinus tract. Next we turned our attention to the right side. Under endoscopic vision, the shaver was used to remove polypoid tissue obstructing visualization in the middle meatus and nasal cavity medial to the middle turbinate. The frontal sinus seeker was gently used to identified the frontal sinus outflow tract on image guidance. The imaged guided curved 70 degree suction was then introduced into the outflow tract and gently dilated. A Propel sinus stent was placed into the frontal sinus tract. This stent appeared displaced inferiorly and was removed.  A Propel sinus stent was then placed in the frontal sinus tract and confirmed to be in good position. The bilateral nasal passages were irrigated, suctioned, and surgiflo was placed in the middle meati. An orogastric tube was passed to suction out the stomach and oropharynx.    Dr. Garcia was present for all portions of this procedure.    lAejandro Garcia MD  Otolaryngology Head and Neck Surgery Resident

## 2023-12-18 NOTE — ANESTHESIA PROCEDURE NOTES
Airway       Patient location during procedure: OR       Procedure Start/Stop Times: 12/18/2023 12:27 PM  Staff -        Anesthesiologist:  Morgan Roger MD       CRNA: Riana Dash APRN CRNA       Performed By: CRNA  Consent for Airway        Urgency: elective  Indications and Patient Condition       Indications for airway management: nydia-procedural       Induction type:intravenous       Mask difficulty assessment: 1 - vent by mask    Final Airway Details       Final airway type: endotracheal airway       Successful airway: Oral and AVERY  Endotracheal Airway Details        ETT size (mm): 7.5       Cuffed: yes       Successful intubation technique: direct laryngoscopy       DL Blade Type: [place] 2       Grade View of Cords: 1       Adjucts: stylet       Position: Left       Measured from: lips       Secured at (cm): 22       Bite block used: Soft    Post intubation assessment        Placement verified by: capnometry, equal breath sounds and chest rise        Number of attempts at approach: 1       Number of other approaches attempted: 0       Secured with: tape       Ease of procedure: easy       Dentition: Intact and Unchanged    Medication(s) Administered   Medication Administration Time: 12/18/2023 12:27 PM

## 2023-12-18 NOTE — ANESTHESIA POSTPROCEDURE EVALUATION
Patient: Dion Thomson    Procedure: Procedure(s):  STEALTH GUIDED BILATERAL FRONTAL SINUSOTOMY       Anesthesia Type:  General    Note:  Disposition: Outpatient   Postop Pain Control: Uneventful            Sign Out: Well controlled pain   PONV: No   Neuro/Psych: Uneventful            Sign Out: Acceptable/Baseline neuro status   Airway/Respiratory: Uneventful            Sign Out: Acceptable/Baseline resp. status   CV/Hemodynamics: Uneventful            Sign Out: Acceptable CV status; No obvious hypovolemia; No obvious fluid overload   Other NRE:    DID A NON-ROUTINE EVENT OCCUR?            Last vitals:  Vitals Value Taken Time   /79 12/18/23 1430   Temp 36.6  C (97.9  F) 12/18/23 1430   Pulse 69 12/18/23 1431   Resp 10 12/18/23 1431   SpO2 96 % 12/18/23 1431   Vitals shown include unfiled device data.    Electronically Signed By: Morgan Roger MD  December 18, 2023  2:32 PM

## 2023-12-18 NOTE — INTERVAL H&P NOTE
"I have reviewed the surgical (or preoperative) H&P that is linked to this encounter, and examined the patient. There are no significant changes    Clinical Conditions Present on Arrival:  Clinically Significant Risk Factors Present on Admission                  # Obesity: Estimated body mass index is 32.49 kg/m  as calculated from the following:    Height as of this encounter: 1.753 m (5' 9\").    Weight as of this encounter: 99.8 kg (220 lb).       "

## 2023-12-18 NOTE — ANESTHESIA CARE TRANSFER NOTE
Patient: Dion Thomson    Procedure: Procedure(s):  STEALTH GUIDED BILATERAL FRONTAL SINUSOTOMY       Diagnosis: Chronic frontal sinusitis [J32.1]  Diagnosis Additional Information: No value filed.    Anesthesia Type:   General     Note:    Oropharynx: oropharynx clear of all foreign objects  Level of Consciousness: awake  Oxygen Supplementation: face mask    Independent Airway: airway patency satisfactory and stable  Dentition: dentition unchanged  Vital Signs Stable: post-procedure vital signs reviewed and stable    Patient transferred to: PACU    Handoff Report: Identifed the Patient, Identified the Reponsible Provider, Reviewed the pertinent medical history, Discussed the surgical course, Reviewed Intra-OP anesthesia mangement and issues during anesthesia, Set expectations for post-procedure period and Allowed opportunity for questions and acknowledgement of understanding      Vitals:  Vitals Value Taken Time   /89 12/18/23 1418   Temp 36.2  C (97.2  F) 12/18/23 1418   Pulse 69 12/18/23 1420   Resp 13 12/18/23 1420   SpO2 97 % 12/18/23 1420   Vitals shown include unfiled device data.    Electronically Signed By: ENMA Morrison CRNA  December 18, 2023  2:21 PM

## 2023-12-19 RX ORDER — FEXOFENADINE HCL 180 MG/1
180 TABLET ORAL AT BEDTIME
Qty: 30 TABLET | Refills: 3 | Status: SHIPPED | OUTPATIENT
Start: 2023-12-19 | End: 2024-04-22

## 2023-12-19 NOTE — TELEPHONE ENCOUNTER
8/9/2023  [facility] [place]     James Gomez MD  Dermatology   Rf process #1

## 2024-01-08 ENCOUNTER — OFFICE VISIT (OUTPATIENT)
Dept: OTOLARYNGOLOGY | Facility: CLINIC | Age: 54
End: 2024-01-08
Payer: COMMERCIAL

## 2024-01-08 ENCOUNTER — TELEPHONE (OUTPATIENT)
Dept: OTOLARYNGOLOGY | Facility: CLINIC | Age: 54
End: 2024-01-08

## 2024-01-08 VITALS
WEIGHT: 220.4 LBS | SYSTOLIC BLOOD PRESSURE: 117 MMHG | BODY MASS INDEX: 32.64 KG/M2 | HEART RATE: 78 BPM | DIASTOLIC BLOOD PRESSURE: 82 MMHG | HEIGHT: 69 IN

## 2024-01-08 DIAGNOSIS — Z98.890 POSTOPERATIVE STATE: Primary | ICD-10-CM

## 2024-01-08 PROCEDURE — 99024 POSTOP FOLLOW-UP VISIT: CPT | Performed by: OTOLARYNGOLOGY

## 2024-01-08 ASSESSMENT — PAIN SCALES - GENERAL: PAINLEVEL: NO PAIN (0)

## 2024-01-08 NOTE — PATIENT INSTRUCTIONS
You were seen in the ENT Clinic today by Dr. Garcia. If you have any questions or concerns after your appointment, please contact us (see below)     2.   Please return to the clinic in 2-3 weeks.              How to Contact Us:  Send a [employer] message to your provider. Our team will respond to you via [employer]. Occasionally, we will need to call you to get further information.  For urgent matters (Monday-Friday), call the ENT Clinic: [phone] and speak with a call center team member - they will route your call appropriately.   If you'd like to speak directly with a nurse, please find our contact information below. We do our best to check voicemail frequently throughout the day, and will work to call you back within 1-2 days. For urgent matters, please use the general clinic phone numbers listed above.        Lynne MARTÍNEZ RN  ENT RN Care Coordinator  Direct: [phone]  Candida TREADWELL LPN  Direct: [phone]           [facility]  Department of Otolaryngology

## 2024-01-08 NOTE — PROGRESS NOTES
HISTORY OF PRESENT ILLNESS:   Dion Thomson is a 53 year old year old male who presents today for s/p stealth guided bilateral frontal sinusotomy (12/18/23). Today he reports that he had mild facial pressure and nasal congestion that started last week. Otherwise, he is doing well and makes no other complaints.     PHYSICAL EXAMINATION:  In no acute distress. Normal mood, normal affect, alert, and appropriate. Head is normocephalic. Cranial nerve VII is House-Backmann I out of VI bilaterally. Breathing without difficulty or stridor. Eyes are anicteric.     Some right nasal edema.  No purulence    ASSESSMENT/PLAN:   Dion Thomson is a 53 year old year old male who presents today for s/p stealth guided bilateral frontal sinusotomy (12/18/23). Doing well, continue budesonide irrigations    FOLLOW UP: 2-3 weeks    Scribe Disclosure:   I, Joshua Steinberg, am serving as a scribe; to document services personally performed by Willi Garcia MD -based on data collection and the provider's statements to me.     Provider Disclosure:  I agree with above History, Review of Systems, Physical exam and Plan.  I have reviewed the content of the documentation and have edited it as needed. I have personally performed the services documented here and the documentation accurately represents those services and the decisions I have made.      Electronically signed by:  Willi Garcia MD

## 2024-01-08 NOTE — LETTER
1/8/2024       RE: Dion Thomson  [address] [place] [address]     Dear Colleague,    Thank you for referring your patient, Dion Thomson, to the [facility] EAR NOSE AND THROAT CLINIC [place] at [facility]. Please see a copy of my visit note below.    HISTORY OF PRESENT ILLNESS:   Dion Thomson is a 53 year old year old male who presents today for s/p stealth guided bilateral frontal sinusotomy (12/18/23). Today he reports that he had mild facial pressure and nasal congestion that started last week. Otherwise, he is doing well and makes no other complaints.     PHYSICAL EXAMINATION:  In no acute distress. Normal mood, normal affect, alert, and appropriate. Head is normocephalic. Cranial nerve VII is House-Backmann I out of VI bilaterally. Breathing without difficulty or stridor. Eyes are anicteric.     Some right nasal edema.  No purulence    ASSESSMENT/PLAN:   Dion Thomson is a 53 year old year old male who presents today for s/p stealth guided bilateral frontal sinusotomy (12/18/23). Doing well, continue budesonide irrigations    FOLLOW UP: 2-3 weeks    Scribe Disclosure:   I, Joshua Steinberg, am serving as a scribe; to document services personally performed by Willi Garcia MD -based on data collection and the provider's statements to me.     Provider Disclosure:  I agree with above History, Review of Systems, Physical exam and Plan.  I have reviewed the content of the documentation and have edited it as needed. I have personally performed the services documented here and the documentation accurately represents those services and the decisions I have made.      Electronically signed by:  Willi Garcia MD        Again, thank you for allowing me to participate in the care of your patient.      Sincerely,    Willi Garcia MD

## 2024-01-08 NOTE — NURSING NOTE
"Chief Complaint   Patient presents with    RECHECK   Blood pressure 117/82, pulse 78, height 1.753 m (5' 9\"), weight 100 kg (220 lb 6.4 oz). Alec Thomas, EMT    "

## 2024-01-22 ENCOUNTER — TELEPHONE (OUTPATIENT)
Dept: OTOLARYNGOLOGY | Facility: CLINIC | Age: 54
End: 2024-01-22

## 2024-01-22 ENCOUNTER — OFFICE VISIT (OUTPATIENT)
Dept: OTOLARYNGOLOGY | Facility: CLINIC | Age: 54
End: 2024-01-22
Payer: COMMERCIAL

## 2024-01-22 VITALS — BODY MASS INDEX: 31.84 KG/M2 | WEIGHT: 215 LBS | HEIGHT: 69 IN

## 2024-01-22 DIAGNOSIS — R09.81 NASAL CONGESTION: Primary | ICD-10-CM

## 2024-01-22 PROCEDURE — 99212 OFFICE O/P EST SF 10 MIN: CPT | Performed by: OTOLARYNGOLOGY

## 2024-01-22 RX ORDER — PREDNISONE 20 MG/1
TABLET ORAL
Qty: 19 TABLET | Refills: 0 | Status: SHIPPED | OUTPATIENT
Start: 2024-01-22 | End: 2024-02-01

## 2024-01-22 ASSESSMENT — PAIN SCALES - GENERAL: PAINLEVEL: NO PAIN (0)

## 2024-01-22 NOTE — NURSING NOTE
"Chief Complaint   Patient presents with    RECHECK   Height 1.753 m (5' 9\"), weight 97.5 kg (215 lb). Alec Thomas, EMT    "

## 2024-01-22 NOTE — PATIENT INSTRUCTIONS
You were seen in the ENT Clinic today by Dr. Garcia. If you have any questions or concerns after your appointment, please contact us (see below)     2.   Please return to the clinic in 3 weeks.              How to Contact Us:  Send a [employer] message to your provider. Our team will respond to you via [employer]. Occasionally, we will need to call you to get further information.  For urgent matters (Monday-Friday), call the ENT Clinic: [phone] and speak with a call center team member - they will route your call appropriately.   If you'd like to speak directly with a nurse, please find our contact information below. We do our best to check voicemail frequently throughout the day, and will work to call you back within 1-2 days. For urgent matters, please use the general clinic phone numbers listed above.        Lynne MARTÍNEZ RN  ENT RN Care Coordinator  Direct: [phone]  Candida TREADWELL LPN  Direct: [phone]           [facility]  Department of Otolaryngology

## 2024-01-22 NOTE — LETTER
1/22/2024       RE: Dion Thomson  [address] [place] [address]     Dear Colleague,    Thank you for referring your patient, Dino Thomson, to the [facility] EAR NOSE AND THROAT CLINIC [place] at [facility]. Please see a copy of my visit note below.    The patient is back to see us again today.  He continues to have some symptoms of frontal pressure and congestion.  Things are slowly getting better.  Exam today does show some persistent inflammation, particularly on the right side.  Will do a course of prednisone and see him back in a couple weeks.      Willi Garcia MD

## 2024-01-29 NOTE — PROGRESS NOTES
The patient is back to see us again today.  He continues to have some symptoms of frontal pressure and congestion.  Things are slowly getting better.  Exam today does show some persistent inflammation, particularly on the right side.  Will do a course of prednisone and see him back in a couple weeks.      Willi Garcia MD

## 2024-02-04 ENCOUNTER — HEALTH MAINTENANCE LETTER (OUTPATIENT)
Age: 54
End: 2024-02-04

## 2024-02-08 ENCOUNTER — TELEPHONE (OUTPATIENT)
Dept: OTOLARYNGOLOGY | Facility: CLINIC | Age: 54
End: 2024-02-08
Payer: COMMERCIAL

## 2024-03-25 ENCOUNTER — OFFICE VISIT (OUTPATIENT)
Dept: OTOLARYNGOLOGY | Facility: CLINIC | Age: 54
End: 2024-03-25
Payer: COMMERCIAL

## 2024-03-25 VITALS
DIASTOLIC BLOOD PRESSURE: 81 MMHG | BODY MASS INDEX: 33.5 KG/M2 | HEART RATE: 90 BPM | HEIGHT: 69 IN | SYSTOLIC BLOOD PRESSURE: 134 MMHG | OXYGEN SATURATION: 96 % | WEIGHT: 226.2 LBS

## 2024-03-25 DIAGNOSIS — J32.1 CHRONIC FRONTAL SINUSITIS: Primary | ICD-10-CM

## 2024-03-25 PROCEDURE — 31231 NASAL ENDOSCOPY DX: CPT | Performed by: OTOLARYNGOLOGY

## 2024-03-25 ASSESSMENT — PAIN SCALES - GENERAL: PAINLEVEL: NO PAIN (0)

## 2024-03-25 NOTE — NURSING NOTE
"Chief Complaint   Patient presents with    RECHECK   Blood pressure 134/81, pulse 90, height 1.753 m (5' 9\"), weight 102.6 kg (226 lb 3.2 oz), SpO2 96%. Alec Thomas, EMT    "

## 2024-03-25 NOTE — PROGRESS NOTES
HISTORY OF PRESENT ILLNESS:   Dion Thomson is a 53 year old year old male who presents today for follow up.  He continues to intermittently get some nasal congestion.  Generally does not have discolored nasal drainage.  Facial pressure has improved.  Has been trying to do the budesonide.    PHYSICAL EXAMINATION:  In no acute distress. Normal mood, normal affect, alert, and appropriate. Head is normocephalic. Cranial nerve VII is House-Backmann I out of VI bilaterally. Breathing without difficulty or stridor. Eyes are anicteric.     Nose is sprayed with lidocaine and after verbal consent nasal endoscopy is performed.  Both sides of the nose show some turbinate hypertrophy but patent middle meatal eye bilaterally without polyps or purulence.    ASSESSMENT/PLAN:   Dion Thomson is a 53 year old year old male who presents today for follow up on his most recent surgical intervention.  At this point things are improving appropriately.  Will have him start doing fluticasone nasal spray routinely and follow-up in 3 months.      Scribe Disclosure:   I, Joshua Steinberg, am serving as a scribe; to document services personally performed by Willi Garcia MD -based on data collection and the provider's statements to me.     Provider Disclosure:  I agree with above History, Review of Systems, Physical exam and Plan.  I have reviewed the content of the documentation and have edited it as needed. I have personally performed the services documented here and the documentation accurately represents those services and the decisions I have made.      Electronically signed by:  Willi Garcia MD

## 2024-03-25 NOTE — PATIENT INSTRUCTIONS
You were seen in the ENT Clinic today by Dr. Garcia. If you have any questions or concerns after your appointment, please contact us (see below)     2.   Please return to the clinic               How to Contact Us:  Send a [email or web address] message to your provider. Our team will respond to you via [email or web address]. Occasionally, we will need to call you to get further information.  For urgent matters (Monday-Friday), call the ENT Clinic: [phone] and speak with a call center team member - they will route your call appropriately.   If you'd like to speak directly with a nurse, please find our contact information below. We do our best to check voicemail frequently throughout the day, and will work to call you back within 1-2 days. For urgent matters, please use the general clinic phone numbers listed above.        Lynne MARTÍNEZ RN  ENT RN Care Coordinator  Direct: [phone]  Candida TREADWELL LPN  Direct: [phone]           [facility]  Department of Otolaryngology

## 2024-03-25 NOTE — LETTER
3/25/2024       RE: Dion Thomson  [address]  [place] [address]     Dear Colleague,    Thank you for referring your patient, Dion Thomson, to the [facility] E[facility] NOSE AND THROAT CLINIC [place] at [facility]. Please see a copy of my visit note below.    HISTORY OF PRESENT ILLNESS:   Dion Thomson is a 53 year old year old male who presents today for follow up.  He continues to intermittently get some nasal congestion.  Generally does not have discolored nasal drainage.  Facial pressure has improved.  Has been trying to do the budesonide.    PHYSICAL EXAMINATION:  In no acute distress. Normal mood, normal affect, alert, and appropriate. Head is normocephalic. Cranial nerve VII is House-Backmann I out of VI bilaterally. Breathing without difficulty or stridor. Eyes are anicteric.     Nose is sprayed with lidocaine and after verbal consent nasal endoscopy is performed.  Both sides of the nose show some turbinate hypertrophy but patent middle meatal eye bilaterally without polyps or purulence.    ASSESSMENT/PLAN:   Dion Thomson is a 53 year old year old male who presents today for follow up on his most recent surgical intervention.  At this point things are improving appropriately.  Will have him start doing fluticasone nasal spray routinely and follow-up in 3 months.      Scribe Disclosure:   I, Joshua Steinberg, am serving as a scribe; to document services personally performed by Willi Garcia MD -based on data collection and the provider's statements to me.     Provider Disclosure:  I agree with above History, Review of Systems, Physical exam and Plan.  I have reviewed the content of the documentation and have edited it as needed. I have personally performed the services documented here and the documentation accurately represents those services and the decisions I have made.      Electronically signed by:  Willi Garcia MD

## 2024-03-30 DIAGNOSIS — K21.9 GASTROESOPHAGEAL REFLUX DISEASE WITHOUT ESOPHAGITIS: ICD-10-CM

## 2024-04-12 ENCOUNTER — TELEPHONE (OUTPATIENT)
Dept: OTOLARYNGOLOGY | Facility: CLINIC | Age: 54
End: 2024-04-12
Payer: COMMERCIAL

## 2024-04-13 DIAGNOSIS — J45.21 MILD INTERMITTENT ASTHMA WITH ACUTE EXACERBATION: ICD-10-CM

## 2024-04-13 DIAGNOSIS — Z91.09 HOUSE DUST MITE ALLERGY: ICD-10-CM

## 2024-04-13 DIAGNOSIS — J30.1 NON-SEASONAL ALLERGIC RHINITIS DUE TO POLLEN: ICD-10-CM

## 2024-04-22 RX ORDER — FEXOFENADINE HCL 180 MG/1
180 TABLET ORAL AT BEDTIME
Qty: 30 TABLET | Refills: 3 | Status: SHIPPED | OUTPATIENT
Start: 2024-04-22

## 2024-04-22 NOTE — TELEPHONE ENCOUNTER
Last Clinic Visit: 8/9/2023 [facility] [place]    fexofenadine (ALLEGRA) 180 MG tablet: Refilled qty to 12 months from last visit (process #1/Derm protocol).

## 2024-04-28 DIAGNOSIS — J45.40 MODERATE PERSISTENT REACTIVE AIRWAY DISEASE WITHOUT COMPLICATION: ICD-10-CM

## 2024-04-29 DIAGNOSIS — J45.20 MILD INTERMITTENT ASTHMA WITHOUT COMPLICATION: ICD-10-CM

## 2024-04-30 RX ORDER — FLUTICASONE PROPIONATE AND SALMETEROL 113; 14 UG/1; UG/1
1 POWDER, METERED RESPIRATORY (INHALATION) 2 TIMES DAILY
Qty: 1 EACH | Refills: 11 | Status: SHIPPED | OUTPATIENT
Start: 2024-04-30

## 2024-05-07 NOTE — TELEPHONE ENCOUNTER
"   BUDESONIDE 0.25 MG/2 ML SUSP    MIX 4 MLS (0.5 MG) WITH 10MLS OF NORMAL SALINE IN LARGE SYRINGE, INSTILL HALF OF MIXTURE INTO BOTH NOSTRILS THREE TIMES A DAY.   Last Written Prescription Date:  4/11/23  Last Fill Quantity: 240 ml ,   # refills: 11  Last Office Visit : 3/25/24  Future Office visit:  6/24/24 [facility]    Routing refill request to provider for review/approval because:   BUDESONIDE 0.25 MG/2 ML SUSP not on the ENT [facility], [facility] or [facility] refill protocol   3/25/24 note>\" Dion Thomson is a 53 year old year old male who presents today for follow up on his most recent surgical intervention.  At this point things are improving appropriately.  Will have him start doing fluticasone nasal spray routinely and follow-up in 3 month\"     "

## 2024-05-15 RX ORDER — BUDESONIDE 0.25 MG/2ML
INHALANT ORAL
Qty: 120 ML | Refills: 23 | OUTPATIENT
Start: 2024-05-15

## 2024-05-22 DIAGNOSIS — J45.20 MILD INTERMITTENT ASTHMA WITHOUT COMPLICATION: ICD-10-CM

## 2024-05-23 RX ORDER — ALBUTEROL SULFATE 90 UG/1
AEROSOL, METERED RESPIRATORY (INHALATION)
Qty: 18 G | Refills: 11 | Status: SHIPPED | OUTPATIENT
Start: 2024-05-23

## 2024-06-24 ENCOUNTER — OFFICE VISIT (OUTPATIENT)
Dept: OTOLARYNGOLOGY | Facility: CLINIC | Age: 54
End: 2024-06-24
Payer: COMMERCIAL

## 2024-06-24 VITALS
BODY MASS INDEX: 33.15 KG/M2 | WEIGHT: 223.8 LBS | HEART RATE: 85 BPM | DIASTOLIC BLOOD PRESSURE: 76 MMHG | HEIGHT: 69 IN | SYSTOLIC BLOOD PRESSURE: 129 MMHG | OXYGEN SATURATION: 95 %

## 2024-06-24 DIAGNOSIS — J33.9 NASAL POLYPOSIS: Primary | ICD-10-CM

## 2024-06-24 DIAGNOSIS — R06.2 WHEEZING: ICD-10-CM

## 2024-06-24 PROCEDURE — 99213 OFFICE O/P EST LOW 20 MIN: CPT | Performed by: OTOLARYNGOLOGY

## 2024-06-24 RX ORDER — PREDNISONE 20 MG/1
40 TABLET ORAL DAILY
Qty: 10 TABLET | Refills: 0 | Status: SHIPPED | OUTPATIENT
Start: 2024-06-24

## 2024-06-24 ASSESSMENT — PAIN SCALES - GENERAL: PAINLEVEL: NO PAIN (0)

## 2024-06-24 NOTE — NURSING NOTE
"Chief Complaint   Patient presents with    RECHECK   Blood pressure 129/76, pulse 85, height 1.753 m (5' 9\"), weight 101.5 kg (223 lb 12.8 oz), SpO2 95%. Alec Thomas, EMT    "

## 2024-06-24 NOTE — LETTER
6/24/2024       RE: Dion Thomson  [address]  [place] [address]     Dear Colleague,    Thank you for referring your patient, Dion Thomson, to the [facility] EAR NOSE AND THROAT CLINIC [place] at [facility]. Please see a copy of my visit note below.    HISTORY OF PRESENT ILLNESS:   Dion Thomson is a 53 year old year old male who presents today for follow up. He states the procedure he had in December was helpful. His nasal passages are open. However, he notes that his sense of taste/smell has diminished over the past couple of days. He denies feeling like he has a sinus infection. Has previously trialed Dupixent which was not helpful. Has been off his nasal medications for the past week.     PHYSICAL EXAMINATION:  In no acute distress. Normal mood, normal affect, alert, and appropriate. Head is normocephalic. Cranial nerve VII is House-Backmann I out of VI bilaterally. Breathing without difficulty or stridor. Eyes are anicteric.   No significant purulence in the nose.    ASSESSMENT/PLAN:   Dion Thomson is a 53 year old year old male who presents today for dysgeusia and anosmia. He underwent frontal sinusotomy in December of last year. He is feeling fine from the procedure but his sense of taste and smell have diminished. I suggested he use Budesonide rinses until smell returns and then switching back to fluticasone.    FOLLOW UP: 3 months    Scribe Disclosure:   I, Joshua Steinberg, am serving as a scribe; to document services personally performed by Willi Garcia MD -based on data collection and the provider's statements to me.     Provider Disclosure:  I agree with above History, Review of Systems, Physical exam and Plan.  I have reviewed the content of the documentation and have edited it as needed. I have personally performed the services documented here and the documentation accurately represents those services and the decisions I have made.       Electronically signed by:  Willi Garcia MD        Again, thank you for allowing me to participate in the care of your patient.      Sincerely,    Willi Garcia MD

## 2024-06-24 NOTE — PROGRESS NOTES
HISTORY OF PRESENT ILLNESS:   Dion Thomson is a 53 year old year old male who presents today for follow up. He states the procedure he had in December was helpful. His nasal passages are open. However, he notes that his sense of taste/smell has diminished over the past couple of days. He denies feeling like he has a sinus infection. Has previously trialed Dupixent which was not helpful. Has been off his nasal medications for the past week.     PHYSICAL EXAMINATION:  In no acute distress. Normal mood, normal affect, alert, and appropriate. Head is normocephalic. Cranial nerve VII is House-Backmann I out of VI bilaterally. Breathing without difficulty or stridor. Eyes are anicteric.   No significant purulence in the nose.    ASSESSMENT/PLAN:   Dion Thomson is a 53 year old year old male who presents today for dysgeusia and anosmia. He underwent frontal sinusotomy in December of last year. He is feeling fine from the procedure but his sense of taste and smell have diminished. I suggested he use Budesonide rinses until smell returns and then switching back to fluticasone.    FOLLOW UP: 3 months    Scribe Disclosure:   I, Joshua Steinberg, am serving as a scribe; to document services personally performed by Willi Garcia MD -based on data collection and the provider's statements to me.     Provider Disclosure:  I agree with above History, Review of Systems, Physical exam and Plan.  I have reviewed the content of the documentation and have edited it as needed. I have personally performed the services documented here and the documentation accurately represents those services and the decisions I have made.      Electronically signed by:  Willi Garcia MD

## 2024-08-07 DIAGNOSIS — K21.9 GASTROESOPHAGEAL REFLUX DISEASE WITHOUT ESOPHAGITIS: ICD-10-CM

## 2024-08-07 NOTE — TELEPHONE ENCOUNTER
Med: omeprazole    LOV (related): 11/28/23      Due for F/U around:  due for CPX  Last CPX 7/26/21      Next Appt: No current future appointments scheduled

## 2024-09-04 DIAGNOSIS — J30.1 NON-SEASONAL ALLERGIC RHINITIS DUE TO POLLEN: ICD-10-CM

## 2024-09-04 DIAGNOSIS — Z91.09 HOUSE DUST MITE ALLERGY: ICD-10-CM

## 2024-09-04 DIAGNOSIS — J45.21 MILD INTERMITTENT ASTHMA WITH ACUTE EXACERBATION: ICD-10-CM

## 2024-09-09 RX ORDER — MONTELUKAST SODIUM 10 MG/1
1 TABLET ORAL AT BEDTIME
Qty: 30 TABLET | Refills: 8 | Status: SHIPPED | OUTPATIENT
Start: 2024-09-09

## 2024-09-09 NOTE — TELEPHONE ENCOUNTER
montelukast (SINGULAIR) 10 MG tablet 90 tablet 2 12/16/2023     Last Office Visit: 8/9/23  Future Office visit:   none  Leukotriene Inhibitors Protocol [id number]/04/2024 12:37 AM   Protocol Details Asthma control assessment score within normal limits in last 6 months    Recent (6 mo) or future (90 days) visit within the authorizing provider's specialty   ACT TOTAL SCORE 2/7/23--15    Routing refill request to provider for review/approval because:  Overdue for appt per protocol  ACT score.     Kathie Sams, RN  P Central Nursing/Red Flag Triage & Med Refill Team

## 2024-11-03 DIAGNOSIS — J45.21 MILD INTERMITTENT ASTHMA WITH ACUTE EXACERBATION: ICD-10-CM

## 2024-11-03 DIAGNOSIS — Z91.09 HOUSE DUST MITE ALLERGY: ICD-10-CM

## 2024-11-03 DIAGNOSIS — J30.1 NON-SEASONAL ALLERGIC RHINITIS DUE TO POLLEN: ICD-10-CM

## 2024-11-06 RX ORDER — FEXOFENADINE HCL 180 MG/1
180 TABLET ORAL AT BEDTIME
Qty: 30 TABLET | Refills: 3 | OUTPATIENT
Start: 2024-11-06

## 2024-11-06 NOTE — TELEPHONE ENCOUNTER
pt needs appt, pt of Dr. Gomez  LCV: 8-9-23  Derm protocol 1    FYI to scheduling  last clinic note: 8-9-23  Follow-up in Derm-Allergy clinic in few months to discuss further treatement

## 2024-11-07 ENCOUNTER — TELEPHONE (OUTPATIENT)
Dept: DERMATOLOGY | Facility: CLINIC | Age: 54
End: 2024-11-07
Payer: COMMERCIAL

## 2024-11-07 NOTE — TELEPHONE ENCOUNTER
LVM to schedule a return visit with Dr. Gomez in order to get an update on medications as patient' refill was denied until patient is seen again. Dr MARTÍNEZ is out of office for a new months so patient may want to see [facility] in meantime and see if they can order meds    Gave Allergy number

## 2025-01-20 ENCOUNTER — MYC MEDICAL ADVICE (OUTPATIENT)
Dept: FAMILY MEDICINE | Facility: CLINIC | Age: 55
End: 2025-01-20

## 2025-01-20 DIAGNOSIS — K21.9 GASTROESOPHAGEAL REFLUX DISEASE WITHOUT ESOPHAGITIS: ICD-10-CM

## 2025-01-20 DIAGNOSIS — E78.00 HYPERCHOLESTEREMIA: ICD-10-CM

## 2025-01-20 RX ORDER — ATORVASTATIN CALCIUM 10 MG/1
10 TABLET, FILM COATED ORAL DAILY
Qty: 30 TABLET | Refills: 1 | Status: SHIPPED | OUTPATIENT
Start: 2025-01-20

## 2025-02-02 ENCOUNTER — OFFICE VISIT (OUTPATIENT)
Dept: URGENT CARE | Facility: URGENT CARE | Age: 55
End: 2025-02-02
Payer: COMMERCIAL

## 2025-02-02 ENCOUNTER — HOSPITAL ENCOUNTER (OUTPATIENT)
Dept: GENERAL RADIOLOGY | Facility: HOSPITAL | Age: 55
Discharge: HOME | End: 2025-02-02
Attending: FAMILY MEDICINE | Admitting: FAMILY MEDICINE
Payer: COMMERCIAL

## 2025-02-02 VITALS
RESPIRATION RATE: 16 BRPM | OXYGEN SATURATION: 97 % | TEMPERATURE: 98 F | SYSTOLIC BLOOD PRESSURE: 119 MMHG | DIASTOLIC BLOOD PRESSURE: 76 MMHG | HEART RATE: 76 BPM

## 2025-02-02 DIAGNOSIS — R05.1 ACUTE COUGH: ICD-10-CM

## 2025-02-02 DIAGNOSIS — R05.1 ACUTE COUGH: Primary | ICD-10-CM

## 2025-02-02 PROCEDURE — 71046 X-RAY EXAM CHEST 2 VIEWS: CPT

## 2025-02-02 PROCEDURE — 99214 OFFICE O/P EST MOD 30 MIN: CPT | Performed by: FAMILY MEDICINE

## 2025-02-02 RX ORDER — PREDNISONE 20 MG/1
40 TABLET ORAL DAILY
Qty: 10 TABLET | Refills: 0 | Status: SHIPPED | OUTPATIENT
Start: 2025-02-02 | End: 2025-02-07

## 2025-02-02 RX ORDER — DOXYCYCLINE 100 MG/1
100 CAPSULE ORAL 2 TIMES DAILY
Qty: 28 CAPSULE | Refills: 0 | Status: SHIPPED | OUTPATIENT
Start: 2025-02-02 | End: 2025-02-16

## 2025-02-02 ASSESSMENT — ENCOUNTER SYMPTOMS
SINUS PRESSURE: 1
COUGH: 1
SINUS PAIN: 1

## 2025-02-02 NOTE — PATIENT INSTRUCTIONS
Discharge instructions from Dr. Caban:    Today you were seen for:  ongoing cough    The Plan for you to get better is :  Doxy and prednisone     What do I do if this does not change or fails to improve? :    I anticipate you will improve in 5 to 7 days. If you fail to improve or worsen please be reseen by your primary care physician or clinician, or urgent care.  If you are worse please go to the emergency room.       What to do if I am really worse? :    If you feel you are woserning with life threatening symptoms such as: a very fast heart rate difficulty breathing, increasing confusion, uncontrolled pain, the inability to keep any solids or liquids down, a failure to urinate or other abnormal and worsening symptoms go to an emergency room immediately.        Where are my test results if they were not back when I was discharged? :      Test results done in Urgent Care are released automatically as soon as they are resulted via [place]. There are some instances when we call you with positive same day results (strep, covid,urine culture) They may also be mailed to you if you do not have my chart.      If you are waiting on a test for strep throat and it is positive medication will be called into the pharmacy of record.        FYI if you were prescribed narcotics:    If you have been prescribed any narcotic pain medication in the urgent care these medications are not refilled by Urgent Care and you should see her primary care physician for any refills.  Of note any narcotic pain medication has the potential to become habit-forming and can impact a person sobriety and recovery. If they were prescribed for medically appropriate reason and are causing these unintended effects please discuss with your primary care physician immediately.    Your health is important to all of us here at [place] and the Urgent Care and we appreciate your trust and partnership in your return to health here today.

## 2025-02-02 NOTE — PROGRESS NOTES
Patient presents with:  Cough: Cough x 2 days  Pt has been using inhaler and is requesting a chest x-ray       Assessment/MDM:    Dion Thomson is a 54 year old male is here today for ongoing cough and postnasal drip.  I am going to do a chest x-ray I see no acute pneumonia here.  However may have a sinus infection that is difficult to clear.  I am going to place on antibiotics and steroids.  Follow-up if fails to improve as anticipated.        HPI:  Has completed 1 course of antibiotics for sinus infection however still having cough and purulent drainage.  In the past has required 2 rounds of antibiotics as well as steroids.    Cough  This is a new problem. The current episode started more than 1 week ago. The problem occurs constantly. The problem has not changed since onset.The cough is Productive of sputum.        Review of Systems   HENT:  Positive for sinus pressure and sinus pain.    Respiratory:  Positive for cough.    All other systems reviewed and are negative.      Vitals:    02/02/25 1320   BP: 119/76   BP Location: Left arm   Patient Position: Chair   Cuff Size: Adult Regular   Pulse: 76   Resp: 16   Temp: 98  F (36.7  C)   TempSrc: Oral   SpO2: 97%       Physical Exam  Vitals and nursing note reviewed.   HENT:      Nose: Congestion present.   Pulmonary:      Effort: Pulmonary effort is normal.   Neurological:      Mental Status: He is alert.         Results:  No results found for any visits on 02/02/25.        Past Medical History: has been reviewed by me. I have also reviewed past visits, lab results and studies  Adverse Drug Reactions: Seasonal allergies    Medications: reviewed by me today    Family History: Reviewed by me today  Social History:   Social History     Tobacco Use    Smoking status: Never    Smokeless tobacco: Never   Substance Use Topics    Alcohol use: Yes     Alcohol/week: 0.0 - 0.8 standard drinks of alcohol     Comment: Occasional       Tobacco:   History   Smoking Status    Never    Smokeless Tobacco    Never         I have reviewed and recommended any over-the-counter medications that will aid in the symptomatic relief of this illness.    The risk of complications, morbidity, and/or mortality of patient management decisions were made during the visit with the patient. These may be associated with the patient s problems, the diagnostic procedures, or the treatment. This includes possible management options selected, as well options considered but ultimately not selected, after shared medical decision making with the patient and/or family.        ICD-10-CM    1. Acute cough  R05.1 XR Chest 2 Views     doxycycline hyclate (VIBRAMYCIN) 100 MG capsule     predniSONE (DELTASONE) 20 MG tablet           Elizabeth Caban MD  2/2/2025, 5:39 PM.      Patient Instructions   Discharge instructions from Dr. Caban:    Today you were seen for:  ongoing cough    The Plan for you to get better is :  Doxy and prednisone     What do I do if this does not change or fails to improve? :    I anticipate you will improve in 5 to 7 days. If you fail to improve or worsen please be reseen by your primary care physician or clinician, or urgent care.  If you are worse please go to the emergency room.       What to do if I am really worse? :    If you feel you are woserning with life threatening symptoms such as: a very fast heart rate difficulty breathing, increasing confusion, uncontrolled pain, the inability to keep any solids or liquids down, a failure to urinate or other abnormal and worsening symptoms go to an emergency room immediately.        Where are my test results if they were not back when I was discharged? :      Test results done in Urgent Care are released automatically as soon as they are resulted via [employer]. There are some instances when we call you with positive same day results (strep, covid,urine culture) They may also be mailed to you if you do not have my chart.      If you are waiting on a test  for strep throat and it is positive medication will be called into the pharmacy of record.        FYI if you were prescribed narcotics:    If you have been prescribed any narcotic pain medication in the urgent care these medications are not refilled by Urgent Care and you should see her primary care physician for any refills.  Of note any narcotic pain medication has the potential to become habit-forming and can impact a person sobriety and recovery. If they were prescribed for medically appropriate reason and are causing these unintended effects please discuss with your primary care physician immediately.    Your health is important to all of us here at [facility] and the Urgent Care and we appreciate your trust and partnership in your return to health here today.

## 2025-02-26 ENCOUNTER — ANCILLARY PROCEDURE (OUTPATIENT)
Dept: GENERAL RADIOLOGY | Facility: CLINIC | Age: 55
End: 2025-02-26
Attending: FAMILY MEDICINE
Payer: COMMERCIAL

## 2025-02-26 ENCOUNTER — OFFICE VISIT (OUTPATIENT)
Dept: ORTHOPEDICS | Facility: CLINIC | Age: 55
End: 2025-02-26
Payer: COMMERCIAL

## 2025-02-26 VITALS — WEIGHT: 227 LBS | HEIGHT: 69 IN | BODY MASS INDEX: 33.62 KG/M2

## 2025-02-26 DIAGNOSIS — M17.11 PRIMARY OSTEOARTHRITIS OF RIGHT KNEE: ICD-10-CM

## 2025-02-26 DIAGNOSIS — Z87.828 HX OF TEAR OF ACL (ANTERIOR CRUCIATE LIGAMENT): ICD-10-CM

## 2025-02-26 DIAGNOSIS — M25.562 ACUTE PAIN OF LEFT KNEE: Primary | ICD-10-CM

## 2025-02-26 DIAGNOSIS — M25.562 ACUTE PAIN OF LEFT KNEE: ICD-10-CM

## 2025-02-26 PROCEDURE — G2211 COMPLEX E/M VISIT ADD ON: HCPCS | Performed by: FAMILY MEDICINE

## 2025-02-26 PROCEDURE — 99204 OFFICE O/P NEW MOD 45 MIN: CPT | Performed by: FAMILY MEDICINE

## 2025-02-26 NOTE — LETTER
2025      Dion Thomson  [address] [address]      Dear Colleague,    Thank you for referring your patient, Dion Thomson, to the [facility]. Please see a copy of my visit note below.    Dion Thomson  :  1970  DOS: 2025  MRN: [id number]    Sports Medicine Clinic Visit    PCP: Viral Rivera    Dion Thomson is a 54 year old male who is seen as a WALK IN patient presenting with left knee pain.    Injury: Patient describes injury as stepped awkwardly noting sharp pain that occurred ~ 5 days ago.  Pain located over left medial knee, nonradiating.  Additional Features:  Positive: swelling and grinding.  Symptoms are better with activity modification.  Symptoms are worse with: descending stairs, lateral movement, going from sit to stand.  Other evaluation and/or treatments so far consists of: Ice and Rest.  Recent imaging completed: No recent imaging completed.  Prior History of related problems: history of status post menisectomy in , ACL repair in .    Social History: currently employed as mental health therapist    Review of Systems  Musculoskeletal: as above  Remainder of review of systems is negative including constitutional, CV, pulmonary, GI, Skin and Neurologic except as noted in HPI or medical history.    Past Medical History:   Diagnosis Date     Allergic rhinitis      Anxiety      Chronic sinusitis      Chronic sinusitis      Gastroesophageal reflux disease      Hypercholesteremia      Intermittent asthma 2013     Obstructive sleep apnea      URBANO (obstructive sleep apnea) 2014     Past Surgical History:   Procedure Laterality Date     ARTHROSCOPIC RECONSTRUCTION ANTERIOR CRUCIATE LIGAMENT      Open, Knee Left     ARTHROSCOPY KNEE RT/LT Left 2005    meniscus repair     EXTRACTION(S) DENTAL       OPTICAL TRACKING SYSTEM ENDOSCOPIC SINUS SURGERY Bilateral 2019    Procedure: Image Guided Bilateral Functional  "Endoscopic Sinus Surgery, Bilateral turbinate reduction;  Surgeon: Willi Garcia MD;  Location:  OR     OPTICAL TRACKING SYSTEM ENDOSCOPIC SINUS SURGERY Bilateral 12/18/2023    Procedure: STEALTH GUIDED BILATERAL FRONTAL SINUSOTOMY;  Surgeon: Willi Garcia MD;  [place] [facility] [place]     TONSILLECTOMY & ADENOIDECTOMY  5 yo     VASECTOMY      Vasectomy     Family History   Problem Relation Age of Onset     Diabetes Mother      Bipolar Disorder Mother      Anesthesia Reaction Mother         high tolerance     Coronary Artery Disease Father      Myocardial Infarction Father      Gout Brother      Hyperlipidemia Brother      Diabetes Maternal Grandmother      Myocardial Infarction Maternal Grandfather      Coronary Artery Disease Early Onset Maternal Grandfather      Unknown/Adopted Paternal Grandmother      Liver Cancer Paternal Grandfather      No Known Problems Daughter      No Known Problems Daughter      Diabetes Maternal Aunt      Mental Illness Maternal Aunt      Cancer Other         multiple, both sides     Heart Disease Other        Objective  Ht 1.753 m (5' 9\")   Wt 103 kg (227 lb)   BMI 33.52 kg/m      General: healthy, alert and in no distress    HEENT: no scleral icterus or conjunctival erythema   Skin: no suspicious lesions or rash. No jaundice.   CV: regular rhythm by palpation, 2+ distal pulses, no pedal edema    Resp: normal respiratory effort without conversational dyspnea   Psych: normal mood and affect    Gait: nonantalgic, appropriate coordination and balance   Neuro: normal light touch sensory exam of the extremities. Motor strength as noted below     Left Knee exam    ROM:        Mildly limited terminal active and passive ROM with flexion and extension    Inspection:       no visible ecchymosis        effusion noted small    Skin:       no visible deformities       well perfused       capillary refill brisk    Patellar Motion:        Normal patellar tracking noted through range of motion       " Crepitus noted in the patellofemoral joint    Tender:        lateral patellar border       medial joint line most focal       lateral joint line    Non Tender:         remainder of knee area    Special Tests:        neg (-) Teresa       loose Lachman but good endpoint       neg (-) anterior drawer       neg (-) posterior drawer       neg (-) varus at 0 deg and 30 deg       neg (-) valgus at 0 deg and 30 deg    Evaluation of ipsilateral kinetic chain       normal strength with hip extension and abduction      Radiology  XR images independently visualized and reviewed with patient today in clinic  Moderate/advanced medial compartment narrowing, milder degenerative changes elsewhere  Prior ACL surgical anchor screws noted  Prominent tibial tubercle from old osgood schlatter's  No acute bony pathology noted, will follow final radiology read    Assessment:  1. Acute pain of left knee    2. Hx of tear of ACL (anterior cruciate ligament)    3. Primary osteoarthritis of right knee        Plan:  Discussed the assessment with the patient.  Follow up: 1-2 months if needed, sooner if worsening  Continue to follow up with me for further treatment and recommendations  Recent acute flare of pain without significant injury  Hx of both ACL reconstruction and arthroscopic meniscectomy  Moderate-advanced knee DJD noted on XR today  XR images independently visualized and reviewed with patient today in clinic  RICE reviewed  Bracing options, assistive devices, activity modification reviewed  PT options and low impact strategies reviewed  Reviewed activity modification and progressive increase in activity as tolerated and guided by pain  Reviewed options for potential steroid vs viscosupplementation injections and the possibility for future orthopedic referral prn  Reviewed safe and appropriate OTC medication choices, try tylenol first  Up to 3000mg daily of tylenol is generally safe, NSAID dosing and duration limitations  reviewed  Discussed nature of degenerative arthrosis of the knee.   Discussed symptom treatment with ice or heat, topical treatments, and rest if needed.   He will work on HEP and activity modification with supportive care for now  Welcome back anytime for further discussion and action as needed  We discussed modified progressive pain-free activity as tolerated  Home handouts provided and supportive care reviewed  All questions were answered today  Contact us with additional questions or concerns  Signs and sx of concern reviewed      Danis Costello DO, CAQ  Sports Medicine Physician  [facility]          Disclaimer: This note consists of symbols derived from keyboarding, dictation and/or voice recognition software. As a result, there may be errors in the script that have gone undetected. Please consider this when interpreting information found in this chart.      Again, thank you for allowing me to participate in the care of your patient.        Sincerely,        Danis Costello DO    Electronically signed

## 2025-02-26 NOTE — PROGRESS NOTES
Dion Thomson  :  1970  DOS: 2025  MRN: [id number]    Sports Medicine Clinic Visit    PCP: Viral Rivera    Dion Thomson is a 54 year old male who is seen as a WALK IN patient presenting with left knee pain.    Injury: Patient describes injury as stepped awkwardly noting sharp pain that occurred ~ 5 days ago.  Pain located over left medial knee, nonradiating.  Additional Features:  Positive: swelling and grinding.  Symptoms are better with activity modification.  Symptoms are worse with: descending stairs, lateral movement, going from sit to stand.  Other evaluation and/or treatments so far consists of: Ice and Rest.  Recent imaging completed: No recent imaging completed.  Prior History of related problems: history of status post menisectomy in , ACL repair in .    Social History: currently employed as mental health therapist    Review of Systems  Musculoskeletal: as above  Remainder of review of systems is negative including constitutional, CV, pulmonary, GI, Skin and Neurologic except as noted in HPI or medical history.    Past Medical History:   Diagnosis Date    Allergic rhinitis     Anxiety     Chronic sinusitis     Chronic sinusitis     Gastroesophageal reflux disease     Hypercholesteremia     Intermittent asthma 2013    Obstructive sleep apnea     URBANO (obstructive sleep apnea) 2014     Past Surgical History:   Procedure Laterality Date    ARTHROSCOPIC RECONSTRUCTION ANTERIOR CRUCIATE LIGAMENT      Open, Knee Left    ARTHROSCOPY KNEE RT/LT Left 2005    meniscus repair    EXTRACTION(S) DENTAL      OPTICAL TRACKING SYSTEM ENDOSCOPIC SINUS SURGERY Bilateral 2019    Procedure: Image Guided Bilateral Functional Endoscopic Sinus Surgery, Bilateral turbinate reduction;  Surgeon: Willi Garcia MD;  Location:  OR    OPTICAL TRACKING SYSTEM ENDOSCOPIC SINUS SURGERY Bilateral 2023    Procedure: STEALTH GUIDED BILATERAL FRONTAL SINUSOTOMY;  Surgeon: Willi Garcia  "MD RAFAELA;  [place] [facility] [place]    TONSILLECTOMY & ADENOIDECTOMY  5 yo    VASECTOMY      Vasectomy     Family History   Problem Relation Age of Onset    Diabetes Mother     Bipolar Disorder Mother     Anesthesia Reaction Mother         high tolerance    Coronary Artery Disease Father     Myocardial Infarction Father     Gout Brother     Hyperlipidemia Brother     Diabetes Maternal Grandmother     Myocardial Infarction Maternal Grandfather     Coronary Artery Disease Early Onset Maternal Grandfather     Unknown/Adopted Paternal Grandmother     Liver Cancer Paternal Grandfather     No Known Problems Daughter     No Known Problems Daughter     Diabetes Maternal Aunt     Mental Illness Maternal Aunt     Cancer Other         multiple, both sides    Heart Disease Other        Objective  Ht 1.753 m (5' 9\")   Wt 103 kg (227 lb)   BMI 33.52 kg/m      General: healthy, alert and in no distress    HEENT: no scleral icterus or conjunctival erythema   Skin: no suspicious lesions or rash. No jaundice.   CV: regular rhythm by palpation, 2+ distal pulses, no pedal edema    Resp: normal respiratory effort without conversational dyspnea   Psych: normal mood and affect    Gait: nonantalgic, appropriate coordination and balance   Neuro: normal light touch sensory exam of the extremities. Motor strength as noted below     Left Knee exam    ROM:        Mildly limited terminal active and passive ROM with flexion and extension    Inspection:       no visible ecchymosis        effusion noted small    Skin:       no visible deformities       well perfused       capillary refill brisk    Patellar Motion:        Normal patellar tracking noted through range of motion       Crepitus noted in the patellofemoral joint    Tender:        lateral patellar border       medial joint line most focal       lateral joint line    Non Tender:         remainder of knee area    Special Tests:        neg (-) Teresa       loose Lachman but good endpoint       neg " (-) anterior drawer       neg (-) posterior drawer       neg (-) varus at 0 deg and 30 deg       neg (-) valgus at 0 deg and 30 deg    Evaluation of ipsilateral kinetic chain       normal strength with hip extension and abduction      Radiology  XR images independently visualized and reviewed with patient today in clinic  Moderate/advanced medial compartment narrowing, milder degenerative changes elsewhere  Prior ACL surgical anchor screws noted  Prominent tibial tubercle from old osgood schlatter's  No acute bony pathology noted, will follow final radiology read    Assessment:  1. Acute pain of left knee    2. Hx of tear of ACL (anterior cruciate ligament)    3. Primary osteoarthritis of right knee        Plan:  Discussed the assessment with the patient.  Follow up: 1-2 months if needed, sooner if worsening  Continue to follow up with me for further treatment and recommendations  Recent acute flare of pain without significant injury  Hx of both ACL reconstruction and arthroscopic meniscectomy  Moderate-advanced knee DJD noted on XR today  XR images independently visualized and reviewed with patient today in clinic  RICE reviewed  Bracing options, assistive devices, activity modification reviewed  PT options and low impact strategies reviewed  Reviewed activity modification and progressive increase in activity as tolerated and guided by pain  Reviewed options for potential steroid vs viscosupplementation injections and the possibility for future orthopedic referral prn  Reviewed safe and appropriate OTC medication choices, try tylenol first  Up to 3000mg daily of tylenol is generally safe, NSAID dosing and duration limitations reviewed  Discussed nature of degenerative arthrosis of the knee.   Discussed symptom treatment with ice or heat, topical treatments, and rest if needed.   He will work on HEP and activity modification with supportive care for now  Welcome back anytime for further discussion and action as  needed  We discussed modified progressive pain-free activity as tolerated  Home handouts provided and supportive care reviewed  All questions were answered today  Contact us with additional questions or concerns  Signs and sx of concern reviewed      Danis Costello DO, LUCIANA  Sports Medicine Physician  [facility]          Disclaimer: This note consists of symbols derived from keyboarding, dictation and/or voice recognition software. As a result, there may be errors in the script that have gone undetected. Please consider this when interpreting information found in this chart.

## 2025-03-10 NOTE — PATIENT INSTRUCTIONS
Patient Education   Preventive Care Advice   This is general advice given by our system to help you stay healthy. However, your care team may have specific advice just for you. Please talk to your care team about your preventive care needs.  Nutrition  Eat 5 or more servings of fruits and vegetables each day.  Try wheat bread, brown rice and whole grain pasta (instead of white bread, rice, and pasta).  Get enough calcium and vitamin D. Check the label on foods and aim for 100% of the RDA (recommended daily allowance).  Lifestyle  Exercise at least 150 minutes each week  (30 minutes a day, 5 days a week).  Do muscle strengthening activities 2 days a week. These help control your weight and prevent disease.  No smoking.  Wear sunscreen to prevent skin cancer.  Have a dental exam and cleaning every 6 months.  Yearly exams  See your health care team every year to talk about:  Any changes in your health.  Any medicines your care team has prescribed.  Preventive care, family planning, and ways to prevent chronic diseases.  Shots (vaccines)   HPV shots (up to age 26), if you've never had them before.  Hepatitis B shots (up to age 59), if you've never had them before.  COVID-19 shot: Get this shot when it's due.  Flu shot: Get a flu shot every year.  Tetanus shot: Get a tetanus shot every 10 years.  Pneumococcal, hepatitis A, and RSV shots: Ask your care team if you need these based on your risk.  Shingles shot (for age 50 and up)  General health tests  Diabetes screening:  Starting at age 35, Get screened for diabetes at least every 3 years.  If you are younger than age 35, ask your care team if you should be screened for diabetes.  Cholesterol test: At age 39, start having a cholesterol test every 5 years, or more often if advised.  Bone density scan (DEXA): At age 50, ask your care team if you should have this scan for osteoporosis (brittle bones).  Hepatitis C: Get tested at least once in your life.  STIs (sexually  transmitted infections)  Before age 24: Ask your care team if you should be screened for STIs.  After age 24: Get screened for STIs if you're at risk. You are at risk for STIs (including HIV) if:  You are sexually active with more than one person.  You don't use condoms every time.  You or a partner was diagnosed with a sexually transmitted infection.  If you are at risk for HIV, ask about PrEP medicine to prevent HIV.  Get tested for HIV at least once in your life, whether you are at risk for HIV or not.  Cancer screening tests  Cervical cancer screening: If you have a cervix, begin getting regular cervical cancer screening tests starting at age 21.  Breast cancer scan (mammogram): If you've ever had breasts, begin having regular mammograms starting at age 40. This is a scan to check for breast cancer.  Colon cancer screening: It is important to start screening for colon cancer at age 45.  Have a colonoscopy test every 10 years (or more often if you're at risk) Or, ask your provider about stool tests like a FIT test every year or Cologuard test every 3 years.  To learn more about your testing options, visit:   .  For help making a decision, visit:   https://bit.ly/ht75750.  Prostate cancer screening test: If you have a prostate, ask your care team if a prostate cancer screening test (PSA) at age 55 is right for you.  Lung cancer screening: If you are a current or former smoker ages 50 to 80, ask your care team if ongoing lung cancer screenings are right for you.  For informational purposes only. Not to replace the advice of your health care provider. Copyright   2023 [employer]. All rights reserved. Clinically reviewed by the [facility] Transitions Program. [employer] [id number] - REV 01/24.

## 2025-03-11 ENCOUNTER — OFFICE VISIT (OUTPATIENT)
Dept: FAMILY MEDICINE | Facility: CLINIC | Age: 55
End: 2025-03-11

## 2025-03-11 VITALS
DIASTOLIC BLOOD PRESSURE: 75 MMHG | HEART RATE: 75 BPM | OXYGEN SATURATION: 95 % | SYSTOLIC BLOOD PRESSURE: 101 MMHG | HEIGHT: 69 IN | WEIGHT: 221.8 LBS | BODY MASS INDEX: 32.85 KG/M2

## 2025-03-11 DIAGNOSIS — J45.20 MILD INTERMITTENT ASTHMA WITHOUT COMPLICATION: ICD-10-CM

## 2025-03-11 DIAGNOSIS — K21.9 GASTROESOPHAGEAL REFLUX DISEASE WITHOUT ESOPHAGITIS: ICD-10-CM

## 2025-03-11 DIAGNOSIS — Z13.6 SCREENING FOR HEART DISEASE: ICD-10-CM

## 2025-03-11 DIAGNOSIS — Z00.00 ROUTINE GENERAL MEDICAL EXAMINATION AT A HEALTH CARE FACILITY: Primary | ICD-10-CM

## 2025-03-11 DIAGNOSIS — Z23 NEED FOR VACCINATION: ICD-10-CM

## 2025-03-11 DIAGNOSIS — G47.33 OSA (OBSTRUCTIVE SLEEP APNEA): ICD-10-CM

## 2025-03-11 DIAGNOSIS — E78.00 HYPERCHOLESTEREMIA: ICD-10-CM

## 2025-03-11 DIAGNOSIS — J32.1 CHRONIC FRONTAL SINUSITIS: ICD-10-CM

## 2025-03-11 LAB
% GRANULOCYTES: 75.5 % (ref 42.2–75.2)
ALBUMIN SERPL BCG-MCNC: 4.3 G/DL (ref 3.5–5.2)
ALP SERPL-CCNC: 68 U/L (ref 40–150)
ALT SERPL W P-5'-P-CCNC: 24 U/L (ref 0–70)
ANION GAP SERPL CALCULATED.3IONS-SCNC: 9 MMOL/L (ref 7–15)
AST SERPL W P-5'-P-CCNC: 22 U/L (ref 0–45)
BILIRUB SERPL-MCNC: 0.3 MG/DL
BUN SERPL-MCNC: 12 MG/DL (ref 6–20)
CALCIUM SERPL-MCNC: 9.5 MG/DL (ref 8.8–10.4)
CHLORIDE SERPL-SCNC: 104 MMOL/L (ref 98–107)
CHOLESTEROL: 155 MG/DL (ref 100–199)
CREAT SERPL-MCNC: 0.89 MG/DL (ref 0.67–1.17)
EGFRCR SERPLBLD CKD-EPI 2021: >90 ML/MIN/1.73M2
FASTING STATUS PATIENT QL REPORTED: YES
FASTING?: YES
GLUCOSE SERPL-MCNC: 122 MG/DL (ref 70–99)
HCO3 SERPL-SCNC: 26 MMOL/L (ref 22–29)
HCT VFR BLD AUTO: 40.9 % (ref 39–51)
HDL (RMG): 27 MG/DL (ref 40–?)
HEMOGLOBIN: 13.7 G/DL (ref 13.4–17.5)
LDL CALCULATED (RMG): 89 MG/DL (ref 0–130)
LYMPHOCYTES NFR BLD AUTO: 18.2 % (ref 20.5–51.1)
MCH RBC QN AUTO: 26.9 PG (ref 27–31)
MCHC RBC AUTO-ENTMCNC: 33.6 G/DL (ref 33–37)
MCV RBC AUTO: 80.1 FL (ref 80–100)
MONOCYTES NFR BLD AUTO: 6.3 % (ref 1.7–9.3)
PLATELET # BLD AUTO: 257 K/UL (ref 140–450)
POTASSIUM SERPL-SCNC: 4.1 MMOL/L (ref 3.4–5.3)
PROT SERPL-MCNC: 7.4 G/DL (ref 6.4–8.3)
RBC # BLD AUTO: 5.1 X10/CMM (ref 4.2–5.9)
SODIUM SERPL-SCNC: 139 MMOL/L (ref 135–145)
TRIGLYCERIDES (RMG): 195 MG/DL (ref 0–149)
WBC # BLD AUTO: 7 X10/CMM (ref 3.8–11)

## 2025-03-11 PROCEDURE — 90480 ADMN SARSCOV2 VAC 1/ONLY CMP: CPT | Performed by: FAMILY MEDICINE

## 2025-03-11 PROCEDURE — 90677 PCV20 VACCINE IM: CPT | Performed by: FAMILY MEDICINE

## 2025-03-11 PROCEDURE — 99213 OFFICE O/P EST LOW 20 MIN: CPT | Mod: 25 | Performed by: FAMILY MEDICINE

## 2025-03-11 PROCEDURE — 80053 COMPREHEN METABOLIC PANEL: CPT | Performed by: FAMILY MEDICINE

## 2025-03-11 PROCEDURE — 85025 COMPLETE CBC W/AUTO DIFF WBC: CPT | Performed by: FAMILY MEDICINE

## 2025-03-11 PROCEDURE — 36415 COLL VENOUS BLD VENIPUNCTURE: CPT | Performed by: FAMILY MEDICINE

## 2025-03-11 PROCEDURE — 99396 PREV VISIT EST AGE 40-64: CPT | Mod: 25 | Performed by: FAMILY MEDICINE

## 2025-03-11 PROCEDURE — 80061 LIPID PANEL: CPT | Mod: QW | Performed by: FAMILY MEDICINE

## 2025-03-11 PROCEDURE — 3078F DIAST BP <80 MM HG: CPT | Performed by: FAMILY MEDICINE

## 2025-03-11 PROCEDURE — 90472 IMMUNIZATION ADMIN EACH ADD: CPT | Performed by: FAMILY MEDICINE

## 2025-03-11 PROCEDURE — 3074F SYST BP LT 130 MM HG: CPT | Performed by: FAMILY MEDICINE

## 2025-03-11 PROCEDURE — 90471 IMMUNIZATION ADMIN: CPT | Performed by: FAMILY MEDICINE

## 2025-03-11 PROCEDURE — 91320 SARSCV2 VAC 30MCG TRS-SUC IM: CPT | Performed by: FAMILY MEDICINE

## 2025-03-11 PROCEDURE — 90715 TDAP VACCINE 7 YRS/> IM: CPT | Performed by: FAMILY MEDICINE

## 2025-03-11 RX ORDER — ALBUTEROL SULFATE 90 UG/1
2 INHALANT RESPIRATORY (INHALATION) EVERY 6 HOURS PRN
Qty: 54 G | Refills: 3 | Status: SHIPPED | OUTPATIENT
Start: 2025-03-11

## 2025-03-11 RX ORDER — OMEPRAZOLE 20 MG/1
CAPSULE, DELAYED RELEASE ORAL
Qty: 90 CAPSULE | Refills: 3 | Status: SHIPPED | OUTPATIENT
Start: 2025-03-11

## 2025-03-11 RX ORDER — ATORVASTATIN CALCIUM 10 MG/1
10 TABLET, FILM COATED ORAL DAILY
Qty: 90 TABLET | Refills: 3 | Status: SHIPPED | OUTPATIENT
Start: 2025-03-11

## 2025-03-11 SDOH — HEALTH STABILITY: PHYSICAL HEALTH: ON AVERAGE, HOW MANY MINUTES DO YOU ENGAGE IN EXERCISE AT THIS LEVEL?: 20 MIN

## 2025-03-11 SDOH — HEALTH STABILITY: PHYSICAL HEALTH: ON AVERAGE, HOW MANY DAYS PER WEEK DO YOU ENGAGE IN MODERATE TO STRENUOUS EXERCISE (LIKE A BRISK WALK)?: 5 DAYS

## 2025-03-11 ASSESSMENT — ASTHMA QUESTIONNAIRES
QUESTION_4 LAST FOUR WEEKS HOW OFTEN HAVE YOU USED YOUR RESCUE INHALER OR NEBULIZER MEDICATION (SUCH AS ALBUTEROL): ONE OR TWO TIMES PER DAY
QUESTION_2 LAST FOUR WEEKS HOW OFTEN HAVE YOU HAD SHORTNESS OF BREATH: ONCE OR TWICE A WEEK
QUESTION_3 LAST FOUR WEEKS HOW OFTEN DID YOUR ASTHMA SYMPTOMS (WHEEZING, COUGHING, SHORTNESS OF BREATH, CHEST TIGHTNESS OR PAIN) WAKE YOU UP AT NIGHT OR EARLIER THAN USUAL IN THE MORNING: ONCE A WEEK
QUESTION_5 LAST FOUR WEEKS HOW WOULD YOU RATE YOUR ASTHMA CONTROL: SOMEWHAT CONTROLLED
QUESTION_1 LAST FOUR WEEKS HOW MUCH OF THE TIME DID YOUR ASTHMA KEEP YOU FROM GETTING AS MUCH DONE AT WORK, SCHOOL OR AT HOME: NONE OF THE TIME
ACT_TOTALSCORE: 17
ACT_TOTALSCORE: 17

## 2025-03-11 ASSESSMENT — SOCIAL DETERMINANTS OF HEALTH (SDOH): HOW OFTEN DO YOU GET TOGETHER WITH FRIENDS OR RELATIVES?: ONCE A WEEK

## 2025-03-11 NOTE — PROGRESS NOTES
"Preventive Care Visit  [facility]  Viral Rivera MD, Family Medicine  Mar 11, 2025      Assessment & Plan     Routine general medical examination at a health care facility      Hypercholesteremia  Has Hyperlipidemia   On listed medication  Last lab result are:  Cholesterol   Date Value Ref Range Status   07/26/2021 171 100 - 199 mg/dL Final   10/06/2020 187 100 - 199 mg/dL Final     HDL Cholesterol   Date Value Ref Range Status   07/26/2021 43 >39 mg/dL Final   10/06/2020 45 >39 mg/dL Final     LDL Cholesterol Calculated   Date Value Ref Range Status   07/26/2021 104 (H) 0 - 99 mg/dL Final   10/06/2020 117 (H) 0 - 99 mg/dL Final     Triglycerides   Date Value Ref Range Status   07/26/2021 134 0 - 149 mg/dL Final   10/06/2020 141 0 - 149 mg/dL Final     No results found for: \"CHOLHDLRATIO\"  Will continue current medication or adjust based on lab results      Gastroesophageal reflux disease without esophagitis  Discussed the functional nature of this condition regarding what they eat, how they eat, when they eat, and how much they eat as all contributing to gastroesophageal symptoms.    Recommend anti-reflux diet and eating patterns emphasizing smaller more frequent meals and timing relative to bedtime.  Also recommend avoiding tomatoes, onions, spicy foods, caffeine, or any other food/beverage that cause increased reflux.    If symptoms not controlled on current therapies, then need to return for further evaluation and consideration of further studies.      Mild intermittent asthma without complication  Long term, tolerating current inhalersare well tolerated..  Last asthma Control Test (ACT) <20  Have rescue inhaler available and use all throughout the year as needed.  Call us if any worsening.        Patient has been advised of split billing requirements and indicates understanding: Yes      BMI  Estimated body mass index is 32.75 kg/m  as calculated from the following:    Height as of this " "encounter: 1.753 m (5' 9\").    Weight as of this encounter: 100.6 kg (221 lb 12.8 oz).   Weight management plan: Discussed healthy diet and exercise guidelines    Counseling  Appropriate preventive services were addressed with this patient via screening, questionnaire, or discussion as appropriate for fall prevention, nutrition, physical activity, Tobacco-use cessation, social engagement, weight loss and cognition.  Checklist reviewing preventive services available has been given to the patient.  Reviewed patient's diet, addressing concerns and/or questions.   He is at risk for psychosocial distress and has been provided with information to reduce risk.       Work on weight loss    Return in about 53 weeks (around 3/17/2026) for Annual Wellness Visit.    Tom Ashley is a 54 year old, presenting for the following:  Physical (Fasting - physical.//No questions or concerns.)           HPI  Here for check up and to follow up on the above    Advance Care Planning  Patient does not have a Health Care Directive: Discussed advance care planning with patient; information given to patient to review.      3/11/2025   General Health   How would you rate your overall physical health? Good   Feel stress (tense, anxious, or unable to sleep) To some extent   (!) STRESS CONCERN      3/11/2025   Nutrition   Three or more servings of calcium each day? Yes   Diet: Vegetarian/vegan   How many servings of fruit and vegetables per day? (!) 2-3   How many sweetened beverages each day? 0-1         3/11/2025   Exercise   Days per week of moderate/strenous exercise 5 days   Average minutes spent exercising at this level 20 min         3/11/2025   Social Factors   Frequency of gathering with friends or relatives Once a week   Worry food won't last until get money to buy more No   Food not last or not have enough money for food? No   Do you have housing? (Housing is defined as stable permanent housing and does not include [facility] in a [facility], in a tent, in an abandoned building, in an overnight shelter, or couch-surfing.) Yes   Are you worried about losing your housing? No   Lack of transportation? No   Unable to get utilities (heat,electricity)? No         3/11/2025   Fall Risk   Fallen 2 or more times in the past year? No   Trouble with walking or balance? No          3/11/2025   Dental   Dentist two times every year? Yes           Today's PHQ-2 Score:       3/11/2025     6:45 AM   PHQ-2 ( 1999 [employer])   Q1: Little interest or pleasure in doing things 0   Q2: Feeling down, depressed or hopeless 0   PHQ-2 Score 0    Q1: Little interest or pleasure in doing things Not at all   Q2: Feeling down, depressed or hopeless Not at all   PHQ-2 Score 0       Patient-reported           3/11/2025   Substance Use   Alcohol more than 3/day or more than 7/wk No   Do you use any other substances recreationally? No     Social History     Tobacco Use    Smoking status: Never    Smokeless tobacco: Never   Vaping Use    Vaping status: Never Used   Substance Use Topics    Alcohol use: Yes     Alcohol/week: 0.0 - 0.8 standard drinks of alcohol     Comment: Occasional    Drug use: No             3/11/2025   One time HIV Screening   Previous HIV test? Yes         3/11/2025   STI Screening   New sexual partner(s) since last STI/HIV test? No   ASCVD Risk   The 10-year ASCVD risk score (Asuncion TY, et al., 2019) is: 2.6%    Values used to calculate the score:      Age: 54 years      Sex: Male      Is Non- : No      Diabetic: No      Tobacco smoker: No      Systolic Blood Pressure: 101 mmHg      Is BP treated: No      HDL Cholesterol: 43 mg/dL      Total Cholesterol: 171 mg/dL           Reviewed and updated as needed this visit by Provider                    Lab work is in process      Review of Systems  Constitutional, HEENT, cardiovascular, pulmonary, GI, , musculoskeletal, neuro, skin, endocrine and psych systems are negative, except  "as otherwise noted.     Objective    Exam  /75   Pulse 75   Ht 1.753 m (5' 9\")   Wt 100.6 kg (221 lb 12.8 oz)   SpO2 95%   BMI 32.75 kg/m     Estimated body mass index is 32.75 kg/m  as calculated from the following:    Height as of this encounter: 1.753 m (5' 9\").    Weight as of this encounter: 100.6 kg (221 lb 12.8 oz).    Physical Exam  GENERAL: alert and no distress  EYES: Eyes grossly normal to inspection, PERRL and conjunctivae and sclerae normal  HENT: ear canals and TM's normal, nose and mouth without ulcers or lesions  NECK: no adenopathy, no asymmetry, masses, or scars  RESP: lungs clear to auscultation - no rales, rhonchi or wheezes  CV: regular rate and rhythm, normal S1 S2, no S3 or S4, no murmur, click or rub, no peripheral edema  ABDOMEN: soft, nontender, no hepatosplenomegaly, no masses and bowel sounds normal  MS: no gross musculoskeletal defects noted, no edema  MS: Osgood shlatter bumps on both knees/  SKIN: no suspicious lesions or rashes  NEURO: Normal strength and tone, mentation intact and speech normal  PSYCH: mentation appears normal, affect normal/bright        Signed Electronically by: Viral Rivera MD    "

## 2025-03-11 NOTE — LETTER
My Asthma Action Plan    Name: Dion Thomson   YOB: 1970  Date: 3/11/2025   My doctor: Viral Rivera MD   My clinic: [facility]        My Control Medicine: Fluticasone propionate + salmeterol (AirDuo RespiClick) -  232/14 mcg BID  My Rescue Medicine: Albuterol (Proair/Ventolin/Proventil HFA) 2-4 puffs EVERY 4 HOURS as needed. Use a spacer if recommended by your provider.   My Asthma Severity:   Mild Persistent  Know your asthma triggers: upper respiratory infections, pollens, animal dander, mold, strong odors and fumes, and cold air               GREEN ZONE   Good Control  I feel good  No cough or wheeze  Can work, sleep and play without asthma symptoms       Take your asthma control medicine every day.     If exercise triggers your asthma, take your rescue medication  15 minutes before exercise or sports, and  During exercise if you have asthma symptoms  Spacer to use with inhaler: If you have a spacer, make sure to use it with your inhaler             YELLOW ZONE Getting Worse  I have ANY of these:  I do not feel good  Cough or wheeze  Chest feels tight  Wake up at night   Keep taking your Green Zone medications  Start taking your rescue medicine:  every 20 minutes for up to 1 hour. Then every 4 hours for 24-48 hours.  If you stay in the Yellow Zone for more than 12-24 hours, contact your doctor.  If you do not return to the Green Zone in 12-24 hours or you get worse, start taking your oral steroid medicine if prescribed by your provider.           RED ZONE Medical Alert - Get Help  I have ANY of these:  I feel awful  Medicine is not helping  Breathing getting harder  Trouble walking or talking  Nose opens wide to breathe       Take your rescue medicine NOW  If your provider has prescribed an oral steroid medicine, start taking it NOW  Call your doctor NOW  If you are still in the Red Zone after 20 minutes and you have not reached your doctor:  Take your rescue medicine again  and  Call 911 or go to the emergency room right away    See your regular doctor within 2 weeks of an Emergency Room or Urgent Care visit for follow-up treatment.          Annual Reminders:  Meet with Asthma Educator,  Flu Shot in the Fall, consider Pneumonia Vaccination for patients with asthma (aged 19 and older).    Pharmacy:    [id number] [address] [place] [address] [facility] [address]    Electronically signed by Viral Rivera MD   Date: 03/11/25                      Asthma Triggers  How To Control Things That Make Your Asthma Worse    Triggers are things that make your asthma worse.  Look at the list below to help you find your triggers and what you can do about them.  You can help prevent asthma flare-ups by staying away from your triggers.      Trigger                                                          What you can do   Cigarette Smoke  Tobacco smoke can make asthma worse. Do not allow smoking in your home, car or around you.  Be sure no one smokes at a child s day care or school.  If you smoke, ask your health care provider for ways to help you quit.  Ask family members to quit too.  Ask your health care provider for a referral to Quit Plan to help you quit smoking, or call 6-291-410-PLAN.     Colds, Flu, Bronchitis  These are common triggers of asthma. Wash your hands often.  Don t touch your eyes, nose or mouth.  Get a flu shot every year.     Dust Mites  These are tiny bugs that live in cloth or carpet. They are too small to see. Wash sheets and blankets in hot water every week.   Encase pillows and mattress in dust mite proof covers.  Avoid having carpet if you can. If you have carpet, vacuum weekly.   Use a dust mask and HEPA vacuum.   Pollen and Outdoor Mold  Some people are allergic to trees, grass, or weed pollen, or molds. Try to keep your windows  closed.  Limit time out doors when pollen count is high.   Ask you health care provider about taking medicine during allergy season.     Animal Dander  Some people are allergic to skin flakes, urine or saliva from pets with fur or feathers. Keep pets with fur or feathers out of your home.    If you can t keep the pet outdoors, then keep the pet out of your bedroom.  Keep the bedroom door closed.  Keep pets off cloth furniture and away from stuffed toys.     Mice, Rats, and Cockroaches   Some people are allergic to the waste from these pests.   Cover food and garbage.  Clean up spills and food crumbs.  Store grease in the refrigerator.   Keep food out of the bedroom.   Indoor Mold  This can be a trigger if your home has high moisture. Fix leaking faucets, pipes, or other sources of water.   Clean moldy surfaces.  Dehumidify basement if it is damp and smelly.   Smoke, Strong Odors, and Sprays  These can reduce air quality. Stay away from strong odors and sprays, such as perfume, powder, hair spray, paints, smoke incense, paint, cleaning products, candles and new carpet.   Exercise or Sports  Some people with asthma have this trigger. Be active!  Ask your doctor about taking medicine before sports or exercise to prevent symptoms.    Warm up for 5-10 minutes before and after sports or exercise.     Other Triggers of Asthma  Cold air:  Cover your nose and mouth with a scarf.  Sometimes laughing or crying can be a trigger.  Some medicines and food can trigger asthma.

## 2025-04-05 ENCOUNTER — APPOINTMENT (OUTPATIENT)
Dept: CT IMAGING | Facility: CLINIC | Age: 55
End: 2025-04-05
Attending: EMERGENCY MEDICINE
Payer: COMMERCIAL

## 2025-04-05 ENCOUNTER — HOSPITAL ENCOUNTER (EMERGENCY)
Facility: CLINIC | Age: 55
Discharge: HOME | End: 2025-04-05
Attending: EMERGENCY MEDICINE | Admitting: EMERGENCY MEDICINE
Payer: COMMERCIAL

## 2025-04-05 VITALS
OXYGEN SATURATION: 98 % | SYSTOLIC BLOOD PRESSURE: 156 MMHG | RESPIRATION RATE: 14 BRPM | TEMPERATURE: 97.9 F | DIASTOLIC BLOOD PRESSURE: 73 MMHG | HEART RATE: 74 BPM

## 2025-04-05 DIAGNOSIS — R10.9 FLANK PAIN: ICD-10-CM

## 2025-04-05 LAB
ALBUMIN SERPL BCG-MCNC: 4.6 G/DL (ref 3.5–5.2)
ALP SERPL-CCNC: 66 U/L (ref 40–150)
ALT SERPL W P-5'-P-CCNC: 36 U/L (ref 0–70)
ANION GAP SERPL CALCULATED.3IONS-SCNC: 13 MMOL/L (ref 7–15)
AST SERPL W P-5'-P-CCNC: 26 U/L (ref 0–45)
ATRIAL RATE - MUSE: 61 BPM
BASOPHILS # BLD AUTO: 0 10E3/UL (ref 0–0.2)
BASOPHILS NFR BLD AUTO: 0 %
BILIRUB SERPL-MCNC: 0.4 MG/DL
BUN SERPL-MCNC: 15.7 MG/DL (ref 6–20)
CALCIUM SERPL-MCNC: 9.7 MG/DL (ref 8.8–10.4)
CHLORIDE SERPL-SCNC: 102 MMOL/L (ref 98–107)
CREAT SERPL-MCNC: 0.88 MG/DL (ref 0.67–1.17)
DIASTOLIC BLOOD PRESSURE - MUSE: NORMAL MMHG
EGFRCR SERPLBLD CKD-EPI 2021: >90 ML/MIN/1.73M2
EOSINOPHIL # BLD AUTO: 0.9 10E3/UL (ref 0–0.7)
EOSINOPHIL NFR BLD AUTO: 12 %
ERYTHROCYTE [DISTWIDTH] IN BLOOD BY AUTOMATED COUNT: 14.1 % (ref 10–15)
GLUCOSE SERPL-MCNC: 117 MG/DL (ref 70–99)
HCO3 SERPL-SCNC: 23 MMOL/L (ref 22–29)
HCT VFR BLD AUTO: 45.4 % (ref 40–53)
HGB BLD-MCNC: 14.2 G/DL (ref 13.3–17.7)
IMM GRANULOCYTES # BLD: 0.1 10E3/UL
IMM GRANULOCYTES NFR BLD: 1 %
INTERPRETATION ECG - MUSE: NORMAL
LIPASE SERPL-CCNC: 25 U/L (ref 13–60)
LYMPHOCYTES # BLD AUTO: 1.6 10E3/UL (ref 0.8–5.3)
LYMPHOCYTES NFR BLD AUTO: 20 %
MCH RBC QN AUTO: 26.4 PG (ref 26.5–33)
MCHC RBC AUTO-ENTMCNC: 31.3 G/DL (ref 31.5–36.5)
MCV RBC AUTO: 84 FL (ref 78–100)
MONOCYTES # BLD AUTO: 0.8 10E3/UL (ref 0–1.3)
MONOCYTES NFR BLD AUTO: 10 %
NEUTROPHILS # BLD AUTO: 4.5 10E3/UL (ref 1.6–8.3)
NEUTROPHILS NFR BLD AUTO: 57 %
NRBC # BLD AUTO: 0 10E3/UL
NRBC BLD AUTO-RTO: 0 /100
P AXIS - MUSE: 14 DEGREES
PLATELET # BLD AUTO: 277 10E3/UL (ref 150–450)
POTASSIUM SERPL-SCNC: 4.4 MMOL/L (ref 3.4–5.3)
PR INTERVAL - MUSE: 144 MS
PROT SERPL-MCNC: 7.7 G/DL (ref 6.4–8.3)
QRS DURATION - MUSE: 82 MS
QT - MUSE: 382 MS
QTC - MUSE: 384 MS
R AXIS - MUSE: 11 DEGREES
RBC # BLD AUTO: 5.38 10E6/UL (ref 4.4–5.9)
SODIUM SERPL-SCNC: 138 MMOL/L (ref 135–145)
SYSTOLIC BLOOD PRESSURE - MUSE: NORMAL MMHG
T AXIS - MUSE: 27 DEGREES
TROPONIN T SERPL HS-MCNC: <6 NG/L
VENTRICULAR RATE- MUSE: 61 BPM
WBC # BLD AUTO: 7.9 10E3/UL (ref 4–11)

## 2025-04-05 PROCEDURE — 82435 ASSAY OF BLOOD CHLORIDE: CPT | Performed by: EMERGENCY MEDICINE

## 2025-04-05 PROCEDURE — 99285 EMERGENCY DEPT VISIT HI MDM: CPT | Mod: 25 | Performed by: EMERGENCY MEDICINE

## 2025-04-05 PROCEDURE — 99284 EMERGENCY DEPT VISIT MOD MDM: CPT | Performed by: EMERGENCY MEDICINE

## 2025-04-05 PROCEDURE — 84484 ASSAY OF TROPONIN QUANT: CPT | Performed by: EMERGENCY MEDICINE

## 2025-04-05 PROCEDURE — 74177 CT ABD & PELVIS W/CONTRAST: CPT | Mod: 26 | Performed by: STUDENT IN AN ORGANIZED HEALTH CARE EDUCATION/TRAINING PROGRAM

## 2025-04-05 PROCEDURE — 250N000011 HC RX IP 250 OP 636: Performed by: EMERGENCY MEDICINE

## 2025-04-05 PROCEDURE — 36415 COLL VENOUS BLD VENIPUNCTURE: CPT | Performed by: EMERGENCY MEDICINE

## 2025-04-05 PROCEDURE — 71275 CT ANGIOGRAPHY CHEST: CPT | Mod: 26 | Performed by: STUDENT IN AN ORGANIZED HEALTH CARE EDUCATION/TRAINING PROGRAM

## 2025-04-05 PROCEDURE — 93005 ELECTROCARDIOGRAM TRACING: CPT | Performed by: EMERGENCY MEDICINE

## 2025-04-05 PROCEDURE — 85004 AUTOMATED DIFF WBC COUNT: CPT | Performed by: EMERGENCY MEDICINE

## 2025-04-05 PROCEDURE — 80053 COMPREHEN METABOLIC PANEL: CPT | Performed by: EMERGENCY MEDICINE

## 2025-04-05 PROCEDURE — 74177 CT ABD & PELVIS W/CONTRAST: CPT

## 2025-04-05 PROCEDURE — 85014 HEMATOCRIT: CPT | Performed by: EMERGENCY MEDICINE

## 2025-04-05 PROCEDURE — 93010 ELECTROCARDIOGRAM REPORT: CPT | Performed by: EMERGENCY MEDICINE

## 2025-04-05 PROCEDURE — 83690 ASSAY OF LIPASE: CPT | Performed by: EMERGENCY MEDICINE

## 2025-04-05 PROCEDURE — 71275 CT ANGIOGRAPHY CHEST: CPT

## 2025-04-05 RX ORDER — CYCLOBENZAPRINE HCL 10 MG
10 TABLET ORAL 3 TIMES DAILY PRN
Qty: 20 TABLET | Refills: 0 | Status: SHIPPED | OUTPATIENT
Start: 2025-04-05 | End: 2025-04-11

## 2025-04-05 RX ORDER — IOPAMIDOL 755 MG/ML
135 INJECTION, SOLUTION INTRAVASCULAR ONCE
Status: COMPLETED | OUTPATIENT
Start: 2025-04-05 | End: 2025-04-05

## 2025-04-05 RX ADMIN — IOPAMIDOL 135 ML: 755 INJECTION, SOLUTION INTRAVENOUS at 11:46

## 2025-04-05 ASSESSMENT — COLUMBIA-SUICIDE SEVERITY RATING SCALE - C-SSRS
6. HAVE YOU EVER DONE ANYTHING, STARTED TO DO ANYTHING, OR PREPARED TO DO ANYTHING TO END YOUR LIFE?: NO
2. HAVE YOU ACTUALLY HAD ANY THOUGHTS OF KILLING YOURSELF IN THE PAST MONTH?: NO
1. IN THE PAST MONTH, HAVE YOU WISHED YOU WERE DEAD OR WISHED YOU COULD GO TO SLEEP AND NOT WAKE UP?: NO

## 2025-04-05 ASSESSMENT — ACTIVITIES OF DAILY LIVING (ADL)
ADLS_ACUITY_SCORE: 41

## 2025-04-05 NOTE — DISCHARGE INSTRUCTIONS
There is a small nodule on the CT scan.  Please discuss this with your primary care provider to discuss follow-up imaging, none may be needed.  We also discussed physical therapy.  If you develop fevers, difficulty breathing, chest pain or if you have any further concerns return to the emergency department.

## 2025-04-05 NOTE — ED TRIAGE NOTES
PT arrives c/o 3/10 L flank pain that radiates to his back ongoing for 1 week. PT was seen Wednesday in an urgency room in [place] and underwent a CT without any findings.     Denies fevers, N/V/D.     Triage Assessment (Adult)       Row Name 04/05/25 1017          Triage Assessment    Airway WDL WDL        Respiratory WDL    Respiratory WDL WDL        Skin Circulation/Temperature WDL    Skin Circulation/Temperature WDL WDL        Cardiac WDL    Cardiac WDL WDL        Peripheral/Neurovascular WDL    Peripheral Neurovascular WDL WDL        Cognitive/Neuro/Behavioral WDL    Cognitive/Neuro/Behavioral WDL WDL

## 2025-04-05 NOTE — ED PROVIDER NOTES
[place] EMERGENCY DEPARTMENT ([facility])    4/05/25       ED PROVIDER NOTE   History     Chief Complaint   Patient presents with    Abdominal Pain     The history is provided by the patient and medical records.     Dion Thomson is a 54 year old male with a history of hypercholesterolemia, and GERD who presents to the ED for abdominal pain. Patient states he recently was seen at [facility] on 04/02/2025 for LUQ pain where he underwent a CT without any findings.  Pain comes and goes but has not subsided.  Denies any rashes.  Denies any falls or injuries.  He does have some pain when he breathes in but no significant shortness of breath.  No fevers.  No vomiting.  No urinary symptoms or dysuria.  He did have his shingles vaccine about a week and a half ago without any problems.  He is walking without any symptoms or difficulties.  He tried some Tylenol ibuprofen.  He has no ration of pain into his arms.  No weakness of his arms or legs.        Past Medical History  Past Medical History:   Diagnosis Date    Allergic rhinitis     Anxiety     Chronic sinusitis     Chronic sinusitis     Gastroesophageal reflux disease     Hypercholesteremia     Intermittent asthma 12/20/2013    Obstructive sleep apnea     URBANO (obstructive sleep apnea) 12/19/2014     Past Surgical History:   Procedure Laterality Date    ARTHROSCOPIC RECONSTRUCTION ANTERIOR CRUCIATE LIGAMENT  1997    Open, Knee Left    ARTHROSCOPY KNEE RT/LT Left 04/2005    meniscus repair    EXTRACTION(S) DENTAL      OPTICAL TRACKING SYSTEM ENDOSCOPIC SINUS SURGERY Bilateral 9/30/2019    Procedure: Image Guided Bilateral Functional Endoscopic Sinus Surgery, Bilateral turbinate reduction;  Surgeon: Willi Garcia MD;  Location:  OR    OPTICAL TRACKING SYSTEM ENDOSCOPIC SINUS SURGERY Bilateral 12/18/2023    Procedure: STEALTH GUIDED BILATERAL FRONTAL SINUSOTOMY;  Surgeon: Willi Garcia MD;  Location: [facility] OR    TONSILLECTOMY & ADENOIDECTOMY  5 yo    VASECTOMY       Vasectomy     acetaminophen (TYLENOL) 500 MG tablet  albuterol (PROAIR HFA/PROVENTIL HFA/VENTOLIN HFA) 108 (90 Base) MCG/ACT inhaler  albuterol (PROVENTIL) (2.5 MG/3ML) 0.083% neb solution  atorvastatin (LIPITOR) 10 MG tablet  budesonide (PULMICORT) 0.25 MG/2ML neb solution  budesonide (RINOCORT AQUA) 32 MCG/ACT nasal spray  Cholecalciferol (VITAMIN D) 2000 UNITS tablet  fexofenadine (ALLEGRA) 180 MG tablet  fluticasone-salmeterol (AIRDUO RESPICLICK) 113-14 MCG/ACT inhaler  montelukast (SINGULAIR) 10 MG tablet  naproxen sodium 220 MG capsule  omeprazole (PRILOSEC) 20 MG DR capsule  sodium chloride (OCEAN) 0.65 % nasal spray  vitamin B complex with vitamin C (STRESS TAB) tablet      Allergies   Allergen Reactions    Seasonal Allergies      Family History  Family History   Problem Relation Age of Onset    Diabetes Mother     Bipolar Disorder Mother     Anesthesia Reaction Mother         high tolerance    Coronary Artery Disease Father         smoker    Myocardial Infarction Father     Diabetes Maternal Grandmother     Myocardial Infarction Maternal Grandfather     Coronary Artery Disease Early Onset Maternal Grandfather     Unknown/Adopted Paternal Grandmother     Liver Cancer Paternal Grandfather     Gout Brother     Hyperlipidemia Brother     No Known Problems Daughter     No Known Problems Daughter     Diabetes Maternal Aunt     Mental Illness Maternal Aunt     Cancer Other         multiple, both sides    Heart Disease Other      Social History   Social History     Tobacco Use    Smoking status: Never    Smokeless tobacco: Never   Vaping Use    Vaping status: Never Used   Substance Use Topics    Alcohol use: Yes     Alcohol/week: 0.0 - 0.8 standard drinks of alcohol     Comment: Occasional    Drug use: No      ROS: 14 point ROS neg other than the symptoms noted above in the HPI.     A medically appropriate review of systems was performed with pertinent positives and negatives noted in the HPI, and all other  systems negative.    Physical Exam   BP: (!) 156/73  Pulse: 74  Temp: 97.9  F (36.6  C)  Resp: 14  SpO2: 98 %  Physical Exam  Vitals and nursing note reviewed.     Physical Exam   Constitutional: oriented to person, place, and time. appears well-developed and well-nourished.   HENT:   Head: Normocephalic and atraumatic.   Neck: Normal range of motion.   Pulmonary/Chest: Effort normal. No respiratory distress.   Cardiac: No murmurs, rubs, gallops. RRR.  Abdominal: Abdomen soft, nontender, nondistended. No rebound tenderness.  MSK: Long bones without deformity or evidence of trauma  Neurological: alert and oriented to person, place, and time. Stable gait.   strength symmetric.  Sensation grossly tact light touch over all extremities.  Cranial nerves II through XII grossly intact  Skin: Skin is warm and dry.   Psychiatric:  normal mood and affect.  behavior is normal. Thought content normal.        ED Course, Procedures, & Data      Procedures            EKG Interpretation:      Interpreted by Dion Lucas MD  Time reviewed: 1105  Symptoms at time of EKG: chest pain   Rhythm: normal sinus   Rate: normal  Axis: normal  Ectopy: none  Conduction: normal  ST Segments/ T Waves: No ST-T wave changes  Q Waves: none  Comparison to prior: Unchanged    Clinical Impression: normal EKG       Results for orders placed or performed during the hospital encounter of 04/05/25   CT Chest Pulmonary Embolism w Contrast     Status: None    Narrative    CT CHEST PULMONARY EMBOLISM W CONTRAST, 4/5/2025 12:12 PM    History: pleuritic chest pain, L posterior thoracic    Comparison: None.    Technique: Helical acquisition of CT images of the chest from the lung  apices to the kidneys were acquired after the administration of  intravenous contrast according to the CT pulmonary angiogram protocol.  Axial images were reconstructed in 1 and 3 mm slice thickness. Coronal  reconstructions performed. Three-dimensional (3D)  post-processed  angiographic images were reconstructed, archived to PACS and used in  the interpretation of this study.    Contrast dose: 135 m,l isovue 370    Findings:   The contrast bolus is adequate.  There is no pulmonary embolism. 2 mm  solid pulmonary nodule in the peripheral right upper lung (8/93).  No  pleural effusion or pneumothorax.    Mediastinum: The heart is not enlarged. No pericardial effusion. The  ascending aorta is normal in caliber. The main pulmonary artery  measures 2.3 cm in diameter. Few subcentimeter axillary lymph nodes.  No bulky thoracic adenopathy visualized thyroid gland is unremarkable,  given limited evaluation of thoracic inlet secondary to streak related  artifacts.    Upper abdomen: 2.4 cm right upper pole simple renal cyst. No adrenal  nodule. Otherwise, the appearance of the upper abdomen is  unremarkable. 2 mm subpleural nodule in the right lower lobe (6/ 68)    Bones and soft tissues: No acute or aggressive osseous abnormality.  Soft tissues are unremarkable. Subcentimeter sclerotic focus in the  proximal right humerus, favor bone island. Mild degenerative changes  of the spine, especially in the lower cervical spine      Impression    Impression:  1. No pulmonary embolism identified.  2. 2 mm pulmonary nodule. If patient is at high risk for lung cancer  consider follow-up CT chest in 12 months as per Fleischner Society  guidelines.    In the event of a positive result for acute pulmonary embolism  resulting in right heart strain, consider calling the   [facility] patient placement ([phone]) for PERT (Pulmonary Embolism  Response Team) Activation?    PERT -- Pulmonary Embolism Response Team (Multidisciplinary team  including cardiology, interventional radiology, critical care,  hematology)    I have personally reviewed the examination and initial interpretation  and I agree with the findings.    ANDREW DIGGS MD         SYSTEM ID:  [id number]   CT Abdomen Pelvis w Contrast      Status: None    Narrative    EXAMINATION: CT ABDOMEN PELVIS W CONTRAST, 4/5/2025 12:13 PM    INDICATION: ongoing LUQ pain    COMPARISON STUDY: Outside report 4/2/2005.    TECHNIQUE: CT scan of the abdomen and pelvis was performed on  multidetector CT scanner using volumetric acquisition technique and  images were reconstructed in multiple planes with variable thickness  and reviewed on dedicated workstations.     CONTRAST: 135ml isovue 370 injected IV without oral contrast    CT scan radiation dose is optimized to minimum requisite dose using  automated dose modulation techniques.    FINDINGS:    Lower thorax: Please refer to same-day CT chest for thoracic findings    Liver: No mass. No intrahepatic biliary ductal dilation.    Biliary System: Normal gallbladder. No extrahepatic biliary ductal  dilation.    Pancreas: No mass or pancreatic ductal dilation.    Adrenal glands: No mass or nodules    Spleen: Normal.    Kidneys: No calculus or hydronephrosis.  2.6 cm right upper pole  simple appearing cyst.    Gastrointestinal tract : Decompressed the stomach limiting evaluation.  Appendix measures up to 8 mm with no periappendiceal inflammatory  changes Normal caliber small bowel.  Moderate stool burden.    Mesentery/peritoneum/retroperitoneum: No mass. No free fluid or air.    Lymph nodes: No subcentimeter mesenteric and pelvic nodes. No bulky  abdominal adenopathy. Few prominent inguinal lymph nodes    Vasculature: Patent major abdominal vasculature.    Pelvis: Urinary bladder is normal.    Osseous structures: No displaced fracture. Few sclerotic focus in the  pelvis, (4/315), too small to characterize. Degenerative changes of  the spine. Likely limbus vertebra at presumed L1 level.      Soft tissues: A small fat-containing right inguinal hernia. Small  fat-containing umbilical hernia      Impression    IMPRESSION:     1. Simple appearing right renal cyst. No hydronephrosis or  nephrolithiasis.  2. Mild distention of  the appendix measuring up to 8mm with no  periappendiceal inflammatory changes, nonspecific, early  appendix  inflammation is unlikely though cannot be entirely excluded consider  clinical follow-up and short-term imaging, as clinically desired.  3. Few subcentimeter sclerotic focus in the pelvis, too small to  characterize, consider correlation with prior imaging if available  and/or attention on follow-up  4. Mild prostatomegaly  5. Subcentimeter mesenteric nodes and few prominent inguinal lymph  nodes, may consider attention on follow-up.    I have personally reviewed the examination and initial interpretation  and I agree with the findings.    ANDREW DIGGS MD         SYSTEM ID:  [id number]   Comprehensive metabolic panel     Status: Abnormal   Result Value Ref Range    Sodium 138 135 - 145 mmol/L    Potassium 4.4 3.4 - 5.3 mmol/L    Carbon Dioxide (CO2) 23 22 - 29 mmol/L    Anion Gap 13 7 - 15 mmol/L    Urea Nitrogen 15.7 6.0 - 20.0 mg/dL    Creatinine 0.88 0.67 - 1.17 mg/dL    GFR Estimate >90 >60 mL/min/1.73m2    Calcium 9.7 8.8 - 10.4 mg/dL    Chloride 102 98 - 107 mmol/L    Glucose 117 (H) 70 - 99 mg/dL    Alkaline Phosphatase 66 40 - 150 U/L    AST 26 0 - 45 U/L    ALT 36 0 - 70 U/L    Protein Total 7.7 6.4 - 8.3 g/dL    Albumin 4.6 3.5 - 5.2 g/dL    Bilirubin Total 0.4 <=1.2 mg/dL   Lipase     Status: Normal   Result Value Ref Range    Lipase 25 13 - 60 U/L   Troponin T, High Sensitivity     Status: Normal   Result Value Ref Range    Troponin T, High Sensitivity <6 <=22 ng/L   CBC with platelets and differential     Status: Abnormal   Result Value Ref Range    WBC Count 7.9 4.0 - 11.0 10e3/uL    RBC Count 5.38 4.40 - 5.90 10e6/uL    Hemoglobin 14.2 13.3 - 17.7 g/dL    Hematocrit 45.4 40.0 - 53.0 %    MCV 84 78 - 100 fL    MCH 26.4 (L) 26.5 - 33.0 pg    MCHC 31.3 (L) 31.5 - 36.5 g/dL    RDW 14.1 10.0 - 15.0 %    Platelet Count 277 150 - 450 10e3/uL    % Neutrophils 57 %    % Lymphocytes 20 %    %  Monocytes 10 %    % Eosinophils 12 %    % Basophils 0 %    % Immature Granulocytes 1 %    NRBCs per 100 WBC 0 <1 /100    Absolute Neutrophils 4.5 1.6 - 8.3 10e3/uL    Absolute Lymphocytes 1.6 0.8 - 5.3 10e3/uL    Absolute Monocytes 0.8 0.0 - 1.3 10e3/uL    Absolute Eosinophils 0.9 (H) 0.0 - 0.7 10e3/uL    Absolute Basophils 0.0 0.0 - 0.2 10e3/uL    Absolute Immature Granulocytes 0.1 <=0.4 10e3/uL    Absolute NRBCs 0.0 10e3/uL   EKG 12-lead, tracing only     Status: None   Result Value Ref Range    Systolic Blood Pressure  mmHg    Diastolic Blood Pressure  mmHg    Ventricular Rate 61 BPM    Atrial Rate 61 BPM    PA Interval 144 ms    QRS Duration 82 ms     ms    QTc 384 ms    P Axis 14 degrees    R AXIS 11 degrees    T Axis 27 degrees    Interpretation ECG       Sinus rhythm  Normal ECG    Unconfirmed report - interpretation of this ECG is computer generated - see medical record for final interpretation  Confirmed by - EMERGENCY ROOM, PHYSICIAN ([id number]),  Humaira Joe ([id number]) on 4/5/2025 2:36:26 PM     CBC with platelets differential     Status: Abnormal    Narrative    The following orders were created for panel order CBC with platelets differential.  Procedure                               Abnormality         Status                     ---------                               -----------         ------                     CBC with platelets and ...[[id number]]  Abnormal            Final result                 Please view results for these tests on the individual orders.     Medications - No data to display  Labs Ordered and Resulted from Time of ED Arrival to Time of ED Departure - No data to display  No orders to display          Critical care was not performed.     Medical Decision Making  The patient's presentation was of moderate complexity (an undiagnosed new problem with uncertain prognosis).    The patient's evaluation involved:  review of external note(s) from 1 sources (emergency department  note from 2 days ago)  review of 3+ test result(s) ordered prior to this encounter (see separate area of note for details)  ordering and/or review of 3+ test(s) in this encounter (see separate area of note for details)    The patient's management necessitated moderate risk (IV contrast administration).    Assessment & Plan    Patient is in with ongoing flank pain of unclear etiology.  No rash to suggest zoster.  No PE on scan.  Nonspecific findings which discussed with patient, recommend follow-up with primary care provider regarding lung nodule.  No rib fractures.  No other significant findings.  Has no right right lower quadrant but does have a mildly dilated.  Appendix, no surrounding inflammation or white blood count elevation, very low suspicion for appendicitis.  Cardiac workup unremarkable.  Patient otherwise appears well.  Flexeril given for discharge.  Discussed following up with primary care provider.  Discussed may be related to herniated disc, neuroexam is normal here.    I have reviewed the nursing notes. I have reviewed the findings, diagnosis, plan and need for follow up with the patient.    New Prescriptions    No medications on file       Final diagnoses:   Flank pain   I, Armen Higginbotham, am serving as a trained medical scribe to document services personally performed by Dion Lucas MD, based on the provider's statements to me.     IDion MD, was physically present and have reviewed and verified the accuracy of this note documented by Armen Higginbotham.     Dion Lucas MD  [facility] EMERGENCY DEPARTMENT  4/5/2025     Dion Lucas MD  04/05/25 [id number]

## 2025-05-19 DIAGNOSIS — J45.20 MILD INTERMITTENT ASTHMA WITHOUT COMPLICATION: ICD-10-CM

## 2025-05-19 RX ORDER — FLUTICASONE PROPIONATE AND SALMETEROL 113; 14 UG/1; UG/1
1 POWDER, METERED RESPIRATORY (INHALATION) 2 TIMES DAILY
Qty: 1 EACH | Refills: 11 | Status: SHIPPED | OUTPATIENT
Start: 2025-05-19

## (undated) DEVICE — TUBING IRRIGATOR STRAIGHTSHOT XPS 1895522

## (undated) DEVICE — SOL BENZOIN 0.5OZ

## (undated) DEVICE — SOL NACL 0.9% IRRIG 1000ML BOTTLE 2F7124

## (undated) DEVICE — LINEN TOWEL PACK X5 5464

## (undated) DEVICE — ESU GROUND PAD ADULT W/CORD E7507

## (undated) DEVICE — GLOVE PROTEXIS POWDER FREE SMT 8.0  2D72PT80X

## (undated) DEVICE — SOL WATER IRRIG 500ML BOTTLE 2F7113

## (undated) DEVICE — SYR 03ML LL W/O NDL 309657

## (undated) DEVICE — GLOVE PROTEXIS POWDER FREE SMT 7.5  2D72PT75X

## (undated) DEVICE — SUCTION MANIFOLD NEPTUNE 2 SYS 1 PORT 702-025-000

## (undated) DEVICE — BLADE KNIFE SURG 15 371115

## (undated) DEVICE — SYR 03ML LL W/O NDL

## (undated) DEVICE — RX SURGIFLO HEMOSTATIC MATRIX W/THROMBIN 8ML 2994

## (undated) DEVICE — TRACKER ENT OTS INSTRUMENT FUSION 9733533

## (undated) DEVICE — SYR 10ML LL W/O NDL

## (undated) DEVICE — SU CHROMIC 4-0 J-1 18" 724G

## (undated) DEVICE — PACK ENT ENDOSCOPY CUSTOM ASC

## (undated) DEVICE — SUCTION MANIFOLD NEPTUNE 2 SYS 4 PORT 0702-020-000

## (undated) DEVICE — SPONGE COTTONOID 2X1/2" 80-1406

## (undated) DEVICE — NDL 25GA 2"  8881200441

## (undated) DEVICE — SOL WATER IRRIG 1000ML BOTTLE 2F7114

## (undated) DEVICE — SOL NACL 0.9% IRRIG 500ML BOTTLE 2F7123

## (undated) DEVICE — SPONGE COTTONOID 1/2X3" 80-1407

## (undated) DEVICE — DRSG STERI STRIP 1/2X4" R1547

## (undated) DEVICE — ANTIFOG SOLUTION W/FOAM PAD 31142527

## (undated) DEVICE — TRACKER PATIENT NON-INVASIVE AXIEM 9734887XOM

## (undated) DEVICE — SU PLAIN 4-0 SC-1 18" 1824H

## (undated) DEVICE — BLADE QUADCUT ROTATABLE FUSION 3.4MMX13CM M4 1883480EM

## (undated) DEVICE — BLADE QUADCUT ROTATABLE FUSION 4.3MMX13CM M4 1884380EM

## (undated) RX ORDER — OXYMETAZOLINE HYDROCHLORIDE 0.05 G/100ML
SPRAY NASAL
Status: DISPENSED
Start: 2019-09-30

## (undated) RX ORDER — ACETAMINOPHEN 325 MG/1
TABLET ORAL
Status: DISPENSED
Start: 2019-09-30

## (undated) RX ORDER — GABAPENTIN 300 MG/1
CAPSULE ORAL
Status: DISPENSED
Start: 2019-09-30

## (undated) RX ORDER — FENTANYL CITRATE 50 UG/ML
INJECTION, SOLUTION INTRAMUSCULAR; INTRAVENOUS
Status: DISPENSED
Start: 2019-09-30

## (undated) RX ORDER — PROPOFOL 10 MG/ML
INJECTION, EMULSION INTRAVENOUS
Status: DISPENSED
Start: 2023-12-18

## (undated) RX ORDER — FENTANYL CITRATE 50 UG/ML
INJECTION, SOLUTION INTRAMUSCULAR; INTRAVENOUS
Status: DISPENSED
Start: 2023-12-18

## (undated) RX ORDER — PROPOFOL 10 MG/ML
INJECTION, EMULSION INTRAVENOUS
Status: DISPENSED
Start: 2019-09-30

## (undated) RX ORDER — GABAPENTIN 300 MG/1
CAPSULE ORAL
Status: DISPENSED
Start: 2023-12-18

## (undated) RX ORDER — ACETAMINOPHEN 325 MG/1
TABLET ORAL
Status: DISPENSED
Start: 2023-12-18

## (undated) RX ORDER — HYDROMORPHONE HYDROCHLORIDE 1 MG/ML
INJECTION, SOLUTION INTRAMUSCULAR; INTRAVENOUS; SUBCUTANEOUS
Status: DISPENSED
Start: 2019-09-30

## (undated) RX ORDER — ONDANSETRON 4 MG/1
TABLET, ORALLY DISINTEGRATING ORAL
Status: DISPENSED
Start: 2019-09-30

## (undated) RX ORDER — OXYCODONE HYDROCHLORIDE 10 MG/1
TABLET ORAL
Status: DISPENSED
Start: 2019-09-30

## (undated) RX ORDER — LIDOCAINE HYDROCHLORIDE AND EPINEPHRINE 10; 10 MG/ML; UG/ML
INJECTION, SOLUTION INFILTRATION; PERINEURAL
Status: DISPENSED
Start: 2023-12-18

## (undated) RX ORDER — OXYCODONE HYDROCHLORIDE 5 MG/1
TABLET ORAL
Status: DISPENSED
Start: 2023-12-18